# Patient Record
Sex: FEMALE | Race: BLACK OR AFRICAN AMERICAN | Employment: FULL TIME | URBAN - METROPOLITAN AREA
[De-identification: names, ages, dates, MRNs, and addresses within clinical notes are randomized per-mention and may not be internally consistent; named-entity substitution may affect disease eponyms.]

---

## 2017-03-10 ENCOUNTER — OFFICE VISIT (OUTPATIENT)
Dept: FAMILY MEDICINE CLINIC | Age: 32
End: 2017-03-10

## 2017-03-10 VITALS
WEIGHT: 228.2 LBS | SYSTOLIC BLOOD PRESSURE: 124 MMHG | TEMPERATURE: 97.5 F | OXYGEN SATURATION: 98 % | HEART RATE: 98 BPM | DIASTOLIC BLOOD PRESSURE: 87 MMHG | RESPIRATION RATE: 16 BRPM | BODY MASS INDEX: 40.43 KG/M2 | HEIGHT: 63 IN

## 2017-03-10 DIAGNOSIS — Z83.3 FAMILY HISTORY OF DIABETES MELLITUS: Primary | ICD-10-CM

## 2017-03-10 DIAGNOSIS — K52.9 CHRONIC DIARRHEA OF UNKNOWN ORIGIN: ICD-10-CM

## 2017-03-10 DIAGNOSIS — Z79.899 ENCOUNTER FOR LONG-TERM (CURRENT) USE OF MEDICATIONS: ICD-10-CM

## 2017-03-10 DIAGNOSIS — K21.9 GASTROESOPHAGEAL REFLUX DISEASE WITHOUT ESOPHAGITIS: ICD-10-CM

## 2017-03-10 DIAGNOSIS — F17.200 SMOKER: ICD-10-CM

## 2017-03-10 DIAGNOSIS — K90.49 DAIRY PRODUCT INTOLERANCE: ICD-10-CM

## 2017-03-10 DIAGNOSIS — M54.31 SCIATICA, RIGHT SIDE: ICD-10-CM

## 2017-03-10 LAB
HCG URINE, QL. (POC): NEGATIVE
VALID INTERNAL CONTROL?: YES

## 2017-03-10 RX ORDER — OMEPRAZOLE 20 MG/1
20 CAPSULE, DELAYED RELEASE ORAL DAILY
Qty: 30 CAP | Refills: 2 | Status: SHIPPED | OUTPATIENT
Start: 2017-03-10 | End: 2022-09-09

## 2017-03-10 NOTE — PATIENT INSTRUCTIONS
Gastroesophageal Reflux Disease (GERD): Care Instructions  Your Care Instructions    Gastroesophageal reflux disease (GERD) is the backward flow of stomach acid into the esophagus. The esophagus is the tube that leads from your throat to your stomach. A one-way valve prevents the stomach acid from moving up into this tube. When you have GERD, this valve does not close tightly enough. If you have mild GERD symptoms including heartburn, you may be able to control the problem with antacids or over-the-counter medicine. Changing your diet, losing weight, and making other lifestyle changes can also help reduce symptoms. Follow-up care is a key part of your treatment and safety. Be sure to make and go to all appointments, and call your doctor if you are having problems. Its also a good idea to know your test results and keep a list of the medicines you take. How can you care for yourself at home? · Take your medicines exactly as prescribed. Call your doctor if you think you are having a problem with your medicine. · Your doctor may recommend over-the-counter medicine. For mild or occasional indigestion, antacids, such as Tums, Gaviscon, Mylanta, or Maalox, may help. Your doctor also may recommend over-the-counter acid reducers, such as Pepcid AC, Tagamet HB, Zantac 75, or Prilosec. Read and follow all instructions on the label. If you use these medicines often, talk with your doctor. · Change your eating habits. ¨ Its best to eat several small meals instead of two or three large meals. ¨ After you eat, wait 2 to 3 hours before you lie down. ¨ Chocolate, mint, and alcohol can make GERD worse. ¨ Spicy foods, foods that have a lot of acid (like tomatoes and oranges), and coffee can make GERD symptoms worse in some people. If your symptoms are worse after you eat a certain food, you may want to stop eating that food to see if your symptoms get better.   · Do not smoke or chew tobacco. Smoking can make GERD worse. If you need help quitting, talk to your doctor about stop-smoking programs and medicines. These can increase your chances of quitting for good. · If you have GERD symptoms at night, raise the head of your bed 6 to 8 inches by putting the frame on blocks or placing a foam wedge under the head of your mattress. (Adding extra pillows does not work.)  · Do not wear tight clothing around your middle. · Lose weight if you need to. Losing just 5 to 10 pounds can help. When should you call for help? Call your doctor now or seek immediate medical care if:  · You have new or different belly pain. · Your stools are black and tarlike or have streaks of blood. Watch closely for changes in your health, and be sure to contact your doctor if:  · Your symptoms have not improved after 2 days. · Food seems to catch in your throat or chest.  Where can you learn more? Go to http://joesphAkeneoleia.info/. Enter W854 in the search box to learn more about \"Gastroesophageal Reflux Disease (GERD): Care Instructions. \"  Current as of: August 9, 2016  Content Version: 11.1  © 9059-6154 [employer]. Care instructions adapted under license by [employer] (which disclaims liability or warranty for this information). If you have questions about a medical condition or this instruction, always ask your healthcare professional. [address] any warranty or liability for your use of this information. Lactose Intolerance: Care Instructions  Your Care Instructions  Lactose is sugar that is found in milk and milk products. Some people do not make enough of an enzyme called lactase, which digests lactose. When this happens it can cause gas, belly pain, diarrhea, and bloating. This is called lactose intolerance. This is not the same as food allergy to milk. Lactose intolerance affects different people in different ways. Some people cannot digest any milk products.  Other people can eat or drink small amounts of milk products or certain types of milk products without problems. You can learn how to avoid discomfort and still get enough calcium to maintain healthy bones. Follow-up care is a key part of your treatment and safety. Be sure to make and go to all appointments, and call your doctor if you are having problems. It's also a good idea to know your test results and keep a list of the medicines you take. How can you care for yourself at home? · Limit the amount of milk and milk products in your diet. Try to drink 1 glass of milk each day. Drink small amounts several times a day. All types of milk contain the same amount of lactose. If you are not sure whether a milk product causes symptoms, try a small amount and wait to see how you feel before you eat or drink more. · Eat or drink milk and milk products along with other foods. For some people, combining a solid food (like cereal) with a dairy product (like milk) can reduce symptoms. · Eat small amounts of milk products throughout the day instead of larger amounts all at once. · Eat or drink milk and milk products that have reduced lactose. In most grocery stores, you can buy milk with reduced lactose, such as Lactaid milk. · Eat or drink other foods instead of milk and milk products. Try soy milk and soy cheese, and use nondairy creamers in your coffee. Keep in mind that nondairy creamers may contain more fat than milk. · Use lactase products. These are dietary supplements that help you digest lactose. Some are pills that you chew (such as Lactaid) before you eat or drink milk products. Others are liquids that you add to milk 24 hours before you drink it. Try a few products and brands to see which ones work best for you. · Some people who are lactose-intolerant can eat some kinds of yogurt without problems, especially yogurt with live cultures.  It's best to try a small amount of different brands of yogurt to see which ones work best for you. · Watch out for lactose in foods you buy. Some prepared foods contain lactose, including breads and baked goods, breakfast cereals, instant potatoes and soups, margarine, salad dressings, and many snacks. Be sure to read labels for lactose and for lactose's \"hidden\" names. These include dry milk solids, whey, curds, milk by-products, and nonfat dry milk powder. · Be sure to get enough calcium in your diet, especially if you avoid milk products completely. To get enough calcium, you would need to eat calcium-rich foods as often as someone would drink milk. Calcium is very important because it keeps bones strong and reduces the risk of osteoporosis. Ask your dietitian for advice on how to get enough calcium. Foods that have calcium include:  ¨ Broccoli, spinach, kale, and jovanna, mustard, and turnip greens. ¨ Canned sardines and other small fish that have bones you can eat. ¨ Calcium-fortified orange juice. ¨ Soy products such as fortified soy milk and tofu. ¨ Almonds. ¨ Dried beans. · If you are worried about getting enough nutrients, ask your doctor about taking supplements, such as calcium and vitamin D. When should you call for help? Call your doctor now or seek immediate medical care if:  · You have severe belly pain. · You pass maroon or very bloody stools. · Your stools are black and tarlike or have streaks of blood. · You have trouble swallowing. · You are losing weight and do not know why. Watch closely for changes in your health, and be sure to contact your doctor if:  · Your symptoms get worse. · You do not get better as expected. Where can you learn more? Go to http://joesph-leia.info/. Enter H201 in the search box to learn more about \"Lactose Intolerance: Care Instructions. \"  Current as of: February 12, 2016  Content Version: 11.1  © 1205-0565 [employer].  Care instructions adapted under license by [employer] (which disclaims liability or warranty for this information). If you have questions about a medical condition or this instruction, always ask your healthcare professional. [address] any warranty or liability for your use of this information. Celiac Disease: Care Instructions  Your Care Instructions  Celiac disease (or celiac sprue) is a problem with digesting gluten. Gluten is a type of protein found in wheat, rye, and other grains. This problem starts when the body's immune system attacks the small intestine when gluten is eaten. The immune system is supposed to fight off viruses and other invaders, but sometimes it turns on the person's own body. (This is called an autoimmune disease.) Celiac disease seems to run in families. Celiac disease causes damage to the small intestine. This makes it hard for the body to absorb vitamins and other nutrients. You cannot prevent celiac disease. But you can stop and reverse the damage to the small intestine by eating a strict gluten-free diet. Follow-up care is a key part of your treatment and safety. Be sure to make and go to all appointments, and call your doctor if you are having problems. It's also a good idea to know your test results and keep a list of the medicines you take. How can you care for yourself at home? · Eat a gluten-free diet to prevent symptoms and damage to the small intestine. Even a small amount of gluten may cause damage. ¨ Avoid all foods that contain wheat, rye, and barley gluten. Bread, bagels, pasta, pizza, malted breakfast cereals, and crackers are all examples of foods that contain gluten. ¨ Avoid oats, at least at first. Oats cause symptoms in some people. After your symptoms stop, you may be able to add oats to your diet. · You may need to avoid milk and milk products for a while. Once you stop eating any gluten, the intestine will begin to heal. Then it should be okay to drink milk and eat milk products.   · Read food labels carefully and look for hidden gluten, such as gluten in medicine and some food additives. If a label says \"modified food starch,\" the product may contain gluten. · Plan your diet around:  ¨ Eggs. ¨ Dairy products, if you can eat them. Cheese, yogurt, and other dairy products can be an important part of the diet. ¨ Flours and foods made with amaranth, arrowroot, beans, buckwheat, corn, cornmeal, flax, millet, potatoes, pure uncontaminated nut and oat bran, quinoa, rice, sorghum, soybeans, tapioca, or teff. ¨ Fresh, frozen, and canned meats, fruits, and vegetables. Watch for added gluten. · Talk to your doctor or contact your local hospital or dietitian for information about support groups in your area. You may find a support group helpful for discovering ways to help you deal with celiac disease. Celiac disease support groups often share recipes and good food sources. · Look for gluten-free foods. Many food stores, especially health food stores, offer specially marked gluten-free food. When should you call for help? Watch closely for changes in your health, and be sure to contact your doctor if:  · Your bloating, gas, and diarrhea get worse. · You have bloating, gas, and diarrhea after not having them for a while. Where can you learn more? Go to http://joesph-leia.info/. Enter [id number] in the search box to learn more about \"Celiac Disease: Care Instructions. \"  Current as of: August 9, 2016  Content Version: 11.1  © 8822-2210 [employer]. Care instructions adapted under license by [employer] (which disclaims liability or warranty for this information). If you have questions about a medical condition or this instruction, always ask your healthcare professional. [address] any warranty or liability for your use of this information.        Gluten-Free Diet: Care Instructions  Your Care Instructions  To help your symptoms, your doctor has recommended a gluten-free diet. This means not eating foods that have gluten in them. Gluten is a kind of protein. It's found in wheat, barley, and rye. If you eat a gluten-free diet, you can help manage your symptoms and prevent long-term problems. You can also get all the nutrition you need. Follow-up care is a key part of your treatment and safety. Be sure to make and go to all appointments, and call your doctor if you are having problems. It's also a good idea to know your test results and keep a list of the medicines you take. How can you care for yourself at home? · Don't eat any foods that have gluten in them. These include bagels, bread, crackers, and some cereals. They also include pasta and pizza. · Carefully read food labels. Look for wheat or wheat products in ice cream and candy. You may also find them in salad dressing, canned and frozen soups and vegetables, and other processed foods. · Avoid all beer products unless the label says they are gluten-free. Beers with and without alcohol have gluten unless the labels say they are gluten-free. This includes lagers, ales, and stouts. · When you eat out, look for restaurants that serve gluten-free food. You can also ask if the  is familiar with gluten-free cooking. · Try to learn more about gluten-free options. Find grocery stores that sell gluten-free pizza and other foods. If you have access to the Internet, look online for gluten-free foods and recipes. · On a gluten-free eating plan, it's okay to have:  ¨ Eggs and dairy products. (But some dairy products may make your symptoms worse. Ask your doctor if you have questions about dairy products. Read ingredient labels carefully. Some processed cheeses contain gluten.)  ¨ Flours and foods made with amaranth, arrowroot, beans, buckwheat, corn, cornmeal, flax, millet, potatoes, pure uncontaminated nut and oat bran, quinoa, rice, sorghum, soybeans, tapioca, or teff.   ¨ Fresh, frozen, or canned unprocessed meats. But avoid processed meats. Some examples of processed meats to avoid are hot dogs, salami, and deli meat. Read labels for additives that may contain gluten. ¨ Fresh, frozen, dried, or canned fruits and vegetables, if they do not have thickeners or other additives that contain gluten. ¨ Some alcohol drinks. These include wine, liqueurs, and ciders. They also include liquor like whiskey and valentine. When should you call for help? Watch closely for changes in your health, and be sure to contact your doctor if:  · You have unexplained weight loss. · You have diarrhea that lasts longer than 1 to 2 weeks. · You have unusual fatigue or mood changes, especially if these last more than a week and are not related to any other illness, such as the flu. · Your symptoms come back again. · Your stomach pain gets worse. Where can you learn more? Go to http://joesph-leia.info/. Enter [id number] in the search box to learn more about \"Gluten-Free Diet: Care Instructions. \"  Current as of: July 26, 2016  Content Version: 11.1  © 2086-4260 [employer]. Care instructions adapted under license by [employer] (which disclaims liability or warranty for this information). If you have questions about a medical condition or this instruction, always ask your healthcare professional. [place] [address] any warranty or liability for your use of this information.

## 2017-03-10 NOTE — PROGRESS NOTES
Tish Schneider is a 32 y.o. female    3rd visit with this physician. Saw once in 2015 for URI, again once in 2016 Feb for URI. GI: Whenever eat Bread; cold, chill, vomiting, stomach pain, diarrhea. No blood in stool or constipation. No fever. Currently no issue. Sometimes dairy will do the same thing. Bloating gassy, diarrhea. Stomach constantly in knots. Also have acid reflux. Taking zantac and pepto bismal PRN. Chronic low back pain with sciatica. No new symptoms of LE weakness, loss of bowel or bladder, or saddle anesthesia. Review of Systems  A comprehensive review of systems was negative except for that written in the HPI. Current Outpatient Prescriptions on File Prior to Visit   Medication Sig Dispense Refill    naproxen (NAPROSYN) 500 mg tablet Take 1 Tab by mouth every twelve (12) hours as needed for Pain. 20 Tab 0    loratadine (CLARITIN) 10 mg tablet Take 1 Tab by mouth daily. 30 Tab 2     No current facility-administered medications on file prior to visit. Allergies   Allergen Reactions    Chantix [Varenicline] Nausea Only       Past Medical History:   Diagnosis Date    Asthma     No problems as adult or teenager--last problems as child    Chronic diarrhea of unknown origin 3/10/2017    Dairy product intolerance 3/10/2017    Family history of diabetes mellitus 3/10/2017    Gastroesophageal reflux disease without esophagitis 3/10/2017    History of seasonal allergies     Allegra or Claritin--equally effective.     Sciatica, right side 3/10/2017    Smoker 2/23/2016    Smoker 2/23/2016       Family History   Problem Relation Age of Onset    Diabetes Mother     No Known Problems Father     Hypertension Maternal Aunt     Heart Disease Maternal Grandmother      CAD    Diabetes Maternal Grandmother     Hypertension Maternal Grandmother     No Known Problems Sister        Social History     Social History    Marital status: SINGLE     Spouse name: N/A    Number of children: 0    Years of education: college     Occupational History    [employer]      Social History Main Topics    Smoking status: Current Every Day Smoker     Packs/day: 0.50     Years: 20.00     Types: Cigarettes    Smokeless tobacco: Never Used    Alcohol use Yes      Comment: 3 pints of burbon weekly    Drug use: No    Sexual activity: Yes     Partners: Male     Birth control/ protection: None     Other Topics Concern    Not on file     Social History Narrative    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 PERRL, EOM's intact  Lungs: clear to auscultation bilaterally  Heart: regular rate and rhythm, S1, S2 normal, no murmur, click, rub or gallop  Extremities: extremities normal, atraumatic, no cyanosis or edema  Abdo: soft, slight tender on deep palpation. No guarding or rebound. Bowel sounds normal. No masses,  no organomegaly    Urine pregnancy negative. Assessments and Plans    Antonina was seen today for irritable bowel syndrome. Diagnoses and all orders for this visit:    Family history of diabetes mellitus    Smoker    Gastroesophageal reflux disease without esophagitis  -     omeprazole (PRILOSEC) 20 mg capsule; Take 1 Cap by mouth daily.     Sciatica, right side  -     REFERRAL TO PHYSICAL THERAPY    Dairy product intolerance  -     LACTOSE TOLERANCE TEST    Chronic diarrhea of unknown origin  - CBC W/O DIFF  -     METABOLIC PANEL, COMPREHENSIVE  -     TSH RFX ON ABNORMAL TO FREE T4  -     CELIAC ANTIBODY PROFILE    Encounter for long-term (current) use of medications  -     AMB POC URINE PREGNANCY TEST, VISUAL COLOR COMPARISON      Follow-up Disposition:  Return in about 2 weeks (around 3/24/2017) for annual exam, labs.       Larry Ross MD  3/10/2017

## 2017-03-10 NOTE — MR AVS SNAPSHOT
Visit Information Date & Time Provider Department Dept. Phone Encounter #  
 3/10/2017 11:45 AM Cammy Raymundo MD [facility] at [address] [id number] Follow-up Instructions Return in about 2 weeks (around 3/24/2017) for annual exam, labs. Upcoming Health Maintenance Date Due Pneumococcal 19-64 Medium Risk (1 of 1 - PPSV23) 7/14/2004 DTaP/Tdap/Td series (1 - Tdap) 7/14/2006 PAP AKA CERVICAL CYTOLOGY 7/14/2006 INFLUENZA AGE 9 TO ADULT 8/1/2016 Allergies as of 3/10/2017  Review Complete On: 3/10/2017 By: Cammy Raymundo MD  
  
 Severity Noted Reaction Type Reactions Chantix [Varenicline]  10/10/2011    Nausea Only Current Immunizations  Reviewed on 6/12/2014 Name Date Hepatitis B Vaccine 7/5/2003 PPD 7/5/2003 Not reviewed this visit You Were Diagnosed With   
  
 Codes Comments Family history of diabetes mellitus    -  Primary ICD-10-CM: Z83.3 ICD-9-CM: V18.0 Smoker     ICD-10-CM: G53.367 ICD-9-CM: 305.1 Gastroesophageal reflux disease without esophagitis     ICD-10-CM: K21.9 ICD-9-CM: 530.81 Sciatica, right side     ICD-10-CM: M54.31 
ICD-9-CM: 724.3 Dairy product intolerance     ICD-10-CM: K90.9 ICD-9-CM: 579.8 Chronic diarrhea of unknown origin     ICD-10-CM: K52.9 ICD-9-CM: 787.91 Encounter for long-term (current) use of medications     ICD-10-CM: Z79.899 ICD-9-CM: V58.69 Vitals BP Pulse Temp Resp Height(growth percentile) Weight(growth percentile) 124/87 98 97.5 °F (36.4 °C) (Oral) 16 5' 3\" (1.6 m) 228 lb 3.2 oz (103.5 kg) LMP SpO2 BMI OB Status Smoking Status 02/07/2017 98% 40.42 kg/m2 Having regular periods Current Every Day Smoker Vitals History BMI and BSA Data Body Mass Index Body Surface Area 40.42 kg/m 2 2.14 m 2 Preferred Pharmacy Pharmacy Name Phone  [address] [phone] Your Updated Medication List  
  
   
This list is accurate as of: 3/10/17 12:25 PM.  Always use your most recent med list.  
  
  
  
  
 loratadine 10 mg tablet Commonly known as:  Yari Dustman Take 1 Tab by mouth daily. naproxen 500 mg tablet Commonly known as:  NAPROSYN Take 1 Tab by mouth every twelve (12) hours as needed for Pain. omeprazole 20 mg capsule Commonly known as:  PRILOSEC Take 1 Cap by mouth daily. Prescriptions Sent to Pharmacy Refills  
 omeprazole (PRILOSEC) 20 mg capsule 2 Sig: Take 1 Cap by mouth daily. Class: Normal  
 Pharmacy: [facility] [employer] [facility] [address] [place] [address] RD AT [address] [facility] Ph #: [phone] Route: Oral  
  
We Performed the Following AMB POC URINE PREGNANCY TEST, VISUAL COLOR COMPARISON [15211 CPT(R)] CBC W/O DIFF [39245 CPT(R)] CELIAC ANTIBODY PROFILE [[id number] Custom] LACTOSE TOLERANCE TEST [[id number] Custom] METABOLIC PANEL, COMPREHENSIVE [58560 CPT(R)] REFERRAL TO PHYSICAL THERAPY [CNZ51 Custom] Comments:  
 Please evaluate patient for chronic back pain with sciatica. TSH RFX ON ABNORMAL TO FREE T4 [[id number] Custom] Follow-up Instructions Return in about 2 weeks (around 3/24/2017) for annual exam, labs. Referral Information Referral ID Referred By Referred To  
  
 [id number] Dalila Ma Not Available Visits Status Start Date End Date 1 New Request 3/10/17 3/10/18 If your referral has a status of pending review or denied, additional information will be sent to support the outcome of this decision. Patient Instructions Gastroesophageal Reflux Disease (GERD): Care Instructions Your Care Instructions Gastroesophageal reflux disease (GERD) is the backward flow of stomach acid into the esophagus.  The esophagus is the tube that leads from your throat to your stomach. A one-way valve prevents the stomach acid from moving up into this tube. When you have GERD, this valve does not close tightly enough. If you have mild GERD symptoms including heartburn, you may be able to control the problem with antacids or over-the-counter medicine. Changing your diet, losing weight, and making other lifestyle changes can also help reduce symptoms. Follow-up care is a key part of your treatment and safety. Be sure to make and go to all appointments, and call your doctor if you are having problems. Its also a good idea to know your test results and keep a list of the medicines you take. How can you care for yourself at home? · Take your medicines exactly as prescribed. Call your doctor if you think you are having a problem with your medicine. · Your doctor may recommend over-the-counter medicine. For mild or occasional indigestion, antacids, such as Tums, Gaviscon, Mylanta, or Maalox, may help. Your doctor also may recommend over-the-counter acid reducers, such as Pepcid AC, Tagamet HB, Zantac 75, or Prilosec. Read and follow all instructions on the label. If you use these medicines often, talk with your doctor. · Change your eating habits. ¨ Its best to eat several small meals instead of two or three large meals. ¨ After you eat, wait 2 to 3 hours before you lie down. ¨ Chocolate, mint, and alcohol can make GERD worse. ¨ Spicy foods, foods that have a lot of acid (like tomatoes and oranges), and coffee can make GERD symptoms worse in some people. If your symptoms are worse after you eat a certain food, you may want to stop eating that food to see if your symptoms get better. · Do not smoke or chew tobacco. Smoking can make GERD worse. If you need help quitting, talk to your doctor about stop-smoking programs and medicines. These can increase your chances of quitting for good.  
· If you have GERD symptoms at night, raise the head of your bed 6 to 8 inches by putting the frame on blocks or placing a foam wedge under the head of your mattress. (Adding extra pillows does not work.) · Do not wear tight clothing around your middle. · Lose weight if you need to. Losing just 5 to 10 pounds can help. When should you call for help? Call your doctor now or seek immediate medical care if: 
· You have new or different belly pain. · Your stools are black and tarlike or have streaks of blood. Watch closely for changes in your health, and be sure to contact your doctor if: 
· Your symptoms have not improved after 2 days. · Food seems to catch in your throat or chest. 
Where can you learn more? Go to http://joesph-leia.info/. Enter Q035 in the search box to learn more about \"Gastroesophageal Reflux Disease (GERD): Care Instructions. \" Current as of: August 9, 2016 Content Version: 11.1 © 2365-9580 Urbita. Care instructions adapted under license by [employer]ito (which disclaims liability or warranty for this information). If you have questions about a medical condition or this instruction, always ask your healthcare professional. [place] [address] any warranty or liability for your use of this information. Lactose Intolerance: Care Instructions Your Care Instructions Lactose is sugar that is found in milk and milk products. Some people do not make enough of an enzyme called lactase, which digests lactose. When this happens it can cause gas, belly pain, diarrhea, and bloating. This is called lactose intolerance. This is not the same as food allergy to milk. Lactose intolerance affects different people in different ways. Some people cannot digest any milk products. Other people can eat or drink small amounts of milk products or certain types of milk products without problems. You can learn how to avoid discomfort and still get enough calcium to maintain healthy bones. Follow-up care is a key part of your treatment and safety. Be sure to make and go to all appointments, and call your doctor if you are having problems. It's also a good idea to know your test results and keep a list of the medicines you take. How can you care for yourself at home? · Limit the amount of milk and milk products in your diet. Try to drink 1 glass of milk each day. Drink small amounts several times a day. All types of milk contain the same amount of lactose. If you are not sure whether a milk product causes symptoms, try a small amount and wait to see how you feel before you eat or drink more. · Eat or drink milk and milk products along with other foods. For some people, combining a solid food (like cereal) with a dairy product (like milk) can reduce symptoms. · Eat small amounts of milk products throughout the day instead of larger amounts all at once. · Eat or drink milk and milk products that have reduced lactose. In most grocery stores, you can buy milk with reduced lactose, such as Lactaid milk. · Eat or drink other foods instead of milk and milk products. Try soy milk and soy cheese, and use nondairy creamers in your coffee. Keep in mind that nondairy creamers may contain more fat than milk. · Use lactase products. These are dietary supplements that help you digest lactose. Some are pills that you chew (such as Lactaid) before you eat or drink milk products. Others are liquids that you add to milk 24 hours before you drink it. Try a few products and brands to see which ones work best for you. · Some people who are lactose-intolerant can eat some kinds of yogurt without problems, especially yogurt with live cultures. It's best to try a small amount of different brands of yogurt to see which ones work best for you. · Watch out for lactose in foods you buy.  Some prepared foods contain lactose, including breads and baked goods, breakfast cereals, instant potatoes and soups, margarine, salad dressings, and many snacks. Be sure to read labels for lactose and for lactose's \"hidden\" names. These include dry milk solids, whey, curds, milk by-products, and nonfat dry milk powder. · Be sure to get enough calcium in your diet, especially if you avoid milk products completely. To get enough calcium, you would need to eat calcium-rich foods as often as someone would drink milk. Calcium is very important because it keeps bones strong and reduces the risk of osteoporosis. Ask your dietitian for advice on how to get enough calcium. Foods that have calcium include: ¨ Broccoli, spinach, kale, and jovanna, mustard, and turnip greens. ¨ Canned sardines and other small fish that have bones you can eat. ¨ Calcium-fortified orange juice. ¨ Soy products such as fortified soy milk and tofu. ¨ Almonds. ¨ Dried beans. · If you are worried about getting enough nutrients, ask your doctor about taking supplements, such as calcium and vitamin D. When should you call for help? Call your doctor now or seek immediate medical care if: 
· You have severe belly pain. · You pass maroon or very bloody stools. · Your stools are black and tarlike or have streaks of blood. · You have trouble swallowing. · You are losing weight and do not know why. Watch closely for changes in your health, and be sure to contact your doctor if: 
· Your symptoms get worse. · You do not get better as expected. Where can you learn more? Go to http://joesph-leia.info/. Enter H201 in the search box to learn more about \"Lactose Intolerance: Care Instructions. \" Current as of: February 12, 2016 Content Version: 11.1 © 3680-4682 magnify360. Care instructions adapted under license by Caring.[employer] (which disclaims liability or warranty for this information).  If you have questions about a medical condition or this instruction, always ask your healthcare professional. [address] any warranty or liability for your use of this information. Celiac Disease: Care Instructions Your Care Instructions Celiac disease (or celiac sprue) is a problem with digesting gluten. Gluten is a type of protein found in wheat, rye, and other grains. This problem starts when the body's immune system attacks the small intestine when gluten is eaten. The immune system is supposed to fight off viruses and other invaders, but sometimes it turns on the person's own body. (This is called an autoimmune disease.) Celiac disease seems to run in families. Celiac disease causes damage to the small intestine. This makes it hard for the body to absorb vitamins and other nutrients. You cannot prevent celiac disease. But you can stop and reverse the damage to the small intestine by eating a strict gluten-free diet. Follow-up care is a key part of your treatment and safety. Be sure to make and go to all appointments, and call your doctor if you are having problems. It's also a good idea to know your test results and keep a list of the medicines you take. How can you care for yourself at home? · Eat a gluten-free diet to prevent symptoms and damage to the small intestine. Even a small amount of gluten may cause damage. ¨ Avoid all foods that contain wheat, rye, and barley gluten. Bread, bagels, pasta, pizza, malted breakfast cereals, and crackers are all examples of foods that contain gluten. ¨ Avoid oats, at least at first. Oats cause symptoms in some people. After your symptoms stop, you may be able to add oats to your diet. · You may need to avoid milk and milk products for a while. Once you stop eating any gluten, the intestine will begin to heal. Then it should be okay to drink milk and eat milk products.  
· Read food labels carefully and look for hidden gluten, such as gluten in medicine and some food additives. If a label says \"modified food starch,\" the product may contain gluten. · Plan your diet around: 
¨ Eggs. ¨ Dairy products, if you can eat them. Cheese, yogurt, and other dairy products can be an important part of the diet. ¨ Flours and foods made with amaranth, arrowroot, beans, buckwheat, corn, cornmeal, flax, millet, potatoes, pure uncontaminated nut and oat bran, quinoa, rice, sorghum, soybeans, tapioca, or teff. ¨ Fresh, frozen, and canned meats, fruits, and vegetables. Watch for added gluten. · Talk to your doctor or contact your local hospital or dietitian for information about support groups in your area. You may find a support group helpful for discovering ways to help you deal with celiac disease. Celiac disease support groups often share recipes and good food sources. · Look for gluten-free foods. Many food stores, especially health food stores, offer specially marked gluten-free food. When should you call for help? Watch closely for changes in your health, and be sure to contact your doctor if: 
· Your bloating, gas, and diarrhea get worse. · You have bloating, gas, and diarrhea after not having them for a while. Where can you learn more? Go to http://joesph-leia.info/. Enter [id number] in the search box to learn more about \"Celiac Disease: Care Instructions. \" Current as of: August 9, 2016 Content Version: 11.1 © 20063200-9347 Kickfire. Care instructions adapted under license by [employer]er [employer] (which disclaims liability or warranty for this information). If you have questions about a medical condition or this instruction, always ask your healthcare professional. [place] [address] any warranty or liability for your use of this information. Gluten-Free Diet: Care Instructions Your Care Instructions To help your symptoms, your doctor has recommended a gluten-free diet. This means not eating foods that have gluten in them. Gluten is a kind of protein. It's found in wheat, barley, and rye. If you eat a gluten-free diet, you can help manage your symptoms and prevent long-term problems. You can also get all the nutrition you need. Follow-up care is a key part of your treatment and safety. Be sure to make and go to all appointments, and call your doctor if you are having problems. It's also a good idea to know your test results and keep a list of the medicines you take. How can you care for yourself at home? · Don't eat any foods that have gluten in them. These include bagels, bread, crackers, and some cereals. They also include pasta and pizza. · Carefully read food labels. Look for wheat or wheat products in ice cream and candy. You may also find them in salad dressing, canned and frozen soups and vegetables, and other processed foods. · Avoid all beer products unless the label says they are gluten-free. Beers with and without alcohol have gluten unless the labels say they are gluten-free. This includes lagers, ales, and stouts. · When you eat out, look for restaurants that serve gluten-free food. You can also ask if the  is familiar with gluten-free cooking. · Try to learn more about gluten-free options. Find grocery stores that sell gluten-free pizza and other foods. If you have access to the Internet, look online for gluten-free foods and recipes. · On a gluten-free eating plan, it's okay to have: 
¨ Eggs and dairy products. (But some dairy products may make your symptoms worse. Ask your doctor if you have questions about dairy products. Read ingredient labels carefully. Some processed cheeses contain gluten.) ¨ Flours and foods made with amaranth, arrowroot, beans, buckwheat, corn, cornmeal, flax, millet, potatoes, pure uncontaminated nut and oat bran, quinoa, rice, sorghum, soybeans, tapioca, or teff. ¨ Fresh, frozen, or canned unprocessed meats. But avoid processed meats. Some examples of processed meats to avoid are hot dogs, salami, and deli meat. Read labels for additives that may contain gluten. ¨ Fresh, frozen, dried, or canned fruits and vegetables, if they do not have thickeners or other additives that contain gluten. ¨ Some alcohol drinks. These include wine, liqueurs, and ciders. They also include liquor like whiskey and valentine. When should you call for help? Watch closely for changes in your health, and be sure to contact your doctor if: 
· You have unexplained weight loss. · You have diarrhea that lasts longer than 1 to 2 weeks. · You have unusual fatigue or mood changes, especially if these last more than a week and are not related to any other illness, such as the flu. · Your symptoms come back again. · Your stomach pain gets worse. Where can you learn more? Go to http://joesph-leia.info/. Enter 31 [id number] 19 in the search box to learn more about \"Gluten-Free Diet: Care Instructions. \" Current as of: July 26, 2016 Content Version: 11.1 © 2427-6309 Ditto. Care instructions adapted under license by Immure Records (which disclaims liability or warranty for this information). If you have questions about a medical condition or this instruction, always ask your healthcare professional. [address] any warranty or liability for your use of this information. Introducing [facility]! Dear Kerry Shultz: Thank you for requesting a Factor.io account. Our records indicate that you already have an active Factor.io account. You can access your account anytime at [email or web address] Did you know that you can access your hospital and ER discharge instructions at any time in Factor.[email or web address]? You can also review all of your test results from your hospital stay or ER visit. Additional Information If you have questions, please visit the Frequently Asked Questions section of the Tiinkkhart website at [email or web address] Remember, OBOOK is NOT to be used for urgent needs. For medical emergencies, dial 911. Now available from your iPhone and Android! Please provide this summary of care documentation to your next provider. Your primary care clinician is listed as Altagracia Rojas. If you have any questions after today's visit, please call [phone].

## 2017-03-10 NOTE — PROGRESS NOTES
Chief Complaint   Patient presents with    Irritable Bowel Syndrome      heartburn, N/V diahrrea, constipation,

## 2017-05-27 ENCOUNTER — HOSPITAL ENCOUNTER (EMERGENCY)
Age: 32
Discharge: HOME | End: 2017-05-27
Attending: EMERGENCY MEDICINE
Payer: COMMERCIAL

## 2017-05-27 ENCOUNTER — APPOINTMENT (OUTPATIENT)
Dept: GENERAL RADIOLOGY | Age: 32
End: 2017-05-27
Attending: PHYSICIAN ASSISTANT
Payer: COMMERCIAL

## 2017-05-27 VITALS
HEIGHT: 64 IN | TEMPERATURE: 99.2 F | OXYGEN SATURATION: 100 % | WEIGHT: 230.6 LBS | RESPIRATION RATE: 18 BRPM | DIASTOLIC BLOOD PRESSURE: 104 MMHG | BODY MASS INDEX: 39.37 KG/M2 | SYSTOLIC BLOOD PRESSURE: 159 MMHG | HEART RATE: 88 BPM

## 2017-05-27 DIAGNOSIS — F17.210 CIGARETTE NICOTINE DEPENDENCE WITHOUT COMPLICATION: ICD-10-CM

## 2017-05-27 DIAGNOSIS — J06.9 ACUTE UPPER RESPIRATORY INFECTION: Primary | ICD-10-CM

## 2017-05-27 LAB — HCG UR QL: NEGATIVE

## 2017-05-27 PROCEDURE — 71020 XR CHEST PA LAT: CPT

## 2017-05-27 PROCEDURE — 81025 URINE PREGNANCY TEST: CPT

## 2017-05-27 PROCEDURE — 74011250637 HC RX REV CODE- 250/637: Performed by: PHYSICIAN ASSISTANT

## 2017-05-27 PROCEDURE — 99283 EMERGENCY DEPT VISIT LOW MDM: CPT

## 2017-05-27 RX ORDER — NAPROXEN 500 MG/1
500 TABLET ORAL
Qty: 20 TAB | Refills: 0 | Status: SHIPPED | OUTPATIENT
Start: 2017-05-27 | End: 2019-01-30

## 2017-05-27 RX ORDER — HYDROCODONE BITARTRATE AND ACETAMINOPHEN 7.5; 325 MG/15ML; MG/15ML
5 SOLUTION ORAL
Status: COMPLETED | OUTPATIENT
Start: 2017-05-27 | End: 2017-05-27

## 2017-05-27 RX ORDER — IBUPROFEN 200 MG
1 TABLET ORAL EVERY 24 HOURS
Qty: 30 PATCH | Refills: 0 | Status: SHIPPED | OUTPATIENT
Start: 2017-05-27 | End: 2017-06-26

## 2017-05-27 RX ORDER — HYDROCODONE BITARTRATE AND HOMATROPINE METHYLBROMIDE 1.5; 5 MG/5ML; MG/5ML
5 SYRUP ORAL 4 TIMES DAILY
Qty: 100 ML | Refills: 0 | Status: SHIPPED | OUTPATIENT
Start: 2017-05-27 | End: 2019-01-30

## 2017-05-27 RX ORDER — BENZONATATE 100 MG/1
200 CAPSULE ORAL
Qty: 30 CAP | Refills: 0 | Status: SHIPPED | OUTPATIENT
Start: 2017-05-27 | End: 2017-06-03

## 2017-05-27 RX ADMIN — HYDROCODONE BITARTRATE AND ACETAMINOPHEN 5 MG: 2.5; 108 SOLUTION ORAL at 17:18

## 2017-05-27 NOTE — LETTER
[address] 
[facility] [place] [facility] EM[facility] 
[address] [place] [address] [address] [address] [phone] Work/School Note Date: 5/27/2017 To Whom It May concern: 
 
Roberto Johnston was seen and treated today in the emergency room by the following provider(s): 
Attending Provider: Zion Fischer MD 
Physician Assistant: BLAKE Rockwell. Antonina Floyd may return to work on 5/30/17 or sooner, if feeling better. Sincerely, BLAKE Rockwell

## 2017-05-27 NOTE — ED PROVIDER NOTES
HPI Comments: Viki Saini is a 32 y.o. female with PMHx significant for Asthma, who presents ambulatory to [facility] ED with cc of a dry cough for one week. Pt also complains of an associated subjective slight fever. She states that she has taken Nyquil, Vicks, and cough drops with no alleviation in symptoms. She notes that she had asthma as a child, but has not had it recently. She also notes that she is just coming off her LMP. Pt denies having any recent sick contacts or hx of kidney, liver, or thyroid disease. She also denies any ear pain or sore throat. RFA:DILEEP Staley MD    Social Hx: + smoking; - EtOH; - illicit drug use    There are no other complains, changes, or physical findings at this time. The history is provided by the patient. No  was used. Past Medical History:   Diagnosis Date    Asthma     No problems as adult or teenager--last problems as child    Chronic diarrhea of unknown origin 3/10/2017    Dairy product intolerance 3/10/2017    Family history of diabetes mellitus 3/10/2017    Gastroesophageal reflux disease without esophagitis 3/10/2017    History of seasonal allergies     Allegra or Claritin--equally effective.     Sciatica, right side 3/10/2017    Smoker 2/23/2016    Smoker 2/23/2016       Past Surgical History:   Procedure Laterality Date    PAP SMEAR  10/11         Family History:   Problem Relation Age of Onset    Diabetes Mother     No Known Problems Father     Hypertension Maternal Aunt     Heart Disease Maternal Grandmother      CAD    Diabetes Maternal Grandmother     Hypertension Maternal Grandmother     No Known Problems Sister        Social History     Social History    Marital status: SINGLE     Spouse name: N/A    Number of children: 0    Years of education: college     Occupational History    [employer]      Social History Main Topics    Smoking status: Current Every Day Smoker     Packs/day: 0.50     Years: 20.00 Types: Cigarettes    Smokeless tobacco: Never Used    Alcohol use Yes      Comment: 3 pints of burbon weekly    Drug use: No    Sexual activity: Yes     Partners: Male     Birth control/ protection: None     Other Topics Concern    Not on file     Social History Narrative    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 HENT: Negative. Negative for ear pain and sore throat. Eyes: Negative. Respiratory: Positive for cough (dry). Cardiovascular: Negative. Gastrointestinal: Negative. Negative for constipation, diarrhea, nausea and vomiting. Denies liver disease   Endocrine:        Denies thyroid disease   Genitourinary: Negative. Negative for dysuria. Denies kidney disease   Musculoskeletal: Negative. Skin: Negative. Neurological: Negative. All other systems reviewed and are negative. Patient Vitals for the past 12 hrs:   Temp Pulse Resp BP SpO2   05/27/17 1600 99.2 °F (37.3 °C) 88 18 (!) 159/104 100 %       Physical Exam   Constitutional: She is oriented to person, place, and time. She appears well-developed and well-nourished. No distress. HENT:   Head: Normocephalic and atraumatic. Right Ear: External ear normal.   Left Ear: External ear normal.   Nose: Nose normal.   Mouth/Throat: Oropharynx is clear and moist. No oropharyngeal exudate. Eyes: Conjunctivae and EOM are normal. Pupils are equal, round, and reactive to light. Right eye exhibits no discharge. Left eye exhibits no discharge. No scleral icterus. Neck: Normal range of motion. Neck supple. No tracheal deviation present. Cardiovascular: Normal rate, regular rhythm, normal heart sounds and intact distal pulses. Exam reveals no gallop and no friction rub. No murmur heard. Pulmonary/Chest: Effort normal and breath sounds normal. No respiratory distress. She has no wheezes. She has no rales. She exhibits no tenderness. Hacking dry cough. Clear to auscultation bilaterally. Musculoskeletal: She exhibits no edema or tenderness. Lymphadenopathy:     She has no cervical adenopathy. Neurological: She is alert and oriented to person, place, and time. No cranial nerve deficit. Skin: Skin is warm and dry. No rash noted. No erythema. Psychiatric: She has a normal mood and affect. Her behavior is normal.   Nursing note and vitals reviewed. MDM  Number of Diagnoses or Management Options  Acute upper respiratory infection:   Cigarette nicotine dependence without complication:   Diagnosis management comments: DDx: PNA, URI, nicotine dependence       Amount and/or Complexity of Data Reviewed  Clinical lab tests: ordered and reviewed  Tests in the radiology section of CPT®: ordered and reviewed  Review and summarize past medical records: yes    Patient Progress  Patient progress: stable      Procedures    4:54 PM  Discussed the risks of smoking and the benefits of smoking cessation as well as the long term sequelae of smoking with the pt. The patient verbalized their understanding. LABORATORY TESTS:  Recent Results (from the past 12 hour(s))   HCG URINE, QL. - POC    Collection Time: 05/27/17  4:44 PM   Result Value Ref Range    Pregnancy test,urine (POC) NEGATIVE  NEG         IMAGING RESULTS:    CXR Results  (Last 48 hours)               05/27/17 1712  XR CHEST PA LAT Final result    Impression:  IMPRESSION: No acute abnormality identified                       Narrative:  EXAM:  XR CHEST PA LAT       INDICATION:   cough       COMPARISON: None. FINDINGS: PA and lateral radiographs of the chest demonstrate clear lungs. The   cardiac and mediastinal contours and pulmonary vascularity are normal.  The   bones and soft tissues are within normal limits. MEDICATIONS GIVEN:  Medications   HYDROcodone-acetaminophen (HYCET) 0.5-21.7 mg/mL oral solution 5 mg (5 mg Oral Given 5/27/17 5638)       IMPRESSION:  1. Acute upper respiratory infection    2. Cigarette nicotine dependence without complication        PLAN:  1. Discharge Medication List as of 5/27/2017  5:55 PM      START taking these medications    Details   nicotine (NICODERM CQ) 14 mg/24 hr patch 1 Patch by TransDERmal route every twenty-four (24) hours for 30 days. , Normal, Disp-30 Patch, R-0      HYDROcodone-homatropine (HYCODAN) 5-1.5 mg/5 mL (5 mL) syrup Take 5 mL by mouth four (4) times daily. Max Daily Amount: 20 mL., Print, Disp-100 mL, R-0      benzonatate (TESSALON PERLES) 100 mg capsule Take 2 Caps by mouth three (3) times daily as needed for Cough for up to 7 days. , Normal, Disp-30 Cap, R-0         CONTINUE these medications which have CHANGED    Details   naproxen (NAPROSYN) 500 mg tablet Take 1 Tab by mouth every twelve (12) hours as needed for Pain., Normal, Disp-20 Tab, R-0         CONTINUE these medications which have NOT CHANGED    Details   omeprazole (PRILOSEC) 20 mg capsule Take 1 Cap by mouth daily. , Normal, Disp-30 Cap, R-2      loratadine (CLARITIN) 10 mg tablet Take 1 Tab by mouth daily. , Normal, Disp-30 Tab, R-2           2. Follow-up Information     Follow up With Details Comments Contact Info    Marylen Kirsten, MD   [address] [place] [address]  [phone]      [facility] EMERGENCY DEPT  If symptoms worsen [address] [place] [address]  [phone]        Return to ED if worse     DISCHARGE NOTE  5:56 PM  The patient has been re-evaluated and is ready for discharge. Reviewed available results with patient. Counseled patient on diagnosis and care plan. Patient has expressed understanding, and all questions have been answered. Patient agrees with plan and agrees to follow up as recommended, or return to the ED if their symptoms worsen. Discharge instructions have been provided and explained to the patient, along with reasons to return to the ED.     ATTESTATION:  This note is prepared by Radha Katz, acting as Scribe for Sharona Hatch MD.    Sharona Hatch MD: The scribe's documentation has been prepared under my direction and personally reviewed by me in its entirety. I confirm that the note above accurately reflects all work, treatment, procedures, and medical decision making performed by me.

## 2017-05-27 NOTE — ED NOTES
Discharge instructions reviewed with pt. Discharge instructions given to pt per PA. Pt able to return/verbalize discharge instructions. Copy of discharge instructions given. RX given to pt. Pt condition stable, respiratory status within normal limits, neuro status intact. Pt ambulatory out of ER, accompanied by .

## 2017-05-27 NOTE — DISCHARGE INSTRUCTIONS
Saline Nasal Washes: Care Instructions  Your Care Instructions  Saline nasal washes help keep the nasal passages open by washing out thick or dried mucus. This simple remedy can help relieve symptoms of allergies, sinusitis, and colds. It also can make the nose feel more comfortable by keeping the mucous membranes moist. You may notice a little burning sensation in your nose the first few times you use the solution, but this usually gets better in a few days. Follow-up care is a key part of your treatment and safety. Be sure to make and go to all appointments, and call your doctor if you are having problems. It's also a good idea to know your test results and keep a list of the medicines you take. How can you care for yourself at home? · You can buy premixed saline solution in a squeeze bottle or other sinus rinse products at a drugstore. Read and follow the instructions on the label. · You also can make your own saline solution by adding 1 teaspoon of salt and 1 teaspoon of baking soda to 2 cups of distilled water. · If you use a homemade solution, pour a small amount into a clean bowl. Using a rubber bulb syringe, squeeze the syringe and place the tip in the salt water. Pull a small amount of the salt water into the syringe by relaxing your hand. · Sit down with your head tilted slightly back. Do not lie down. Put the tip of the bulb syringe or the squeeze bottle a little way into one of your nostrils. Gently drip or squirt a few drops into the nostril. Repeat with the other nostril. Some sneezing and gagging are normal at first.  · Gently blow your nose. · Wipe the syringe or bottle tip clean after each use. · Repeat this 2 or 3 times a day. · Use nasal washes gently if you have nosebleeds often. When should you call for help? Watch closely for changes in your health, and be sure to contact your doctor if:  · You often get nosebleeds. · You have problems doing the nasal washes.   Where can you learn more? Go to http://joesph-leia.info/. Enter 071 [id number] [id number] in the search box to learn more about \"Saline Nasal Washes: Care Instructions. \"  Current as of: July 29, 2016  Content Version: 11.2  © 7756-6183 [employer]. Care instructions adapted under license by [employer] (which disclaims liability or warranty for this information). If you have questions about a medical condition or this instruction, always ask your healthcare professional. [address] any warranty or liability for your use of this information. Learning About Benefits From Quitting Smoking  How does quitting smoking make you healthier? If you're thinking about quitting smoking, you may have a few reasons to be smoke-free. Your health may be one of them. · When you quit smoking, you lower your risks for cancer, lung disease, heart attack, stroke, blood vessel disease, and blindness from macular degeneration. · When you're smoke-free, you get sick less often, and you heal faster. You are less likely to get colds, flu, bronchitis, and pneumonia. · As a nonsmoker, you may find that your mood is better and you are less stressed. When and how will you feel healthier? Quitting has real health benefits that start from day 1 of being smoke-free. And the longer you stay smoke-free, the healthier you get and the better you feel. The first hours  · After just 20 minutes, your blood pressure and heart rate go down. That means there's less stress on your heart and blood vessels. · Within 12 hours, the level of carbon monoxide in your blood drops back to normal. That makes room for more oxygen. With more oxygen in your body, you may notice that you have more energy than when you smoked. After 2 weeks  · Your lungs start to work better. · Your risk of heart attack starts to drop.   After 1 month  · When your lungs are clear, you cough less and breathe deeper, so it's easier to be active. · Your sense of taste and smell return. That means you can enjoy food more than you have since you started smoking. Over the years  · After 1 year, your risk of heart disease is half what it would be if you kept smoking. · After 5 years, your risk of stroke starts to shrink. Within a few years after that, it's about the same as if you'd never smoked. · After 10 years, your risk of dying from lung cancer is cut by about half. And your risk for many other types of cancer is lower too. How would quitting help others in your life? When you quit smoking, you improve the health of everyone who now breathes in your smoke. · Their heart, lung, and cancer risks drop, much like yours. · They are sick less. For babies and small children, living smoke-free means they're less likely to have ear infections, pneumonia, and bronchitis. · If you're a woman who is or will be pregnant someday, quitting smoking means a healthier . · Children who are close to you are less likely to become adult smokers. Where can you learn more? Go to http://joesphSecure-NOKleia.info/. Enter [id number] [id number] in the search box to learn more about \"Learning About Benefits From Quitting Smoking. \"  Current as of: May 26, 2016  Content Version: 11.2  © 3193-3644 [employer]. Care instructions adapted under license by [employer] (which disclaims liability or warranty for this information). If you have questions about a medical condition or this instruction, always ask your healthcare professional. [place] [address] any warranty or liability for your use of this information. Stopping Smoking: Care Instructions  Your Care Instructions  Cigarette smokers crave the nicotine in cigarettes. Giving it up is much harder than simply changing a habit. Your body has to stop craving the nicotine. It is hard to quit, but you can do it. There are many tools that people use to quit smoking.  You may find that combining tools works best for you. There are several steps to quitting. First you get ready to quit. Then you get support to help you. After that, you learn new skills and behaviors to become a nonsmoker. For many people, a necessary step is getting and using medicine. Your doctor will help you set up the plan that best meets your needs. You may want to attend a smoking cessation program to help you quit smoking. When you choose a program, look for one that has proven success. Ask your doctor for ideas. You will greatly increase your chances of success if you take medicine as well as get counseling or join a cessation program.  Some of the changes you feel when you first quit tobacco are uncomfortable. Your body will miss the nicotine at first, and you may feel short-tempered and grumpy. You may have trouble sleeping or concentrating. Medicine can help you deal with these symptoms. You may struggle with changing your smoking habits and rituals. The last step is the tricky one: Be prepared for the smoking urge to continue for a time. This is a lot to deal with, but keep at it. You will feel better. Follow-up care is a key part of your treatment and safety. Be sure to make and go to all appointments, and call your doctor if you are having problems. Its also a good idea to know your test results and keep a list of the medicines you take. How can you care for yourself at home? · Ask your family, friends, and coworkers for support. You have a better chance of quitting if you have help and support. · Join a support group, such as Nicotine Anonymous, for people who are trying to quit smoking. · Consider signing up for a smoking cessation program, such as the American Lung Association's Freedom from Smoking program.  · Set a quit date. Pick your date carefully so that it is not right in the middle of a big deadline or stressful time. Once you quit, do not even take a puff.  Get rid of all ashtrays and lighters after your last cigarette. Clean your house and your clothes so that they do not smell of smoke. · Learn how to be a nonsmoker. Think about ways you can avoid those things that make you reach for a cigarette. ¨ Avoid situations that put you at greatest risk for smoking. For some people, it is hard to have a drink with friends without smoking. For others, they might skip a coffee break with coworkers who smoke. ¨ Change your daily routine. Take a different route to work or eat a meal in a different place. · Cut down on stress. Calm yourself or release tension by doing an activity you enjoy, such as reading a book, taking a hot bath, or gardening. · Talk to your doctor or pharmacist about nicotine replacement therapy, which replaces the nicotine in your body. You still get nicotine but you do not use tobacco. Nicotine replacement products help you slowly reduce the amount of nicotine you need. These products come in several forms, many of them available over-the-counter:  ¨ Nicotine patches  ¨ Nicotine gum and lozenges  ¨ Nicotine inhaler  · Ask your doctor about bupropion (Wellbutrin) or varenicline (Chantix), which are prescription medicines. They do not contain nicotine. They help you by reducing withdrawal symptoms, such as stress and anxiety. · Some people find hypnosis, acupuncture, and massage helpful for ending the smoking habit. · Eat a healthy diet and get regular exercise. Having healthy habits will help your body move past its craving for nicotine. · Be prepared to keep trying. Most people are not successful the first few times they try to quit. Do not get mad at yourself if you smoke again. Make a list of things you learned and think about when you want to try again, such as next week, next month, or next year. Where can you learn more? Go to http://joesph-leia.info/. Enter L852 in the search box to learn more about \"Stopping Smoking: Care Instructions. \"  Current as of: May 26, 2016  Content Version: 11.2  © 9830-2123 [employer]. Care instructions adapted under license by [employer] (which disclaims liability or warranty for this information). If you have questions about a medical condition or this instruction, always ask your healthcare professional. [address] any warranty or liability for your use of this information. Upper Respiratory Infection (Cold): Care Instructions  Your Care Instructions    An upper respiratory infection, or URI, is an infection of the nose, sinuses, or throat. URIs are spread by coughs, sneezes, and direct contact. The common cold is the most frequent kind of URI. The flu and sinus infections are other kinds of URIs. Almost all URIs are caused by viruses. Antibiotics won't cure them. But you can treat most infections with home care. This may include drinking lots of fluids and taking over-the-counter pain medicine. You will probably feel better in 4 to 10 days. The doctor has checked you carefully, but problems can develop later. If you notice any problems or new symptoms, get medical treatment right away. Follow-up care is a key part of your treatment and safety. Be sure to make and go to all appointments, and call your doctor if you are having problems. It's also a good idea to know your test results and keep a list of the medicines you take. How can you care for yourself at home? · To prevent dehydration, drink plenty of fluids, enough so that your urine is light yellow or clear like water. Choose water and other caffeine-free clear liquids until you feel better. If you have kidney, heart, or liver disease and have to limit fluids, talk with your doctor before you increase the amount of fluids you drink. · Take an over-the-counter pain medicine, such as acetaminophen (Tylenol), ibuprofen (Advil, Motrin), or naproxen (Aleve). Read and follow all instructions on the label.   · Before you use cough and cold medicines, check the label. These medicines may not be safe for young children or for people with certain health problems. · Be careful when taking over-the-counter cold or flu medicines and Tylenol at the same time. Many of these medicines have acetaminophen, which is Tylenol. Read the labels to make sure that you are not taking more than the recommended dose. Too much acetaminophen (Tylenol) can be harmful. · Get plenty of rest.  · Do not smoke or allow others to smoke around you. If you need help quitting, talk to your doctor about stop-smoking programs and medicines. These can increase your chances of quitting for good. When should you call for help? Call 911 anytime you think you may need emergency care. For example, call if:  · You have severe trouble breathing. Call your doctor now or seek immediate medical care if:  · You seem to be getting much sicker. · You have new or worse trouble breathing. · You have a new or higher fever. · You have a new rash. Watch closely for changes in your health, and be sure to contact your doctor if:  · You have a new symptom, such as a sore throat, an earache, or sinus pain. · You cough more deeply or more often, especially if you notice more mucus or a change in the color of your mucus. · You do not get better as expected. Where can you learn more? Go to http://joesph-leia.info/. Enter U866 in the search box to learn more about \"Upper Respiratory Infection (Cold): Care Instructions. \"  Current as of: June 30, 2016  Content Version: 11.2  © 3308-2346 [employer]. Care instructions adapted under license by [employer] (which disclaims liability or warranty for this information). If you have questions about a medical condition or this instruction, always ask your healthcare professional. [address] any warranty or liability for your use of this information.

## 2019-01-30 ENCOUNTER — OFFICE VISIT (OUTPATIENT)
Dept: FAMILY MEDICINE CLINIC | Age: 34
End: 2019-01-30

## 2019-01-30 ENCOUNTER — TELEPHONE (OUTPATIENT)
Dept: FAMILY MEDICINE CLINIC | Age: 34
End: 2019-01-30

## 2019-01-30 VITALS
WEIGHT: 224.8 LBS | OXYGEN SATURATION: 97 % | SYSTOLIC BLOOD PRESSURE: 161 MMHG | TEMPERATURE: 97.9 F | HEART RATE: 107 BPM | DIASTOLIC BLOOD PRESSURE: 122 MMHG | BODY MASS INDEX: 38.38 KG/M2 | HEIGHT: 64 IN | RESPIRATION RATE: 16 BRPM

## 2019-01-30 DIAGNOSIS — F17.200 SMOKER: ICD-10-CM

## 2019-01-30 DIAGNOSIS — I10 ESSENTIAL HYPERTENSION: ICD-10-CM

## 2019-01-30 DIAGNOSIS — L20.82 FLEXURAL ECZEMA: ICD-10-CM

## 2019-01-30 DIAGNOSIS — F41.8 DEPRESSION WITH ANXIETY: Primary | ICD-10-CM

## 2019-01-30 RX ORDER — BUPROPION HYDROCHLORIDE 150 MG/1
150 TABLET, EXTENDED RELEASE ORAL
COMMUNITY
Start: 2018-10-01 | End: 2019-01-30 | Stop reason: SDDI

## 2019-01-30 RX ORDER — RANITIDINE 150 MG/1
600 TABLET, FILM COATED ORAL 2 TIMES DAILY
COMMUNITY
End: 2020-01-20 | Stop reason: ALTCHOICE

## 2019-01-30 RX ORDER — LISINOPRIL 20 MG/1
20 TABLET ORAL DAILY
Qty: 30 TAB | Refills: 2 | Status: SHIPPED | OUTPATIENT
Start: 2019-01-30 | End: 2019-03-13

## 2019-01-30 RX ORDER — NORETHINDRONE ACETATE AND ETHINYL ESTRADIOL 1MG-20(21)
KIT ORAL
COMMUNITY
Start: 2018-09-27 | End: 2020-05-22 | Stop reason: ALTCHOICE

## 2019-01-30 RX ORDER — TRIAMCINOLONE ACETONIDE 1 MG/G
OINTMENT TOPICAL 2 TIMES DAILY
Qty: 30 G | Refills: 0 | Status: SHIPPED | OUTPATIENT
Start: 2019-01-30 | End: 2020-01-06 | Stop reason: ALTCHOICE

## 2019-01-30 RX ORDER — BUSPIRONE HYDROCHLORIDE 15 MG/1
15 TABLET ORAL
Qty: 30 TAB | Refills: 1 | Status: SHIPPED | OUTPATIENT
Start: 2019-01-30 | End: 2019-02-27

## 2019-01-30 RX ORDER — SERTRALINE HYDROCHLORIDE 25 MG/1
25 TABLET, FILM COATED ORAL DAILY
Qty: 30 TAB | Refills: 1 | Status: SHIPPED | OUTPATIENT
Start: 2019-01-30 | End: 2019-02-27

## 2019-01-30 NOTE — TELEPHONE ENCOUNTER
----- Message from [address] sent at 1/30/2019  3:39 PM EST -----  Regarding: Dr. Jennifer Luna  Pt returned call.  Best contact number is [phone]

## 2019-01-30 NOTE — PROGRESS NOTES
Chief Complaint Patient presents with  Blood Pressure Check  Panic Attack Had HTN during pregancy. Having panic attacks, not taking Wellbutrin, doesn't like the way it makes her feel. When took BP for 2nd time she became tearfull. BP was 161/122 at this time. 1. Have you been to the ER, urgent care clinic since your last visit? Hospitalized since your last visit? yes 2. Have you seen or consulted any other health care providers outside of the [address] [place] [address] since your last visit? Include any pap smears or colon screening. Therapist 
 
Declined Flu Vac

## 2019-01-30 NOTE — PROGRESS NOTES
Cathy Watts is a 35 y.o. female who presents for Patient's last menstrual period was 01/20/2019 (exact date). I last saw this patient 03/2017. Since then she have seen other PCP 3 times. has a past medical history of Asthma, Chronic diarrhea of unknown origin (3/10/2017), Dairy product intolerance (3/10/2017), Depression with anxiety (1/30/2019), Essential hypertension (1/30/2019), Family history of diabetes mellitus (3/10/2017), Gastroesophageal reflux disease without esophagitis (3/10/2017), History of seasonal allergies, Hypertension, Psychotic disorder ([facility] [address] Sciatica, right side (3/10/2017), Smoker (2/23/2016), and Smoker (2/23/2016). Delivered baby 07/2018. Not breast feeding. Depression, anxiety Since then was told having postpartum depression. Report hx of anxiety and depression in the past was treated with Wellbutrin, but never this bad. Mother had depression. Is seeing Therapist Dr. Luis Parra. Denies SI or HI. Not sleeping well Anxiety, panic attacks. Single, unemployed, staying with mother. Discussed treatment, options, expectation We'll start zoloft. HTN: BP high today. Hx of HTN. Was on nifedipine during pregnancy. Hand eczema Smoker: was put on wellbutrin SR, \"doesn't like the way it makes me feel. \" Allergies Allergen Reactions  Chantix [Varenicline] Nausea Only Current Outpatient Medications on File Prior to Visit Medication Sig Dispense Refill  norethindrone-ethinyl estradiol (JUNEL FE 1/20, 28,) 1 mg-20 mcg (21)/75 mg (7) tab TK 1 T PO QD    
 raNITIdine (ZANTAC) 150 mg tablet Take 600 mg by mouth two (2) times a day.  omeprazole (PRILOSEC) 20 mg capsule Take 1 Cap by mouth daily. 30 Cap 2 No current facility-administered medications on file prior to visit. Past Medical History:  
Diagnosis Date  Asthma No problems as adult or teenager--last problems as child  Chronic diarrhea of unknown origin 3/10/2017  Dairy product intolerance 3/10/2017  Depression with anxiety 1/30/2019  Essential hypertension 1/30/2019  Family history of diabetes mellitus 3/10/2017  Gastroesophageal reflux disease without esophagitis 3/10/2017  History of seasonal allergies Allegra or Claritin--equally effective.  Hypertension  Psychotic disorder ([facility] [address]  Sciatica, right side 3/10/2017  Smoker 2/23/2016  Smoker 2/23/2016 Past Surgical History:  
Procedure Laterality Date  PAP SMEAR  10/11 Family History Problem Relation Age of Onset  Diabetes Mother  No Known Problems Father  Hypertension Maternal Aunt  Heart Disease Maternal Grandmother CAD  Diabetes Maternal Grandmother  Hypertension Maternal Grandmother  No Known Problems Sister Social History Tobacco Use  Smoking status: Current Every Day Smoker Packs/day: 0.50 Years: 20.00 Pack years: 10.00 Types: Cigarettes  Smokeless tobacco: Never Used Substance Use Topics  Alcohol use: Yes Comment: 3 pints of burbon weekly  Drug use: No  
 
 
Review of Systems A comprehensive review of systems was negative except for that written in the HPI. Objective:  
 
Visit Vitals BP (!) 161/122 Pulse (!) 107 Temp 97.9 °F (36.6 °C) (Oral) Resp 16 Ht 5' 4\" (1.626 m) Wt 224 lb 12.8 oz (102 kg) LMP 01/20/2019 (Exact Date) SpO2 97% BMI 38.59 kg/m² General:  alert, cooperative, no distress, appears stated age Head:  NCAT w/o lesions or tenderness Lungs: clear to auscultation bilaterally Heart:  regular rate and rhythm, S1, S2 normal, no murmur, click, rub or gallop Neuro: normal without focal findings 
mental status, speech normal, alert and oriented x iii LOUANN 
fundi are normal 
cranial nerves 2-12 intact Affect & Behavior:  good grooming, full facial expressions, normal speech pattern and content, normal thought patterns, normal perception, good insight, normal reasoning. Was tearful during exam.   
 
Hand eczema palm surface bilat. Assessment/ Plan Diagnoses and all orders for this visit: 1. Depression with anxiety 
-     sertraline (ZOLOFT) 25 mg tablet; Take 1 Tab by mouth daily. -     busPIRone (BUSPAR) 15 mg tablet; Take 1 Tab by mouth two (2) times daily as needed. 2. Essential hypertension 
-     lisinopril (PRINIVIL, ZESTRIL) 20 mg tablet; Take 1 Tab by mouth daily. 3. Smoker 4. Flexural eczema 
-     triamcinolone acetonide (KENALOG) 0.1 % ointment; Apply  to affected area two (2) times a day. use thin layer Follow-up Disposition: 
Return in about 4 weeks (around 2/27/2019) for HTN, depression, anxiety, sooner if not better. · Patient Education:  Reviewed concept of anxiety as biochemical imbalance of neurotransmitters and rationale for treatment. Instructed patient to contact office or on-call physician promptly should condition worsen or any new symptoms appear and provided on-call telephone numbers. IF THE PATIENT HAS ANY SUICIDAL OR HOMICIDAL IDEATION, CALL THE OFFICE, DISCUSS WITH A SUPPORT MEMBER OR GO TO THE ER IMMEDIATELY. Patient was agreeable with this plan. Attila Everett MD 
1/30/2019

## 2019-01-30 NOTE — TELEPHONE ENCOUNTER
----- Message from Mak Salomon sent at 1/30/2019  2:01 PM EST -----  Regarding: Dr. Ike Pereira  Pt is stating that Rx Buspirone and Eczema cream was not sent to [facility]  (on file). Best contact number  [phone].

## 2019-01-31 DIAGNOSIS — F41.8 DEPRESSION WITH ANXIETY: Primary | ICD-10-CM

## 2019-01-31 RX ORDER — HYDROXYZINE 25 MG/1
25 TABLET, FILM COATED ORAL
Qty: 30 TAB | Refills: 1 | Status: SHIPPED | OUTPATIENT
Start: 2019-01-31 | End: 2019-02-10

## 2019-01-31 NOTE — TELEPHONE ENCOUNTER
MD Linda Petit LPN                i've sent in Hydroxyzine since buspar on back order.      Xander George MD   1/31/2019      Patient notified

## 2019-02-27 ENCOUNTER — OFFICE VISIT (OUTPATIENT)
Dept: FAMILY MEDICINE CLINIC | Age: 34
End: 2019-02-27

## 2019-02-27 VITALS
BODY MASS INDEX: 38.93 KG/M2 | DIASTOLIC BLOOD PRESSURE: 89 MMHG | HEART RATE: 92 BPM | RESPIRATION RATE: 16 BRPM | SYSTOLIC BLOOD PRESSURE: 141 MMHG | TEMPERATURE: 98.1 F | WEIGHT: 228 LBS | OXYGEN SATURATION: 98 % | HEIGHT: 64 IN

## 2019-02-27 DIAGNOSIS — I10 ESSENTIAL HYPERTENSION: ICD-10-CM

## 2019-02-27 DIAGNOSIS — F41.8 DEPRESSION WITH ANXIETY: Primary | ICD-10-CM

## 2019-02-27 DIAGNOSIS — Z79.899 ENCOUNTER FOR LONG-TERM (CURRENT) USE OF MEDICATIONS: ICD-10-CM

## 2019-02-27 PROBLEM — E66.01 SEVERE OBESITY (HCC): Status: ACTIVE | Noted: 2019-02-27

## 2019-02-27 PROBLEM — Z83.3 FAMILY HISTORY OF DIABETES MELLITUS: Status: RESOLVED | Noted: 2017-03-10 | Resolved: 2019-02-27

## 2019-02-27 RX ORDER — CLONAZEPAM 0.5 MG/1
0.5 TABLET ORAL
Qty: 20 TAB | Refills: 0 | Status: SHIPPED | OUTPATIENT
Start: 2019-02-27 | End: 2021-01-06 | Stop reason: SDUPTHER

## 2019-02-27 RX ORDER — ESCITALOPRAM OXALATE 10 MG/1
10 TABLET ORAL DAILY
Qty: 30 TAB | Refills: 1 | Status: SHIPPED | OUTPATIENT
Start: 2019-02-27 | End: 2019-03-13 | Stop reason: SDUPTHER

## 2019-02-27 RX ORDER — HYDROXYZINE 25 MG/1
25 TABLET, FILM COATED ORAL
COMMUNITY
End: 2019-02-27

## 2019-02-27 NOTE — PROGRESS NOTES
Chief Complaint Patient presents with  Depression Check meds. meds making her drowsy and not helping panic attacks. 1. Have you been to the ER, urgent care clinic since your last visit? Hospitalized since your last visit? no 
 
2. Have you seen or consulted any other health care providers outside of the [address] [place] [address] since your last visit? Include any pap smears or colon screening. yes

## 2019-02-27 NOTE — PROGRESS NOTES
Carmita Freitas is a 35 y.o. female who presents for Patient's last menstrual period was 02/17/2019. 
 
2nd visit with this physician.  
 
 has a past medical history of Asthma, Chronic diarrhea of unknown origin (3/10/2017), Dairy product intolerance (3/10/2017), Depression with anxiety (1/30/2019), Essential hypertension (1/30/2019), Gastroesophageal reflux disease without esophagitis (3/10/2017), History of seasonal allergies, Psychotic disorder ([facility] [address] Sciatica, right side (3/10/2017), and Smoker (2/23/2016). Delivered baby 07/2018. Not breast feeding. Depression, anxiety Since then was told having postpartum depression. Report hx of anxiety and depression in the past was treated with Wellbutrin, but never this bad. Mother had depression. Is seeing Therapist Dr. Odalys Govea. Denies SI or HI. Not sleeping well Anxiety, panic attacks. Single, unemployed, staying with mother. Discussed treatment, options, expectation Last visit we started 25mg zoloft. It makes me feel tired. D/c zoloft, d/c hydroxyzine We'll start lexapro. We discussed addiction and side effect of klonopin prn. HTN: BP high today. Last visit we started Lisinopril. GERD: PPI Smoker: was put on wellbutrin SR, \"doesn't like the way it makes me feel. \" Allergies Allergen Reactions  Chantix [Varenicline] Nausea Only Current Outpatient Medications on File Prior to Visit Medication Sig Dispense Refill  norethindrone-ethinyl estradiol (JUNEL FE 1/20, 28,) 1 mg-20 mcg (21)/75 mg (7) tab TK 1 T PO QD    
 raNITIdine (ZANTAC) 150 mg tablet Take 600 mg by mouth two (2) times a day.  lisinopril (PRINIVIL, ZESTRIL) 20 mg tablet Take 1 Tab by mouth daily. 30 Tab 2  
 triamcinolone acetonide (KENALOG) 0.1 % ointment Apply  to affected area two (2) times a day. use thin layer 30 g 0  
 omeprazole (PRILOSEC) 20 mg capsule Take 1 Cap by mouth daily.  30 Cap 2  
 
 No current facility-administered medications on file prior to visit. Past Medical History:  
Diagnosis Date  Asthma No problems as adult or teenager--last problems as child  Chronic diarrhea of unknown origin 3/10/2017  Dairy product intolerance 3/10/2017  Depression with anxiety 1/30/2019  Essential hypertension 1/30/2019  Gastroesophageal reflux disease without esophagitis 3/10/2017  History of seasonal allergies Allegra or Claritin--equally effective.  Psychotic disorder ([address]  Sciatica, right side 3/10/2017  Smoker 2/23/2016 Past Surgical History:  
Procedure Laterality Date  PAP SMEAR  10/11 Family History Problem Relation Age of Onset  Diabetes Mother  No Known Problems Father  Hypertension Maternal Aunt  Heart Disease Maternal Grandmother CAD  Diabetes Maternal Grandmother  Hypertension Maternal Grandmother  No Known Problems Sister Social History Tobacco Use  Smoking status: Current Every Day Smoker Packs/day: 0.50 Years: 20.00 Pack years: 10.00 Types: Cigarettes  Smokeless tobacco: Never Used Substance Use Topics  Alcohol use: Yes Comment: 3 pints of burbon weekly  Drug use: No  
 
 
Review of Systems A comprehensive review of systems was negative except for that written in the HPI. Objective:  
 
Visit Vitals /89 Pulse 92 Temp 98.1 °F (36.7 °C) (Oral) Resp 16 Ht 5' 4\" (1.626 m) Wt 228 lb (103.4 kg) LMP 02/17/2019 SpO2 98% BMI 39.14 kg/m² General:  alert, cooperative, no distress, appears stated age Head:  NCAT w/o lesions or tenderness Lungs: clear to auscultation bilaterally Heart:  regular rate and rhythm, S1, S2 normal, no murmur, click, rub or gallop Neuro: normal without focal findings 
mental status, speech normal, alert and oriented x iii LOUANN 
fundi are normal 
cranial nerves 2-12 intact Affect & Behavior:  good grooming, full facial expressions, normal speech pattern and content, normal thought patterns, normal perception, good insight, normal reasoning. Was tearful during exam.   
 
 
    
Assessment/ Plan Diagnoses and all orders for this visit: 1. Depression with anxiety -     METABOLIC PANEL, COMPREHENSIVE 
-     TSH 3RD GENERATION 
-     escitalopram oxalate (LEXAPRO) 10 mg tablet; Take 1 Tab by mouth daily. -     clonazePAM (KLONOPIN) 0.5 mg tablet; Take 1 Tab by mouth nightly as needed (anxiety). Max Daily Amount: 0.5 mg. 
 
2. Essential hypertension 3. Encounter for long-term (current) use of medications 
-     TSH 3RD GENERATION Follow-up Disposition: 
Return in about 2 weeks (around 3/13/2019) for axiety depression. · Patient Education:  Reviewed concept of anxiety as biochemical imbalance of neurotransmitters and rationale for treatment. Instructed patient to contact office or on-call physician promptly should condition worsen or any new symptoms appear and provided on-call telephone numbers. IF THE PATIENT HAS ANY SUICIDAL OR HOMICIDAL IDEATION, CALL THE OFFICE, DISCUSS WITH A SUPPORT MEMBER OR GO TO THE ER IMMEDIATELY. Patient was agreeable with this plan. India Castellanos MD 
2/27/2019

## 2019-03-13 ENCOUNTER — OFFICE VISIT (OUTPATIENT)
Dept: FAMILY MEDICINE CLINIC | Age: 34
End: 2019-03-13

## 2019-03-13 VITALS
WEIGHT: 227.6 LBS | BODY MASS INDEX: 38.86 KG/M2 | HEIGHT: 64 IN | DIASTOLIC BLOOD PRESSURE: 100 MMHG | OXYGEN SATURATION: 97 % | TEMPERATURE: 98.2 F | RESPIRATION RATE: 18 BRPM | HEART RATE: 102 BPM | SYSTOLIC BLOOD PRESSURE: 154 MMHG

## 2019-03-13 DIAGNOSIS — I10 ESSENTIAL HYPERTENSION: Primary | ICD-10-CM

## 2019-03-13 DIAGNOSIS — F41.8 DEPRESSION WITH ANXIETY: ICD-10-CM

## 2019-03-13 RX ORDER — METOPROLOL SUCCINATE 25 MG/1
25 TABLET, EXTENDED RELEASE ORAL DAILY
Qty: 30 TAB | Refills: 2 | Status: SHIPPED | OUTPATIENT
Start: 2019-03-13 | End: 2019-06-05 | Stop reason: SDUPTHER

## 2019-03-13 RX ORDER — ESCITALOPRAM OXALATE 20 MG/1
20 TABLET ORAL DAILY
Qty: 30 TAB | Refills: 2 | Status: SHIPPED | OUTPATIENT
Start: 2019-03-13 | End: 2019-04-08 | Stop reason: SDUPTHER

## 2019-03-13 NOTE — PROGRESS NOTES
Lynda Cortez is a 35 y.o. female who presents for     Patient's last menstrual period was 02/17/2019.    2nd visit with this physician.      has a past medical history of Asthma, Chronic diarrhea of unknown origin (3/10/2017), Dairy product intolerance (3/10/2017), Depression with anxiety (1/30/2019), Essential hypertension (1/30/2019), Gastroesophageal reflux disease without esophagitis (3/10/2017), History of seasonal allergies, Psychotic disorder ([facility] [address] Sciatica, right side (3/10/2017), and Smoker (2/23/2016). Delivered baby 07/2018. Not breast feeding. Depression, anxiety  Since then was told having postpartum depression. Report hx of anxiety and depression in the past was treated with Wellbutrin, but never this bad. Mother had depression. Is seeing Therapist Dr. Shabbir Toscano. Denies SI or HI. Not sleeping well  Anxiety, panic attacks. Single, unemployed, staying with mother. Discussed treatment, options, expectation  Last visit we started lexapro 10mg 2 weeks ago. No side effect but she didn't notice any improvement. She had 3 panics attacks took klonopin 0.5mg, made her really sleepy. Advise to try 1/4 or 1/2 as needed     HTN: BP high today. Lisinopril not helping but is getting lot of dry cough. D/c lisinopril. We'll start metoprolol 25mg     GERD: PPI    Smoker: was put on wellbutrin SR, \"doesn't like the way it makes me feel. \"      Allergies   Allergen Reactions    Chantix [Varenicline] Nausea Only     Current Outpatient Medications on File Prior to Visit   Medication Sig Dispense Refill    clonazePAM (KLONOPIN) 0.5 mg tablet Take 1 Tab by mouth nightly as needed (anxiety). Max Daily Amount: 0.5 mg. 20 Tab 0    norethindrone-ethinyl estradiol (JUNEL FE 1/20, 28,) 1 mg-20 mcg (21)/75 mg (7) tab TK 1 T PO QD      triamcinolone acetonide (KENALOG) 0.1 % ointment Apply  to affected area two (2) times a day.  use thin layer 30 g 0    omeprazole (PRILOSEC) 20 mg capsule Take 1 Cap by mouth daily. 30 Cap 2    raNITIdine (ZANTAC) 150 mg tablet Take 600 mg by mouth two (2) times a day. No current facility-administered medications on file prior to visit. Past Medical History:   Diagnosis Date    Asthma     No problems as adult or teenager--last problems as child    Chronic diarrhea of unknown origin 3/10/2017    Dairy product intolerance 3/10/2017    Depression with anxiety 1/30/2019    Essential hypertension 1/30/2019    Gastroesophageal reflux disease without esophagitis 3/10/2017    History of seasonal allergies     Allegra or Claritin--equally effective.  Psychotic disorder ([facility] [address]     Sciatica, right side 3/10/2017    Smoker 2/23/2016       Past Surgical History:   Procedure Laterality Date    PAP SMEAR  10/11       Family History   Problem Relation Age of Onset    Diabetes Mother     No Known Problems Father     Hypertension Maternal Aunt     Heart Disease Maternal Grandmother         CAD    Diabetes Maternal Grandmother     Hypertension Maternal Grandmother     No Known Problems Sister        Social History     Tobacco Use    Smoking status: Current Every Day Smoker     Packs/day: 0.50     Years: 20.00     Pack years: 10.00     Types: Cigarettes    Smokeless tobacco: Never Used   Substance Use Topics    Alcohol use: Yes     Comment: 3 pints of burbon weekly    Drug use: No       Review of Systems  A comprehensive review of systems was negative except for that written in the HPI.     Objective:     Visit Vitals  BP (!) 154/100   Pulse (!) 114   Temp 98.2 °F (36.8 °C) (Oral)   Resp 18   Ht 5' 4\" (1.626 m)   Wt 227 lb 9.6 oz (103.2 kg)   LMP 02/17/2019   SpO2 97%   BMI 39.07 kg/m²        General:  alert, cooperative, no distress, appears stated age   Head:  NCAT w/o lesions or tenderness   Lungs: clear to auscultation bilaterally   Heart:  regular rate and rhythm, S1, S2 normal, no murmur, click, rub or gallop   Neuro: normal without focal findings  mental status, speech normal, alert and oriented x iii  LOUANN  fundi are normal  cranial nerves 2-12 intact   Affect & Behavior:  good grooming, full facial expressions, normal speech pattern and content, normal thought patterns, normal perception, good insight, normal reasoning. Was tearful during exam.             Assessment/ Plan     Diagnoses and all orders for this visit:    1. Essential hypertension  -     metoprolol succinate (TOPROL-XL) 25 mg XL tablet; Take 1 Tab by mouth daily. 2. Depression with anxiety  -     escitalopram oxalate (LEXAPRO) 20 mg tablet; Take 1 Tab by mouth daily. Follow-up Disposition:  Return in about 6 weeks (around 4/24/2019) for HTN, anxiety. · Patient Education:  Reviewed concept of anxiety as biochemical imbalance of neurotransmitters and rationale for treatment. Instructed patient to contact office or on-call physician promptly should condition worsen or any new symptoms appear and provided on-call telephone numbers. IF THE PATIENT HAS ANY SUICIDAL OR HOMICIDAL IDEATION, CALL THE OFFICE, DISCUSS WITH A SUPPORT MEMBER OR GO TO THE ER IMMEDIATELY. Patient was agreeable with this plan.       Kishor Rolle MD  3/13/2019

## 2019-03-13 NOTE — PROGRESS NOTES
Chief Complaint   Patient presents with    Anxiety    Depression     2 week check. She says that BP med making her cough. 1. Have you been to the ER, urgent care clinic since your last visit? Hospitalized since your last visit? no    2. Have you seen or consulted any other health care providers outside of the [address] [place] [address] since your last visit? Include any pap smears or colon screening.  no

## 2019-04-08 DIAGNOSIS — F41.8 DEPRESSION WITH ANXIETY: ICD-10-CM

## 2019-04-08 RX ORDER — ESCITALOPRAM OXALATE 20 MG/1
20 TABLET ORAL DAILY
Qty: 90 TAB | Refills: 0 | Status: SHIPPED | OUTPATIENT
Start: 2019-04-08 | End: 2020-09-28

## 2019-06-05 DIAGNOSIS — I10 ESSENTIAL HYPERTENSION: ICD-10-CM

## 2019-06-05 RX ORDER — METOPROLOL SUCCINATE 25 MG/1
TABLET, EXTENDED RELEASE ORAL
Qty: 30 TAB | Refills: 2 | Status: SHIPPED | OUTPATIENT
Start: 2019-06-05 | End: 2019-09-04 | Stop reason: SDUPTHER

## 2019-09-04 DIAGNOSIS — I10 ESSENTIAL HYPERTENSION: ICD-10-CM

## 2019-09-04 RX ORDER — METOPROLOL SUCCINATE 25 MG/1
TABLET, EXTENDED RELEASE ORAL
Qty: 30 TAB | Refills: 0 | Status: SHIPPED | OUTPATIENT
Start: 2019-09-04 | End: 2019-09-27 | Stop reason: SDUPTHER

## 2019-09-04 NOTE — LETTER
10/8/2019 3:39 PM 
 
Ms. Elina Turner 
[facility] [address] [id number] Ms. Elina Turner, Please call office to schedule appointment to be seen. We have not seen you in over 6  
 
months. You need to be seen to review medications and labs. Please call [phone] to schedule appointment. Sincerely, Brennan Sampson MD

## 2019-09-04 NOTE — TELEPHONE ENCOUNTER
Pt Last seen >6 months ago  Med refill 1 month until f/u  pls call for F/u appointment    thx    Mili Zhou MD  4/3/2018

## 2019-09-27 DIAGNOSIS — I10 ESSENTIAL HYPERTENSION: ICD-10-CM

## 2019-10-01 RX ORDER — METOPROLOL SUCCINATE 25 MG/1
TABLET, EXTENDED RELEASE ORAL
Qty: 30 TAB | Refills: 0 | Status: SHIPPED | OUTPATIENT
Start: 2019-10-01 | End: 2019-11-14 | Stop reason: SDUPTHER

## 2019-10-01 NOTE — TELEPHONE ENCOUNTER
Pt Last seen >7 months ago  Med refill 1 month until f/u  pls call for F/u appointment    cristela Guo MD  4/3/2018

## 2019-11-13 DIAGNOSIS — I10 ESSENTIAL HYPERTENSION: ICD-10-CM

## 2019-11-13 RX ORDER — METOPROLOL SUCCINATE 25 MG/1
TABLET, EXTENDED RELEASE ORAL
Qty: 30 TAB | Refills: 0 | OUTPATIENT
Start: 2019-11-13

## 2019-11-13 NOTE — TELEPHONE ENCOUNTER
Pt last seen > 8months    Meds refill refused. Appointment needed    Thanks.      Mayco Hou MD  11/13/2019

## 2019-11-14 ENCOUNTER — TELEPHONE (OUTPATIENT)
Dept: FAMILY MEDICINE CLINIC | Age: 34
End: 2019-11-14

## 2019-11-14 DIAGNOSIS — I10 ESSENTIAL HYPERTENSION: ICD-10-CM

## 2019-11-14 NOTE — TELEPHONE ENCOUNTER
----- Message from Hanh Roth sent at 11/14/2019  4:14 PM EST -----  Regarding: Dr. Deyanira Tavera  Contact: [phone]  Caller's first and last name and relationship (if not the patient): n/a  Best contact number(s): [phone]  Whose call is being returned: n/a  Details to clarify the request: Pt would like for nurse to give her a call.

## 2019-11-14 NOTE — TELEPHONE ENCOUNTER
----- Message from Meghanaurea Ndiaye sent at 11/14/2019  4:15 PM EST -----  Regarding: Dr. Krystle Yarbrough  Contact: [phone]  Caller (if not patient): n/a  Relationship of caller (if not patient): n/a  Best contact number(s): [phone]  Name of medication and dosage if known:  Metoprolol  Is patient out of this medication (yes/no): yes  Pharmacy name: [facility]  Pharmacy listed in chart? (yes/no): yes  Pharmacy phone number: n/a  Date of last visit: 0313/2019  Details to clarify the request: n/a

## 2019-11-15 RX ORDER — METOPROLOL SUCCINATE 25 MG/1
TABLET, EXTENDED RELEASE ORAL
Qty: 30 TAB | Refills: 0 | Status: SHIPPED | OUTPATIENT
Start: 2019-11-15 | End: 2020-02-07

## 2020-01-06 ENCOUNTER — OFFICE VISIT (OUTPATIENT)
Dept: FAMILY MEDICINE CLINIC | Age: 35
End: 2020-01-06

## 2020-01-06 VITALS
RESPIRATION RATE: 18 BRPM | DIASTOLIC BLOOD PRESSURE: 89 MMHG | HEART RATE: 79 BPM | OXYGEN SATURATION: 98 % | SYSTOLIC BLOOD PRESSURE: 132 MMHG | WEIGHT: 247.2 LBS | TEMPERATURE: 98 F | HEIGHT: 64 IN | BODY MASS INDEX: 42.2 KG/M2

## 2020-01-06 DIAGNOSIS — F33.0 DEPRESSION, MAJOR, RECURRENT, MILD (HCC): ICD-10-CM

## 2020-01-06 DIAGNOSIS — I10 ESSENTIAL HYPERTENSION: ICD-10-CM

## 2020-01-06 DIAGNOSIS — Z91.199 NON-COMPLIANCE: ICD-10-CM

## 2020-01-06 DIAGNOSIS — Z79.899 ENCOUNTER FOR LONG-TERM (CURRENT) USE OF MEDICATIONS: ICD-10-CM

## 2020-01-06 DIAGNOSIS — Z13.220 SCREENING CHOLESTEROL LEVEL: ICD-10-CM

## 2020-01-06 DIAGNOSIS — Z13.1 SCREENING FOR DIABETES MELLITUS: ICD-10-CM

## 2020-01-06 DIAGNOSIS — Z00.00 ANNUAL PHYSICAL EXAM: Primary | ICD-10-CM

## 2020-01-06 NOTE — PROGRESS NOTES
Chief Complaint   Patient presents with    Hypertension    Anxiety     Check meds & labs    1. Have you been to the ER, urgent care clinic since your last visit? Hospitalized since your last visit? no    2. Have you seen or consulted any other health care providers outside of the [address] [place] [address] since your last visit? Include any pap smears or colon screening.  no

## 2020-01-06 NOTE — PROGRESS NOTES
Kavita Rivera is a 29 y.o. female who presents for     Patient's last menstrual period was 12/21/2019 (exact date). This patient is noncompliant. She didn't f/u like we discussed, it's been 10 months. She's also not taking meds regularly  She also never did labs I gave her. I do not have a single labs. We will not refill medication unless she do labs. has a past medical history of Asthma, Chronic diarrhea of unknown origin (3/10/2017), Dairy product intolerance (3/10/2017), Depression with anxiety (1/30/2019), Essential hypertension (1/30/2019), Gastroesophageal reflux disease without esophagitis (3/10/2017), History of seasonal allergies, Non-compliance (1/6/2020), [facility] Sciatica, right side (3/10/2017), and Smoker (2/23/2016). Depression, anxiety  Since then was told having postpartum depression. Report hx of anxiety and depression in the past was treated with Wellbutrin, but never this bad. Mother had depression. Is seeing Therapist Dr. Manuel Garcia. Denies SI or HI. Not sleeping well. Anxiety, panic attacks. Single, unemployed, staying with mother. Discussed treatment, options, expectation  Last visit we started lexapro 10mg 2 weeks ago. No side effect but she didn't notice any improvement. She's not taking it consistently still have prescription from 04/2019. She just takes it when she feels overwhelmed    HTN: BP high normal today. Lisinopril not helping but is getting lot of dry cough. Last visit we D/c lisinopril. Start metoprolol 25mg   We'll continue metoprolol    GERD: PPI    She was on birth control. Smoker: was put on wellbutrin SR, \"doesn't like the way it makes me feel. \"      Allergies   Allergen Reactions    Chantix [Varenicline] Nausea Only     Current Outpatient Medications on File Prior to Visit   Medication Sig Dispense Refill    metoprolol succinate (TOPROL-XL) 25 mg XL tablet TAKE 1 TABLET BY MOUTH EVERY DAY 30 Tab 0    clonazePAM (KLONOPIN) 0.5 mg tablet Take 1 Tab by mouth nightly as needed (anxiety). Max Daily Amount: 0.5 mg. 20 Tab 0    omeprazole (PRILOSEC) 20 mg capsule Take 1 Cap by mouth daily. 30 Cap 2    escitalopram oxalate (LEXAPRO) 20 mg tablet Take 1 Tab by mouth daily. 90 Tab 0    norethindrone-ethinyl estradiol (JUNEL FE 1/20, 28,) 1 mg-20 mcg (21)/75 mg (7) tab TK 1 T PO QD      raNITIdine (ZANTAC) 150 mg tablet Take 600 mg by mouth two (2) times a day. No current facility-administered medications on file prior to visit. Past Medical History:   Diagnosis Date    Asthma     No problems as adult or teenager--last problems as child    Chronic diarrhea of unknown origin 3/10/2017    Dairy product intolerance 3/10/2017    Depression with anxiety 1/30/2019    Essential hypertension 1/30/2019    Gastroesophageal reflux disease without esophagitis 3/10/2017    History of seasonal allergies     Allegra or Claritin--equally effective.  Non-compliance 1/6/2020    Psychotic disorder ([facility]     Sciatica, right side 3/10/2017    Smoker 2/23/2016       Past Surgical History:   Procedure Laterality Date    PAP SMEAR  10/11       Family History   Problem Relation Age of Onset    Diabetes Mother     No Known Problems Father     Hypertension Maternal Aunt     Heart Disease Maternal Grandmother         CAD    Diabetes Maternal Grandmother     Hypertension Maternal Grandmother     No Known Problems Sister        Social History     Tobacco Use    Smoking status: Current Every Day Smoker     Packs/day: 0.50     Years: 20.00     Pack years: 10.00     Types: Cigarettes    Smokeless tobacco: Never Used   Substance Use Topics    Alcohol use: Yes     Comment: 3 pints of burbon weekly    Drug use: No       Review of Systems  A comprehensive review of systems was negative except for that written in the HPI.     Objective:     Visit Vitals  /89   Pulse 79   Temp 98 °F (36.7 °C) (Oral)   Resp 18   Ht 5' 4\" (1.626 m)   Wt 247 lb 3.2 oz (112.1 kg)   LMP 12/21/2019 (Exact Date)   SpO2 98%   BMI 42.43 kg/m²        General:  alert, cooperative, no distress, appears stated age   Head:  NCAT w/o lesions or tenderness   Lungs: clear to auscultation bilaterally   Heart:  regular rate and rhythm, S1, S2 normal, no murmur, click, rub or gallop   Neuro: normal without focal findings  mental status, speech normal, alert and oriented x iii  LOUANN  fundi are normal  cranial nerves 2-12 intact   Affect & Behavior:  good grooming, full facial expressions, normal speech pattern and content, normal thought patterns, normal perception, good insight, normal reasoning. Was tearful during exam.             Assessment/ Plan     Diagnoses and all orders for this visit:    1. Annual physical exam  -     METABOLIC PANEL, COMPREHENSIVE  -     TSH 3RD GENERATION  -     CBC W/O DIFF  -     LIPID PANEL  -     HEMOGLOBIN A1C W/O EAG  -     URINALYSIS W/ RFLX MICROSCOPIC  -     HIV 1/2 AG/AB, 4TH GENERATION,W RFLX CONFIRM    2. Essential hypertension  -     METABOLIC PANEL, COMPREHENSIVE  -     TSH 3RD GENERATION    3. Depression, major, recurrent, mild (HCC)  -     METABOLIC PANEL, COMPREHENSIVE  -     TSH 3RD GENERATION    4. Screening for diabetes mellitus  -     HEMOGLOBIN A1C W/O EAG    5. Screening cholesterol level  -     LIPID PANEL    6. Non-compliance    7. Encounter for long-term (current) use of medications  -     METABOLIC PANEL, COMPREHENSIVE  -     TSH 3RD GENERATION  -     CBC W/O DIFF  -     LIPID PANEL  -     HEMOGLOBIN A1C W/O EAG  -     URINALYSIS W/ RFLX MICROSCOPIC  -     HIV 1/2 AG/AB, 4TH GENERATION,W RFLX CONFIRM      Follow-up and Dispositions    · Return in about 2 weeks (around 1/20/2020) for Labs, HTN, meds, weight loss. · Patient Education:  Reviewed concept of anxiety as biochemical imbalance of neurotransmitters and rationale for treatment.  Instructed patient to contact office or on-call physician promptly should condition worsen or any new symptoms appear and provided on-call telephone numbers. IF THE PATIENT HAS ANY SUICIDAL OR HOMICIDAL IDEATION, CALL THE OFFICE, DISCUSS WITH A SUPPORT MEMBER OR GO TO THE ER IMMEDIATELY. Patient was agreeable with this plan.       Betsy Yarbrough MD  1/6/2020

## 2020-01-07 LAB
ALBUMIN SERPL-MCNC: 4.3 G/DL (ref 3.5–5.5)
ALBUMIN/GLOB SERPL: 1.6 {RATIO} (ref 1.2–2.2)
ALP SERPL-CCNC: 97 IU/L (ref 39–117)
ALT SERPL-CCNC: 40 IU/L (ref 0–32)
APPEARANCE UR: CLEAR
AST SERPL-CCNC: 20 IU/L (ref 0–40)
BILIRUB SERPL-MCNC: 0.4 MG/DL (ref 0–1.2)
BILIRUB UR QL STRIP: NEGATIVE
BUN SERPL-MCNC: 10 MG/DL (ref 6–20)
BUN/CREAT SERPL: 13 (ref 9–23)
CALCIUM SERPL-MCNC: 9.6 MG/DL (ref 8.7–10.2)
CHLORIDE SERPL-SCNC: 102 MMOL/L (ref 96–106)
CHOLEST SERPL-MCNC: 192 MG/DL (ref 100–199)
CO2 SERPL-SCNC: 22 MMOL/L (ref 20–29)
COLOR UR: YELLOW
CREAT SERPL-MCNC: 0.78 MG/DL (ref 0.57–1)
ERYTHROCYTE [DISTWIDTH] IN BLOOD BY AUTOMATED COUNT: 13.4 % (ref 11.7–15.4)
GLOBULIN SER CALC-MCNC: 2.7 G/DL (ref 1.5–4.5)
GLUCOSE SERPL-MCNC: 128 MG/DL (ref 65–99)
GLUCOSE UR QL: NEGATIVE
HBA1C MFR BLD: 6.4 % (ref 4.8–5.6)
HCT VFR BLD AUTO: 40.9 % (ref 34–46.6)
HDLC SERPL-MCNC: 51 MG/DL
HGB BLD-MCNC: 13.9 G/DL (ref 11.1–15.9)
HGB UR QL STRIP: NEGATIVE
HIV 1+2 AB+HIV1 P24 AG SERPL QL IA: NON REACTIVE
KETONES UR QL STRIP: NEGATIVE
LDLC SERPL CALC-MCNC: 116 MG/DL (ref 0–99)
LEUKOCYTE ESTERASE UR QL STRIP: NEGATIVE
MCH RBC QN AUTO: 30 PG (ref 26.6–33)
MCHC RBC AUTO-ENTMCNC: 34 G/DL (ref 31.5–35.7)
MCV RBC AUTO: 88 FL (ref 79–97)
MICRO URNS: ABNORMAL
NITRITE UR QL STRIP: NEGATIVE
PH UR STRIP: 5 [PH] (ref 5–7.5)
PLATELET # BLD AUTO: 254 X10E3/UL (ref 150–450)
POTASSIUM SERPL-SCNC: 4.4 MMOL/L (ref 3.5–5.2)
PROT SERPL-MCNC: 7 G/DL (ref 6–8.5)
PROT UR QL STRIP: ABNORMAL
RBC # BLD AUTO: 4.64 X10E6/UL (ref 3.77–5.28)
SODIUM SERPL-SCNC: 140 MMOL/L (ref 134–144)
SP GR UR: >=1.03 (ref 1–1.03)
TRIGL SERPL-MCNC: 124 MG/DL (ref 0–149)
TSH SERPL DL<=0.005 MIU/L-ACNC: 0.97 UIU/ML (ref 0.45–4.5)
UROBILINOGEN UR STRIP-MCNC: 0.2 MG/DL (ref 0.2–1)
VLDLC SERPL CALC-MCNC: 25 MG/DL (ref 5–40)
WBC # BLD AUTO: 6.2 X10E3/UL (ref 3.4–10.8)

## 2020-01-20 ENCOUNTER — OFFICE VISIT (OUTPATIENT)
Dept: FAMILY MEDICINE CLINIC | Age: 35
End: 2020-01-20

## 2020-01-20 VITALS
RESPIRATION RATE: 18 BRPM | DIASTOLIC BLOOD PRESSURE: 84 MMHG | BODY MASS INDEX: 41.73 KG/M2 | HEIGHT: 64 IN | SYSTOLIC BLOOD PRESSURE: 138 MMHG | WEIGHT: 244.4 LBS | HEART RATE: 75 BPM | OXYGEN SATURATION: 98 % | TEMPERATURE: 98.8 F

## 2020-01-20 DIAGNOSIS — N92.6 MISSED PERIOD: ICD-10-CM

## 2020-01-20 DIAGNOSIS — I10 ESSENTIAL HYPERTENSION: ICD-10-CM

## 2020-01-20 DIAGNOSIS — R73.03 PREDIABETES: ICD-10-CM

## 2020-01-20 DIAGNOSIS — Z79.899 ENCOUNTER FOR LONG-TERM (CURRENT) USE OF MEDICATIONS: ICD-10-CM

## 2020-01-20 DIAGNOSIS — Z71.3 WEIGHT LOSS COUNSELING, ENCOUNTER FOR: Primary | ICD-10-CM

## 2020-01-20 DIAGNOSIS — R74.8 ABNORMAL LIVER ENZYMES: ICD-10-CM

## 2020-01-20 LAB
HCG URINE, QL. (POC): NEGATIVE
VALID INTERNAL CONTROL?: YES

## 2020-01-20 RX ORDER — TOPIRAMATE 50 MG/1
25 TABLET, FILM COATED ORAL 2 TIMES DAILY
Qty: 30 TAB | Refills: 1 | Status: SHIPPED | OUTPATIENT
Start: 2020-01-20 | End: 2020-02-25 | Stop reason: SDUPTHER

## 2020-01-20 RX ORDER — NORETHINDRONE 0.35 MG/1
TABLET ORAL
COMMUNITY
Start: 2020-01-15 | End: 2020-01-20 | Stop reason: ALTCHOICE

## 2020-01-20 NOTE — LETTER
1/20/2020 12:45 PM 
 
[facility] [phone] Dear Edgar Reading: 
 
Please find your most recent results below. Resulted Orders METABOLIC PANEL, COMPREHENSIVE (Collected: 1/6/2020 12:11 PM) Result Value Ref Range Glucose 128 (H) 65 - 99 mg/dL BUN 10 6 - 20 mg/dL Creatinine 0.78 0.57 - 1.00 mg/dL GFR est non-AA 99 >59 mL/min/1.73 GFR est  >59 mL/min/1.73  
 BUN/Creatinine ratio 13 9 - 23 Sodium 140 134 - 144 mmol/L Potassium 4.4 3.5 - 5.2 mmol/L Chloride 102 96 - 106 mmol/L  
 CO2 22 20 - 29 mmol/L Calcium 9.6 8.7 - 10.2 mg/dL Protein, total 7.0 6.0 - 8.5 g/dL Albumin 4.3 3.5 - 5.5 g/dL Comment: **Effective January 20, 2020 Albumin reference** 
      interval will be changing to: Age                Male          Female 0 -  7 days        3.6 - 4.9      3.6 - 4.9 
          8 - 30 days        3.4 - 4.7      3.4 - 4.7 
          1 -  6 month       3.7 - 4.8      3.7 - 4.8 
   7 months -  2 years       3.9 - 5.0      3.9 - 5.0 
          3 -  5 years       4.0 - 5.0      4.0 - 5.0 
          6 - 12 years       4.1 - 5.0      4.0 - 5.0 
         13 - 30 years       4.1 - 5.2      3.9 - 5.0 
         31 - 50 years       4.0 - 5.0      3.8 - 4.8 
         51 - 60 years       3.8 - 4.9      3.8 - 4.9 
         61 - 70 years       3.8 - 4.8      3.8 - 4.8 
         71 - 80 years       3.7 - 4.7      3.7 - 4.7 
         81 - 89 years       3.6 - 4.6      3.6 - 4.6 
             >89 years       3.5 - 4.6      3.5 - 4.6 GLOBULIN, TOTAL 2.7 1.5 - 4.5 g/dL A-G Ratio 1.6 1.2 - 2.2 Bilirubin, total 0.4 0.0 - 1.2 mg/dL Alk. phosphatase 97 39 - 117 IU/L  
 AST (SGOT) 20 0 - 40 IU/L  
 ALT (SGPT) 40 (H) 0 - 32 IU/L Narrative Performed at:  [address]  [id number] : Mikael Conte MD, Phone:  [phone] TSH 3RD GENERATION (Collected: 1/6/2020 12:11 PM) Result Value Ref Range TSH 0.975 0.450 - 4.500 uIU/mL Narrative Performed at:  [address]  [id number] : Froilan Goodman MD, Phone:  [phone] CBC W/O DIFF (Collected: 1/6/2020 12:11 PM) Result Value Ref Range WBC 6.2 3.4 - 10.8 x10E3/uL  
 RBC 4.64 3.77 - 5.28 x10E6/uL HGB 13.9 11.1 - 15.9 g/dL HCT 40.9 34.0 - 46.6 % MCV 88 79 - 97 fL  
 MCH 30.0 26.6 - 33.0 pg  
 MCHC 34.0 31.5 - 35.7 g/dL  
 RDW 13.4 11.7 - 15.4 % Comment: **Please note reference interval change** PLATELET 593 849 - 349 x10E3/uL Narrative Performed at:  [address]  [id number] : Froilan Goodman MD, Phone:  [phone] LIPID PANEL (Collected: 1/6/2020 12:11 PM) Result Value Ref Range Cholesterol, total 192 100 - 199 mg/dL Triglyceride 124 0 - 149 mg/dL HDL Cholesterol 51 >39 mg/dL VLDL, calculated 25 5 - 40 mg/dL LDL, calculated 116 (H) 0 - 99 mg/dL Narrative Performed at:  [address]  [id number] : Froilan Goodman MD, Phone:  [phone] HEMOGLOBIN A1C W/O EAG (Collected: 1/6/2020 12:11 PM) Result Value Ref Range Hemoglobin A1c 6.4 (H) 4.8 - 5.6 % Comment:  
            Prediabetes: 5.7 - 6.4 Diabetes: >6.4 Glycemic control for adults with diabetes: <7.0 Narrative Performed at:  [address]  [id number] : Froilan Goodman MD, Phone:  [phone] URINALYSIS W/ RFLX MICROSCOPIC (Collected: 1/6/2020 12:11 PM) Result Value Ref Range Specific Gravity      >=1.030 (A) 1.005 - 1.030  
 pH (UA) 5.0 5.0 - 7.5 Color Yellow Yellow Appearance Clear Clear Leukocyte Esterase Negative Negative Protein Trace Negative/Trace Glucose Negative Negative Ketone Negative Negative Blood Negative Negative Bilirubin Negative Negative Urobilinogen 0.2 0.2 - 1.0 mg/dL Nitrites Negative Negative Microscopic Examination Comment Comment:  
   Microscopic not indicated and not performed. Narrative Performed at:  [address]  [id number] : Thania Diehl MD, Phone:  [phone] HIV 1/2 AG/AB, 4TH GENERATION,W RFLX CONFIRM (Collected: 1/6/2020 12:11 PM) Result Value Ref Range HIV SCREEN 4TH GENERATION WRFX Non Reactive Non Reactive Narrative Performed at:  [address]  [id number] : Thania Diehl MD, Phone:  [phone] Sincerely, Sujatha Hoyt MD

## 2020-01-20 NOTE — PROGRESS NOTES
Chief Complaint   Patient presents with    Hypertension    Results    Medication Evaluation     sees GYN          1. Have you been to the ER, urgent care clinic since your last visit? Hospitalized since your last visit? no    2. Have you seen or consulted any other health care providers outside of the [address] [place] [address] since your last visit? Include any pap smears or colon screening.  no

## 2020-01-20 NOTE — PROGRESS NOTES
Meredith Smith is a 29 y.o. female who presents for     Patient's last menstrual period was 12/21/2019 (approximate). She have irregular period normally. has a past medical history of Asthma, Chronic diarrhea of unknown origin (3/10/2017), Dairy product intolerance (3/10/2017), Depression with anxiety (1/30/2019), Essential hypertension (1/30/2019), Gastroesophageal reflux disease without esophagitis (3/10/2017), History of seasonal allergies, Non-compliance (1/6/2020), Psychotic disorder ([facility] Sciatica, right side (3/10/2017), and Smoker (2/23/2016). She have OBGYN who will manage her birth control. HTN: BP high normal today. Lisinopril not helping but is getting lot of dry cough. Last visit we D/c lisinopril. Start metoprolol 25mg   We'll continue metoprolol    , discussed weight loss, diet    Prediabetes A1C 6.4%    Weight loss: goal 180lbs. 1400cal/day. Discussed nutrition, food choice, drinks, snacks, etc... Start topamax - we discussed spotting and birth control not as effective. Elevated liver enzymes. Drinks EtOH on weekends. Recheck in future    Depression, anxiety  Since then was told having postpartum depression. Report hx of anxiety and depression in the past was treated with Wellbutrin, but never this bad. Mother had depression. Is seeing Therapist Dr. Raul Rodríguez. Denies SI or HI. Not sleeping well. Anxiety, panic attacks. Single, unemployed, staying with mother. Discussed treatment, options, expectation  Last visit we started lexapro 10mg 2 weeks ago. No side effect but she didn't notice any improvement. She's not taking it consistently still have prescription from 04/2019. She just takes it when she feels overwhelmed    GERD: PPI    Smoker: was put on wellbutrin SR, \"doesn't like the way it makes me feel. \"      Allergies   Allergen Reactions    Chantix [Varenicline] Nausea Only     Current Outpatient Medications on File Prior to Visit Medication Sig Dispense Refill    metoprolol succinate (TOPROL-XL) 25 mg XL tablet TAKE 1 TABLET BY MOUTH EVERY DAY 30 Tab 0    escitalopram oxalate (LEXAPRO) 20 mg tablet Take 1 Tab by mouth daily. 90 Tab 0    clonazePAM (KLONOPIN) 0.5 mg tablet Take 1 Tab by mouth nightly as needed (anxiety). Max Daily Amount: 0.5 mg. 20 Tab 0    omeprazole (PRILOSEC) 20 mg capsule Take 1 Cap by mouth daily. 30 Cap 2    norethindrone-ethinyl estradiol (JUNEL FE 1/20, 28,) 1 mg-20 mcg (21)/75 mg (7) tab TK 1 T PO QD       No current facility-administered medications on file prior to visit. Past Medical History:   Diagnosis Date    Asthma     No problems as adult or teenager--last problems as child    Chronic diarrhea of unknown origin 3/10/2017    Dairy product intolerance 3/10/2017    Depression with anxiety 1/30/2019    Essential hypertension 1/30/2019    Gastroesophageal reflux disease without esophagitis 3/10/2017    History of seasonal allergies     Allegra or Claritin--equally effective.  Non-compliance 1/6/2020    Psychotic disorder ([facility]     Sciatica, right side 3/10/2017    Smoker 2/23/2016       Past Surgical History:   Procedure Laterality Date    PAP SMEAR  10/11       Family History   Problem Relation Age of Onset    Diabetes Mother     No Known Problems Father     Hypertension Maternal Aunt     Heart Disease Maternal Grandmother         CAD    Diabetes Maternal Grandmother     Hypertension Maternal Grandmother     No Known Problems Sister        Social History     Tobacco Use    Smoking status: Current Every Day Smoker     Packs/day: 0.50     Years: 20.00     Pack years: 10.00     Types: Cigarettes    Smokeless tobacco: Never Used   Substance Use Topics    Alcohol use: Yes     Comment: 3 pints of burbon weekly    Drug use: No       Review of Systems  A comprehensive review of systems was negative except for that written in the HPI.     Objective:     Visit Vitals  /84 Pulse 75   Temp 98.8 °F (37.1 °C) (Oral)   Resp 18   Ht 5' 4\" (1.626 m)   Wt 244 lb 6.4 oz (110.9 kg)   LMP 12/21/2019 (Approximate)   SpO2 98%   BMI 41.95 kg/m²        General:  alert, cooperative, no distress, appears stated age   Head:  NCAT w/o lesions or tenderness   Lungs: clear to auscultation bilaterally   Heart:  regular rate and rhythm, S1, S2 normal, no murmur, click, rub or gallop   Neuro: normal without focal findings  mental status, speech normal, alert and oriented x iii  LOUANN  fundi are normal  cranial nerves 2-12 intact   Affect & Behavior:  good grooming, full facial expressions, normal speech pattern and content, normal thought patterns, normal perception, good insight, normal reasoning. Was tearful during exam.           Assessment/ Plan     Diagnoses and all orders for this visit:    1. Weight loss counseling, encounter for  -     topiramate (TOPAMAX) 50 mg tablet; Take 1 Tab by mouth two (2) times a day. 2. Prediabetes  -     HEMOGLOBIN A1C W/O EAG    3. Essential hypertension  -     HEPATIC FUNCTION PANEL  -     METABOLIC PANEL, BASIC    4. Abnormal liver enzymes  -     HEPATIC FUNCTION PANEL    5. Missed period  -     AMB POC URINE PREGNANCY TEST, VISUAL COLOR COMPARISON    6. Encounter for long-term (current) use of medications  -     HEMOGLOBIN A1C W/O EAG  -     HEPATIC FUNCTION PANEL  -     METABOLIC PANEL, BASIC      Follow-up and Dispositions    · Return in about 3 months (around 4/20/2020) for weight loss counseling. · Patient Education:  Reviewed concept of anxiety as biochemical imbalance of neurotransmitters and rationale for treatment. Instructed patient to contact office or on-call physician promptly should condition worsen or any new symptoms appear and provided on-call telephone numbers. IF THE PATIENT HAS ANY SUICIDAL OR HOMICIDAL IDEATION, CALL THE OFFICE, DISCUSS WITH A SUPPORT MEMBER OR GO TO THE ER IMMEDIATELY.   Patient was agreeable with this plan.      Bárbara Louise MD  1/20/2020

## 2020-02-07 DIAGNOSIS — I10 ESSENTIAL HYPERTENSION: ICD-10-CM

## 2020-02-07 RX ORDER — METOPROLOL SUCCINATE 25 MG/1
TABLET, EXTENDED RELEASE ORAL
Qty: 90 TAB | Refills: 0 | Status: SHIPPED | OUTPATIENT
Start: 2020-02-07 | End: 2020-05-11

## 2020-02-25 DIAGNOSIS — Z71.3 WEIGHT LOSS COUNSELING, ENCOUNTER FOR: ICD-10-CM

## 2020-02-25 RX ORDER — TOPIRAMATE 50 MG/1
25 TABLET, FILM COATED ORAL 2 TIMES DAILY
Qty: 180 TAB | Refills: 1 | Status: SHIPPED | OUTPATIENT
Start: 2020-02-25 | End: 2021-01-06 | Stop reason: SDUPTHER

## 2020-05-09 DIAGNOSIS — I10 ESSENTIAL HYPERTENSION: ICD-10-CM

## 2020-05-11 RX ORDER — METOPROLOL SUCCINATE 25 MG/1
TABLET, EXTENDED RELEASE ORAL
Qty: 90 TAB | Refills: 0 | Status: SHIPPED | OUTPATIENT
Start: 2020-05-11 | End: 2020-08-05

## 2020-05-22 ENCOUNTER — VIRTUAL VISIT (OUTPATIENT)
Dept: FAMILY MEDICINE CLINIC | Age: 35
End: 2020-05-22

## 2020-05-22 VITALS — WEIGHT: 250 LBS | BODY MASS INDEX: 42.68 KG/M2 | HEIGHT: 64 IN

## 2020-05-22 DIAGNOSIS — Z71.3 WEIGHT LOSS COUNSELING, ENCOUNTER FOR: Primary | ICD-10-CM

## 2020-05-22 DIAGNOSIS — Z79.899 ENCOUNTER FOR LONG-TERM (CURRENT) USE OF MEDICATIONS: ICD-10-CM

## 2020-05-22 DIAGNOSIS — I10 ESSENTIAL HYPERTENSION: ICD-10-CM

## 2020-05-22 DIAGNOSIS — R73.03 PREDIABETES: ICD-10-CM

## 2020-05-22 RX ORDER — NORETHINDRONE 0.35 MG/1
TABLET ORAL
COMMUNITY
Start: 2020-04-11 | End: 2021-01-06 | Stop reason: ALTCHOICE

## 2020-05-22 NOTE — PROGRESS NOTES
Consent: Sabina Vidal, who was seen by synchronous (real-time) audio-video technology, and/or her healthcare decision maker, is aware that this patient-initiated, Telehealth encounter on 5/22/2020 is a billable service, with coverage as determined by her insurance carrier. She is aware that she may receive a bill and has provided verbal consent to proceed: Yes. Sabina Vidal is a 29 y.o. female    No LMP recorded. (Menstrual status: Other (see Comment)). Last visit was 01/2020     has a past medical history of Asthma, Chronic diarrhea of unknown origin (3/10/2017), Dairy product intolerance (3/10/2017), Depression with anxiety (1/30/2019), Essential hypertension (1/30/2019), Gastroesophageal reflux disease without esophagitis (3/10/2017), History of seasonal allergies, Non-compliance (1/6/2020), Psychotic disorder ([facility] Sciatica, right side (3/10/2017), and Smoker (2/23/2016). She have OBGYN who will manage her birth control.       Weight loss: goal 180lbs. 1400cal/day. Discussed nutrition, food choice, drinks, snacks, etc... Last visit we started topamax, but it made her not feel well, have to sleep it off. She wanted to have another child. But with gastric bypass she'll have to wait another year. But then again currently single. She's still deciding wether to go through with it. We again went over weight loss    HTN: BP was high normal last visit. We'll continue metoprolol     , discussed weight loss, diet     Prediabetes A1C 6.4% 1/2020  We'll recheck     Elevated liver enzymes. Drinks EtOH on weekends. Recheck in future     Depression, anxiety  Since then was told having postpartum depression. Report hx of anxiety and depression in the past was treated with Wellbutrin, but never this bad. Mother had depression. Is seeing Therapist Dr. Eric Parisi. Denies SI or HI. Not sleeping well. Anxiety, panic attacks. Single, unemployed, staying with mother.    Discussed treatment, options, expectation  lexapro 10mg. No side effect but she didn't notice any improvement. She's not taking it consistently still have prescription from 04/2019. She just takes it when she feels overwhelmed     GERD: PPI     Smoker: was put on wellbutrin SR, \"doesn't like the way it makes me feel. \"       Reviewed: active problem list, medication list, allergies, notes from last encounter, lab results    A comprehensive review of systems was negative except for that written in the HPI. Assessment & Plan:   Diagnoses and all orders for this visit:    1. Weight loss counseling, encounter for    2. Essential hypertension  -     METABOLIC PANEL, BASIC  -     HEPATIC FUNCTION PANEL    3. Prediabetes  -     HEMOGLOBIN A1C W/O EAG  -     METABOLIC PANEL, BASIC  -     HEPATIC FUNCTION PANEL    4. Encounter for long-term (current) use of medications  -     HEMOGLOBIN A1C W/O EAG  -     METABOLIC PANEL, BASIC  -     HEPATIC FUNCTION PANEL        Follow-up and Dispositions    · Return in about 3 months (around 8/22/2020) for borderline DM, HTN, sooner if labs abnormal.           712  Subjective:   Edi Nails is a 29 y.o. female who was seen for Weight Management (stopped topamax)      Prior to Admission medications    Medication Sig Start Date End Date Taking? Authorizing Provider   Incassia 0.35 mg tab TAKE 1 TABLET BY MOUTH EVERY DAY 4/11/20  Yes Provider, Historical   metoprolol succinate (TOPROL-XL) 25 mg XL tablet TAKE 1 TABLET BY MOUTH EVERY DAY 5/11/20  Yes Shamar Staley MD   escitalopram oxalate (LEXAPRO) 20 mg tablet Take 1 Tab by mouth daily. 4/8/19  Yes Elvis Talley MD   omeprazole (PRILOSEC) 20 mg capsule Take 1 Cap by mouth daily. 3/10/17  Yes Elvis Talley MD   topiramate (TOPAMAX) 50 mg tablet Take 1 Tab by mouth two (2) times a day. 2/25/20   Elvis Talley MD   clonazePAM (KLONOPIN) 0.5 mg tablet Take 1 Tab by mouth nightly as needed (anxiety).  Max Daily Amount: 0.5 mg. 2/27/19 Loreta Chandler MD     Allergies   Allergen Reactions    Chantix [Varenicline] Nausea Only    Lisinopril Cough             Objective:   Vital Signs: (As obtained by patient/caregiver at home)  Visit Vitals  Ht 5' 4\" (1.626 m)   Wt 250 lb (113.4 kg)   BMI 42.91 kg/m²        Constitutional: [x] Appears well-developed and well-nourished [x] No apparent distress      [] Abnormal -     Mental status: [x] Alert and awake  [x] Oriented to person/place/time [x] Able to follow commands    [] Abnormal -     Eyes:   EOM    [x]  Normal    [] Abnormal -   Sclera  [x]  Normal    [] Abnormal -          Discharge [x]  None visible   [] Abnormal -     HENT: [x] Normocephalic, atraumatic  [] Abnormal -   [] Mouth/Throat: Mucous membranes are moist    External Ears [x] Normal  [] Abnormal -    Neck: [x] No visualized mass [] Abnormal -     Pulmonary/Chest: [x] Respiratory effort normal   [x] No visualized signs of difficulty breathing or respiratory distress        [] Abnormal -      Musculoskeletal:   [x] Normal gait with no signs of ataxia         [x] Normal range of motion of neck        [] Abnormal -     Neurological:        [x] No Facial Asymmetry (Cranial nerve 7 motor function) (limited exam due to video visit)          [x] No gaze palsy        [] Abnormal -          Skin:        [x] No significant exanthematous lesions or discoloration noted on facial skin         [] Abnormal -            Psychiatric:       [x] Normal Affect [] Abnormal -        [x] No Hallucinations      We discussed the expected course, resolution and complications of the diagnosis(es) in detail. Medication risks, benefits, costs, interactions, and alternatives were discussed as indicated. I advised her to contact the office if her condition worsens, changes or fails to improve as anticipated. She expressed understanding with the diagnosis(es) and plan.        Mariya Montenegro is a 29 y.o. female being evaluated by a video visit encounter for concerns as above.  A caregiver was present when appropriate. Due to this being a TeleHealth encounter (During LNGSB-95 public health emergency), evaluation of the following organ systems was limited: Vitals/Constitutional/EENT/Resp/CV/GI//MS/Neuro/Skin/Heme-Lymph-Imm. Pursuant to the emergency declaration under the [address] [place] [address] waiver authority and the [employer] and [employer] Act, this Virtual  Visit was conducted, with patient's (and/or legal guardian's) consent, to reduce the patient's risk of exposure to COVID-19 and provide necessary medical care. Services were provided through a video synchronous discussion virtually to substitute for in-person clinic visit. Patient and provider were located at their individual homes.         Isabel Johnson MD

## 2020-05-22 NOTE — PROGRESS NOTES
Chief Complaint   Patient presents with    Weight Management     stopped topamax       1. Have you been to the ER, urgent care clinic since your last visit? Hospitalized since your last visit? no    2. Have you seen or consulted any other health care providers outside of the [address] [place] [address] since your last visit? Include any pap smears or colon screening.  yes

## 2020-08-05 DIAGNOSIS — I10 ESSENTIAL HYPERTENSION: ICD-10-CM

## 2020-08-05 RX ORDER — METOPROLOL SUCCINATE 25 MG/1
TABLET, EXTENDED RELEASE ORAL
Qty: 90 TAB | Refills: 0 | Status: SHIPPED | OUTPATIENT
Start: 2020-08-05 | End: 2020-11-02

## 2020-09-28 DIAGNOSIS — F41.8 DEPRESSION WITH ANXIETY: ICD-10-CM

## 2020-09-28 RX ORDER — ESCITALOPRAM OXALATE 20 MG/1
TABLET ORAL
Qty: 90 TAB | Refills: 0 | Status: SHIPPED | OUTPATIENT
Start: 2020-09-28 | End: 2021-01-06 | Stop reason: SDUPTHER

## 2020-09-28 NOTE — TELEPHONE ENCOUNTER
Pt Last seen >4 months ago  Med refill 1 month until f/u  pls call for F/u appointment    thx    Austin Spencer MD  4/3/2018

## 2020-10-31 DIAGNOSIS — I10 ESSENTIAL HYPERTENSION: ICD-10-CM

## 2020-11-02 RX ORDER — METOPROLOL SUCCINATE 25 MG/1
TABLET, EXTENDED RELEASE ORAL
Qty: 30 TAB | Refills: 0 | Status: SHIPPED | OUTPATIENT
Start: 2020-11-02 | End: 2020-12-02

## 2020-11-03 NOTE — TELEPHONE ENCOUNTER
Pt Last seen >6 months ago  Med refill 1 month until f/u  pls call for F/u appointment    thx    Ambar Rudolph MD  4/3/2018

## 2020-11-28 DIAGNOSIS — I10 ESSENTIAL HYPERTENSION: ICD-10-CM

## 2020-12-02 RX ORDER — METOPROLOL SUCCINATE 25 MG/1
TABLET, EXTENDED RELEASE ORAL
Qty: 30 TAB | Refills: 0 | Status: SHIPPED | OUTPATIENT
Start: 2020-12-02 | End: 2021-01-04 | Stop reason: SDUPTHER

## 2021-01-04 DIAGNOSIS — I10 ESSENTIAL HYPERTENSION: ICD-10-CM

## 2021-01-04 RX ORDER — METOPROLOL SUCCINATE 25 MG/1
TABLET, EXTENDED RELEASE ORAL
Qty: 30 TAB | Refills: 0 | Status: SHIPPED | OUTPATIENT
Start: 2021-01-04 | End: 2021-01-06 | Stop reason: SDUPTHER

## 2021-01-04 NOTE — TELEPHONE ENCOUNTER
----- Message from Junior Patterson sent at 1/4/2021  1:36 PM EST -----  Regarding: Dr. Polo Humphries Message/Vendor Calls    Caller's first and last name: Pt      Reason for call: Pt stated that her [facility] pharmacy on file, has been trying to contact the office because they cant refill the pt's bp meds.       Callback required yes/no and why: yes      Best contact number(s):[phone]      Details to clarify the request:N/A      Junior Patterson

## 2021-01-06 ENCOUNTER — VIRTUAL VISIT (OUTPATIENT)
Dept: FAMILY MEDICINE CLINIC | Age: 36
End: 2021-01-06
Payer: COMMERCIAL

## 2021-01-06 DIAGNOSIS — Z79.899 ENCOUNTER FOR LONG-TERM (CURRENT) USE OF MEDICATIONS: ICD-10-CM

## 2021-01-06 DIAGNOSIS — Z13.220 SCREENING CHOLESTEROL LEVEL: ICD-10-CM

## 2021-01-06 DIAGNOSIS — R73.03 PREDIABETES: ICD-10-CM

## 2021-01-06 DIAGNOSIS — I10 ESSENTIAL HYPERTENSION: ICD-10-CM

## 2021-01-06 DIAGNOSIS — Z00.00 ANNUAL PHYSICAL EXAM: Primary | ICD-10-CM

## 2021-01-06 DIAGNOSIS — F41.8 DEPRESSION WITH ANXIETY: ICD-10-CM

## 2021-01-06 DIAGNOSIS — Z91.199 NON-COMPLIANCE: ICD-10-CM

## 2021-01-06 DIAGNOSIS — Z71.3 WEIGHT LOSS COUNSELING, ENCOUNTER FOR: ICD-10-CM

## 2021-01-06 PROCEDURE — 99395 PREV VISIT EST AGE 18-39: CPT | Performed by: FAMILY MEDICINE

## 2021-01-06 PROCEDURE — 99212 OFFICE O/P EST SF 10 MIN: CPT | Performed by: FAMILY MEDICINE

## 2021-01-06 RX ORDER — METOPROLOL SUCCINATE 25 MG/1
TABLET, EXTENDED RELEASE ORAL
Qty: 90 TAB | Refills: 0 | Status: CANCELLED | OUTPATIENT
Start: 2021-01-06

## 2021-01-06 RX ORDER — ESCITALOPRAM OXALATE 20 MG/1
TABLET ORAL
Qty: 90 TAB | Refills: 0 | Status: SHIPPED | OUTPATIENT
Start: 2021-01-06 | End: 2021-06-30

## 2021-01-06 RX ORDER — TOPIRAMATE 50 MG/1
25 TABLET, FILM COATED ORAL 2 TIMES DAILY
Qty: 180 TAB | Refills: 0 | Status: SHIPPED | OUTPATIENT
Start: 2021-01-06 | End: 2021-03-29 | Stop reason: ALTCHOICE

## 2021-01-06 RX ORDER — METOPROLOL SUCCINATE 25 MG/1
TABLET, EXTENDED RELEASE ORAL
Qty: 90 TAB | Refills: 0 | Status: SHIPPED | OUTPATIENT
Start: 2021-01-06 | End: 2021-03-29 | Stop reason: SDUPTHER

## 2021-01-06 RX ORDER — CLONAZEPAM 0.5 MG/1
0.5 TABLET ORAL
Qty: 20 TAB | Refills: 0 | Status: SHIPPED | OUTPATIENT
Start: 2021-01-06 | End: 2022-02-18 | Stop reason: SDUPTHER

## 2021-01-06 NOTE — PROGRESS NOTES
Chief Complaint   Patient presents with    Hypertension     Check meds    1. Have you been to the ER, urgent care clinic since your last visit? Hospitalized since your last visit? Yes [facility]    2. Have you seen or consulted any other health care providers outside of the [address] [place] [address] since your last visit? Include any pap smears or colon screening.  no

## 2021-01-06 NOTE — PROGRESS NOTES
Consent: Carmenza Mcdermott, who was seen by synchronous (real-time) audio-video technology, and/or her healthcare decision maker, is aware that this patient-initiated, Telehealth encounter on 1/6/2021 is a billable service, with coverage as determined by her insurance carrier. She is aware that she may receive a bill and has provided verbal consent to proceed: Yes. Carmenza Mcdermott is a 28 y.o. female    No LMP recorded. (Menstrual status: Other (see Comment)). Due for annual exam    Last visit was 05/2020, 8 months ago, did nto f/u as recommended. I had given her labs in 01/2020 and 05/2020 she haven't done neither. She's been out of some of her meds or haven't taken them regularly  Pt with his of noncompliant. Annual exam      has a past medical history of Asthma, Chronic diarrhea of unknown origin (3/10/2017), Dairy product intolerance (3/10/2017), Depression with anxiety (1/30/2019), Essential hypertension (1/30/2019), Gastroesophageal reflux disease without esophagitis (3/10/2017), History of seasonal allergies, Non-compliance (1/6/2020), Psychotic disorder ([facility] [address] Sciatica, right side (3/10/2017), and Smoker (2/23/2016). She have OBGYN   No longer on birth control. Not sexually active.       HTN: BP was high normal last visit. We'll continue metoprolol     , discussed weight loss, diet     Prediabetes A1C 6.4% 1/2020  We'll recheck     Depression, anxiety  Since then was told having postpartum depression. Report hx of anxiety and depression in the past was treated with Wellbutrin, but never this bad. Mother had depression. Is seeing Therapist Dr. Rosalind Perez. Denies SI or HI. Not sleeping well. Anxiety, panic attacks. Single, unemployed, staying with mother. Discussed treatment, options, expectation  lexapro 10mg. No side effect but she didn't notice any improvement. She's not taking it consistently still have prescription from 04/2019.  She just takes it when she feels overwhelmed     GERD: PPI     Smoker: was put on wellbutrin SR, \"doesn't like the way it makes me feel. \"     Weight loss: goal 180lbs. 1400cal/day. Discussed nutrition, food choice, drinks, snacks, etc... Last visit we started topamax, but it made her not feel well, have to sleep it off. She wanted to have another child. But with gastric bypass she'll have to wait another year. But then again currently single. She's still deciding wether to go through with it. We again went over weight loss      Reviewed: active problem list, medication list, allergies, notes from last encounter, lab results    A comprehensive review of systems was negative except for that written in the HPI. Assessment & Plan:   Diagnoses and all orders for this visit:    1. Annual physical exam  -     CBC W/O DIFF  -     HEMOGLOBIN A1C W/O EAG  -     LIPID PANEL  -     METABOLIC PANEL, COMPREHENSIVE  -     TSH 3RD GENERATION  -     URINALYSIS W/ RFLX MICROSCOPIC    2. Essential hypertension  -     metoprolol succinate (TOPROL-XL) 25 mg XL tablet; TAKE 1 TABLET BY MOUTH EVERY DAY  -     METABOLIC PANEL, COMPREHENSIVE  -     TSH 3RD GENERATION    3. Depression with anxiety  -     escitalopram oxalate (LEXAPRO) 20 mg tablet; TAKE 1 TABLET BY MOUTH EVERY DAY  -     clonazePAM (KlonoPIN) 0.5 mg tablet; Take 1 Tab by mouth nightly as needed (anxiety). Max Daily Amount: 0.5 mg.  -     METABOLIC PANEL, COMPREHENSIVE  -     TSH 3RD GENERATION    4. Prediabetes  -     HEMOGLOBIN A1C W/O EAG    5. Weight loss counseling, encounter for  -     topiramate (TOPAMAX) 50 mg tablet; Take 1 Tab by mouth two (2) times a day. 6. Non-compliance    7. Encounter for long-term (current) use of medications  -     CBC W/O DIFF  -     HEMOGLOBIN A1C W/O EAG  -     LIPID PANEL  -     METABOLIC PANEL, COMPREHENSIVE  -     TSH 3RD GENERATION  -     URINALYSIS W/ RFLX MICROSCOPIC    8.  Screening cholesterol level  -     LIPID PANEL      Follow-up and Dispositions    · Return in about 1 month (around 2/6/2021) for HTN, prediabetes, HLD, depression, meds, labs. 712  Subjective:   Lyric Hutchinson is a 28 y.o. female who was seen for Hypertension      Prior to Admission medications    Medication Sig Start Date End Date Taking? Authorizing Provider   metoprolol succinate (TOPROL-XL) 25 mg XL tablet TAKE 1 TABLET BY MOUTH EVERY DAY 1/6/21  Yes Joceline Staley MD   escitalopram oxalate (LEXAPRO) 20 mg tablet TAKE 1 TABLET BY MOUTH EVERY DAY 1/6/21  Yes Joceline Staley MD   topiramate (TOPAMAX) 50 mg tablet Take 1 Tab by mouth two (2) times a day. 1/6/21  Yes Michael Humphrey MD   clonazePAM (KlonoPIN) 0.5 mg tablet Take 1 Tab by mouth nightly as needed (anxiety). Max Daily Amount: 0.5 mg. 1/6/21  Yes Joceline Staley MD   omeprazole (PRILOSEC) 20 mg capsule Take 1 Cap by mouth daily. 3/10/17  Yes Michael Humphrey MD     Allergies   Allergen Reactions    Chantix [Varenicline] Nausea Only    Lisinopril Cough       Objective:   Vital Signs: (As obtained by patient/caregiver at home)  There were no vitals taken for this visit.      Constitutional: [x] Appears well-developed and well-nourished [x] No apparent distress      [] Abnormal -     Mental status: [x] Alert and awake  [x] Oriented to person/place/time [x] Able to follow commands    [] Abnormal -     Eyes:   EOM    [x]  Normal    [] Abnormal -   Sclera  [x]  Normal    [] Abnormal -          Discharge [x]  None visible   [] Abnormal -     HENT: [x] Normocephalic, atraumatic  [] Abnormal -   [] Mouth/Throat: Mucous membranes are moist    External Ears [x] Normal  [] Abnormal -    Neck: [x] No visualized mass [] Abnormal -     Pulmonary/Chest: [x] Respiratory effort normal   [x] No visualized signs of difficulty breathing or respiratory distress        [] Abnormal -      Musculoskeletal:   [x] Normal gait with no signs of ataxia         [x] Normal range of motion of neck        [] Abnormal -     Neurological:        [x] No Facial Asymmetry (Cranial nerve 7 motor function) (limited exam due to video visit)          [x] No gaze palsy        [] Abnormal -          Skin:        [x] No significant exanthematous lesions or discoloration noted on facial skin         [] Abnormal -            Psychiatric:       [x] Normal Affect [] Abnormal -        [x] No Hallucinations      We discussed the expected course, resolution and complications of the diagnosis(es) in detail. Medication risks, benefits, costs, interactions, and alternatives were discussed as indicated. I advised her to contact the office if her condition worsens, changes or fails to improve as anticipated. She expressed understanding with the diagnosis(es) and plan. Damian Gilliland is a 28 y.o. female being evaluated by a video visit encounter for concerns as above. A caregiver was present when appropriate. Due to this being a TeleHealth encounter (During [facility] public health emergency), evaluation of the following organ systems was limited: Vitals/Constitutional/EENT/Resp/CV/GI//MS/Neuro/Skin/Heme-Lymph-Imm. Pursuant to the emergency declaration under the [facility]5 waiver authority and the [email or web address] General Act, this Virtual  Visit was conducted, with patient's (and/or legal guardian's) consent, to reduce the patient's risk of exposure to COVID-19 and provide necessary medical care. Services were provided through a video synchronous discussion virtually to substitute for in-person clinic visit. Patient and provider were located at their individual homes.         Belkys An MD

## 2021-02-02 LAB
ALBUMIN SERPL-MCNC: 4.4 G/DL (ref 3.8–4.8)
ALBUMIN/GLOB SERPL: 1.7 {RATIO} (ref 1.2–2.2)
ALP SERPL-CCNC: 96 IU/L (ref 39–117)
ALT SERPL-CCNC: 73 IU/L (ref 0–32)
APPEARANCE UR: CLEAR
AST SERPL-CCNC: 33 IU/L (ref 0–40)
BILIRUB SERPL-MCNC: 0.4 MG/DL (ref 0–1.2)
BILIRUB UR QL STRIP: NEGATIVE
BUN SERPL-MCNC: 7 MG/DL (ref 6–20)
BUN/CREAT SERPL: 10 (ref 9–23)
CALCIUM SERPL-MCNC: 9.8 MG/DL (ref 8.7–10.2)
CHLORIDE SERPL-SCNC: 104 MMOL/L (ref 96–106)
CHOLEST SERPL-MCNC: 175 MG/DL (ref 100–199)
CO2 SERPL-SCNC: 22 MMOL/L (ref 20–29)
COLOR UR: YELLOW
CREAT SERPL-MCNC: 0.7 MG/DL (ref 0.57–1)
ERYTHROCYTE [DISTWIDTH] IN BLOOD BY AUTOMATED COUNT: 12.2 % (ref 11.7–15.4)
GLOBULIN SER CALC-MCNC: 2.6 G/DL (ref 1.5–4.5)
GLUCOSE SERPL-MCNC: 150 MG/DL (ref 65–99)
GLUCOSE UR QL: ABNORMAL
HBA1C MFR BLD: 6.6 % (ref 4.8–5.6)
HCT VFR BLD AUTO: 40.2 % (ref 34–46.6)
HDLC SERPL-MCNC: 46 MG/DL
HGB BLD-MCNC: 13.9 G/DL (ref 11.1–15.9)
HGB UR QL STRIP: NEGATIVE
KETONES UR QL STRIP: ABNORMAL
LDLC SERPL CALC-MCNC: 103 MG/DL (ref 0–99)
LEUKOCYTE ESTERASE UR QL STRIP: NEGATIVE
MCH RBC QN AUTO: 31.3 PG (ref 26.6–33)
MCHC RBC AUTO-ENTMCNC: 34.6 G/DL (ref 31.5–35.7)
MCV RBC AUTO: 91 FL (ref 79–97)
MICRO URNS: ABNORMAL
NITRITE UR QL STRIP: NEGATIVE
PH UR STRIP: 5.5 [PH] (ref 5–7.5)
PLATELET # BLD AUTO: 278 X10E3/UL (ref 150–450)
POTASSIUM SERPL-SCNC: 4.3 MMOL/L (ref 3.5–5.2)
PROT SERPL-MCNC: 7 G/DL (ref 6–8.5)
PROT UR QL STRIP: NEGATIVE
RBC # BLD AUTO: 4.44 X10E6/UL (ref 3.77–5.28)
SODIUM SERPL-SCNC: 140 MMOL/L (ref 134–144)
SP GR UR: >=1.03 (ref 1–1.03)
TRIGL SERPL-MCNC: 149 MG/DL (ref 0–149)
TSH SERPL DL<=0.005 MIU/L-ACNC: 1.51 UIU/ML (ref 0.45–4.5)
UROBILINOGEN UR STRIP-MCNC: 1 MG/DL (ref 0.2–1)
VLDLC SERPL CALC-MCNC: 26 MG/DL (ref 5–40)
WBC # BLD AUTO: 7.6 X10E3/UL (ref 3.4–10.8)

## 2021-03-29 ENCOUNTER — OFFICE VISIT (OUTPATIENT)
Dept: FAMILY MEDICINE CLINIC | Age: 36
End: 2021-03-29
Payer: COMMERCIAL

## 2021-03-29 VITALS
TEMPERATURE: 97.5 F | RESPIRATION RATE: 16 BRPM | WEIGHT: 242.8 LBS | SYSTOLIC BLOOD PRESSURE: 141 MMHG | HEIGHT: 64 IN | DIASTOLIC BLOOD PRESSURE: 95 MMHG | BODY MASS INDEX: 41.45 KG/M2 | OXYGEN SATURATION: 96 %

## 2021-03-29 DIAGNOSIS — E11.9 NEW ONSET TYPE 2 DIABETES MELLITUS (HCC): Primary | ICD-10-CM

## 2021-03-29 DIAGNOSIS — Z71.3 WEIGHT LOSS COUNSELING, ENCOUNTER FOR: ICD-10-CM

## 2021-03-29 DIAGNOSIS — Z79.899 ENCOUNTER FOR LONG-TERM (CURRENT) USE OF MEDICATIONS: ICD-10-CM

## 2021-03-29 DIAGNOSIS — I10 ESSENTIAL HYPERTENSION: ICD-10-CM

## 2021-03-29 DIAGNOSIS — F41.8 DEPRESSION WITH ANXIETY: ICD-10-CM

## 2021-03-29 PROCEDURE — 99215 OFFICE O/P EST HI 40 MIN: CPT | Performed by: FAMILY MEDICINE

## 2021-03-29 RX ORDER — METOPROLOL SUCCINATE 25 MG/1
TABLET, EXTENDED RELEASE ORAL
Qty: 90 TAB | Refills: 0 | Status: SHIPPED | OUTPATIENT
Start: 2021-03-29 | End: 2021-07-02

## 2021-03-29 RX ORDER — METFORMIN HYDROCHLORIDE 500 MG/1
500 TABLET ORAL 2 TIMES DAILY WITH MEALS
Qty: 60 TAB | Refills: 1 | Status: SHIPPED | OUTPATIENT
Start: 2021-03-29 | End: 2021-04-20

## 2021-03-29 RX ORDER — HYDROCHLOROTHIAZIDE 12.5 MG/1
12.5 TABLET ORAL DAILY
Qty: 30 TAB | Refills: 1 | Status: SHIPPED | OUTPATIENT
Start: 2021-03-29 | End: 2021-04-20

## 2021-03-29 NOTE — PROGRESS NOTES
Chief Complaint   Patient presents with    Hypertension    Cholesterol Problem    Depression         1. Have you been to the ER, urgent care clinic since your last visit? Hospitalized since your last visit? No    2. Have you seen or consulted any other health care providers outside of the [address] [place] [address] since your last visit? Include any pap smears or colon screening.  No

## 2021-03-29 NOTE — PROGRESS NOTES
Candice Kumari is a 28 y.o. female     has a past medical history of Asthma, Chronic diarrhea of unknown origin (3/10/2017), Dairy product intolerance (3/10/2017), Depression with anxiety (1/30/2019), Essential hypertension (1/30/2019), Gastroesophageal reflux disease without esophagitis (3/10/2017), History of seasonal allergies, Non-compliance (1/6/2020), Psychotic disorder ([facility] [address] Sciatica, right side (3/10/2017), and Smoker (2/23/2016). She have OBGYN   No longer on birth control. Not sexually active.       HTN: BP is high today and have been borderline past few visits. metoprolol  Add hctz 12.5mg     , discussed weight loss, diet     Diabetes A1C 6.6%, 6.4% 1/2020  Education, diet, exercise. Risk, complication, treatment. Start metformin 500mg BID     LFT slight up but she drank    Depression, anxiety  She got a job virtually, moved out of her mother, not in a toxic relationship. Have been doing well emotionally. Sleep is better  Mother had depression. Is seeing Therapist Dr. Libby Evans. Denies SI or HI. Lexapro 20mg daily  Clonazepam prn last wrote #20 01/06/2021, she still have left over.      GERD: PPI     Smoker: was put on wellbutrin SR, \"doesn't like the way it makes me feel. \"     Weight loss counseling: goal 180lbs. Decided not to go with gastric bypass  1400cal/day. Discussed nutrition, food choice, drinks, snacks, etc... We again went over weight loss  Previously we tried topamax she didn't want to take it says on too many already       Reviewed: active problem list, medication list, allergies, notes from last encounter, lab results    A comprehensive review of systems was negative except for that written in the HPI.       Allergies   Allergen Reactions    Chantix [Varenicline] Nausea Only    Lisinopril Cough     Current Outpatient Medications on File Prior to Visit   Medication Sig Dispense Refill    escitalopram oxalate (LEXAPRO) 20 mg tablet TAKE 1 TABLET BY MOUTH EVERY DAY 90 Tab 0    clonazePAM (KlonoPIN) 0.5 mg tablet Take 1 Tab by mouth nightly as needed (anxiety). Max Daily Amount: 0.5 mg. 20 Tab 0    omeprazole (PRILOSEC) 20 mg capsule Take 1 Cap by mouth daily. 30 Cap 2     No current facility-administered medications on file prior to visit. Patient Active Problem List   Diagnosis Code    Migraine G43.909    Allergic rhinitis--worse with allergies J30.9    Smoker F17.200    Gastroesophageal reflux disease without esophagitis K21.9    Sciatica, right side M54.31    Dairy product intolerance K90.9    Chronic diarrhea of unknown origin K52.9    Essential hypertension I10    Depression with anxiety F41.8    Severe obesity (HCC) E66.01    Depression, major, recurrent, mild (HCC) F33.0    Non-compliance Z91.19       Visit Vitals  BP (!) 141/95   Temp 97.5 °F (36.4 °C) (Temporal)   Resp 16   Ht 5' 4\" (1.626 m)   Wt 242 lb 12.8 oz (110.1 kg)   SpO2 96%   BMI 41.68 kg/m²     General appearance: alert, cooperative, no distress, appears stated age  Neurologic: Alert and oriented X 3, normal strength and tone, symmetric. Normal without focal findings. Cranial nerves 2-12 intact. Normal coordination and gait. Mental status: Alert, oriented, thought content appropriate, affect: stable, mood-congruent. Head: Normocephalic, without obvious abnormality, atraumatic  Eyes: conjunctivae/corneas clear. PERRL, EOM's intact. Neck: supple, symmetrical, trachea midline, no JVD  Lungs: clear to auscultation bilaterally  Heart: regular rate and rhythm, S1, S2 normal, no murmur, click, rub or gallop  Abdomen: soft, non-tender. Extremities: extremities normal, atraumatic, no cyanosis or edema      Assessment/Plans:    Diagnoses and all orders for this visit:    1. New onset type 2 diabetes mellitus (HCC)  -     metFORMIN (GLUCOPHAGE) 500 mg tablet; Take 1 Tab by mouth two (2) times daily (with meals).     2. Essential hypertension  -     metoprolol succinate (TOPROL-XL) 25 mg XL tablet; TAKE 1 TABLET BY MOUTH EVERY DAY  -     hydroCHLOROthiazide (HYDRODIURIL) 12.5 mg tablet; Take 1 Tab by mouth daily. 3. Depression with anxiety    4. Weight loss counseling, encounter for    5. Encounter for long-term (current) use of medications      Discussed plans, risk/benefits of treatments/observations. Through the use of shared decision making, above plans were agreed upon. Medication compliance advised. Patient verbalized understanding. Follow-up and Dispositions    · Return for HTN, diabetes, new meds.          Dierdre Brunner, MD  3/29/2021

## 2021-04-20 DIAGNOSIS — I10 ESSENTIAL HYPERTENSION: ICD-10-CM

## 2021-04-20 DIAGNOSIS — E11.9 NEW ONSET TYPE 2 DIABETES MELLITUS (HCC): ICD-10-CM

## 2021-04-20 RX ORDER — METFORMIN HYDROCHLORIDE 500 MG/1
TABLET ORAL
Qty: 60 TAB | Refills: 1 | Status: SHIPPED | OUTPATIENT
Start: 2021-04-20 | End: 2021-04-29 | Stop reason: SINTOL

## 2021-04-20 RX ORDER — HYDROCHLOROTHIAZIDE 12.5 MG/1
TABLET ORAL
Qty: 30 TAB | Refills: 1 | Status: SHIPPED | OUTPATIENT
Start: 2021-04-20 | End: 2021-04-29 | Stop reason: SDUPTHER

## 2021-04-29 ENCOUNTER — VIRTUAL VISIT (OUTPATIENT)
Dept: FAMILY MEDICINE CLINIC | Age: 36
End: 2021-04-29
Payer: COMMERCIAL

## 2021-04-29 DIAGNOSIS — F41.8 DEPRESSION WITH ANXIETY: ICD-10-CM

## 2021-04-29 DIAGNOSIS — I10 ESSENTIAL HYPERTENSION: ICD-10-CM

## 2021-04-29 DIAGNOSIS — E11.9 NEW ONSET TYPE 2 DIABETES MELLITUS (HCC): Primary | ICD-10-CM

## 2021-04-29 DIAGNOSIS — Z79.899 ENCOUNTER FOR LONG-TERM (CURRENT) USE OF MEDICATIONS: ICD-10-CM

## 2021-04-29 PROCEDURE — 99443 PR PHYS/QHP TELEPHONE EVALUATION 21-30 MIN: CPT | Performed by: FAMILY MEDICINE

## 2021-04-29 RX ORDER — HYDROCHLOROTHIAZIDE 12.5 MG/1
TABLET ORAL
Qty: 90 TAB | Refills: 1 | Status: SHIPPED | OUTPATIENT
Start: 2021-04-29 | End: 2021-09-30 | Stop reason: SDUPTHER

## 2021-04-29 RX ORDER — METFORMIN HYDROCHLORIDE 500 MG/1
500 TABLET, EXTENDED RELEASE ORAL
Qty: 90 TAB | Refills: 1 | Status: SHIPPED | OUTPATIENT
Start: 2021-04-29 | End: 2021-07-16 | Stop reason: SDUPTHER

## 2021-04-29 NOTE — PROGRESS NOTES
Chief Complaint   Patient presents with    Diabetes    Hypertension     4 mo check    1. Have you been to the ER, urgent care clinic since your last visit? Hospitalized since your last visit? No     2. Have you seen or consulted any other health care providers outside of the [address] [place] [address] since your last visit? Include any pap smears or colon screening.  No

## 2021-04-29 NOTE — PROGRESS NOTES
Doc Florence is a 28 y.o. female evaluated via telephone on 4/29/2021. Consent:  She and/or health care decision maker is aware that she may receive a bill for this telephone service, depending on her insurance coverage, and has provided verbal consent to proceed: Yes      Documentation:  I communicated with the patient and/or health care decision maker about;   Details of this discussion including any medical advice provided:      has a past medical history of Asthma, Chronic diarrhea of unknown origin (3/10/2017), Dairy product intolerance (3/10/2017), Depression with anxiety (1/30/2019), Essential hypertension (1/30/2019), Gastroesophageal reflux disease without esophagitis (3/10/2017), History of seasonal allergies, Non-compliance (1/6/2020), [facility] Sciatica, right side (3/10/2017), and Smoker (2/23/2016).     HTN: BP is good at home today. metoprolol  Last visit we started hctz 12.5mg  Continue course    , discussed weight loss, diet     Diabetes A1C 6.6%, 6.4% 1/2020  Education, diet, exercise. Risk, complication, treatment. Last visit we started metformin 500mg BID, only tolerating once a day. Nausea  Change to Metformin 500mg ER     LFT slight up but she drank     Depression, anxiety  She got a job virtually, moved out of her mother, not in a toxic relationship. Have been doing well emotionally. Sleep is better  Mother had depression. Is seeing Therapist Dr. Carlos Khanna. Denies SI or HI. Lexapro 20mg daily  Clonazepam prn last wrote #20 01/06/2021, she still have left over.      GERD: PPI     Smoker: was put on wellbutrin SR, \"doesn't like the way it makes me feel. \"     Weight loss counseling: goal 180lbs. Decided not to go with gastric bypass  1400cal/day. Discussed nutrition, food choice, drinks, snacks, etc...   We again went over weight loss  Previously we tried topamax she didn't want to take it says on too many already    She have OBGYN   No longer on birth control. Not sexually active.       Diagnoses and all orders for this visit:    1. New onset type 2 diabetes mellitus (HCC)  -     metFORMIN ER (GLUCOPHAGE XR) 500 mg tablet; Take 1 Tab by mouth daily (with dinner). -     METABOLIC PANEL, COMPREHENSIVE; Future  -     MICROALBUMIN, UR, RAND W/ MICROALB/CREAT RATIO; Future  -     HEMOGLOBIN A1C WITH EAG; Future    2. Essential hypertension  -     METABOLIC PANEL, COMPREHENSIVE; Future  -     hydroCHLOROthiazide (HYDRODIURIL) 12.5 mg tablet; TAKE 1 TABLET BY MOUTH EVERY DAY    3. Depression with anxiety  -     METABOLIC PANEL, COMPREHENSIVE; Future    4. Encounter for long-term (current) use of medications  -     METABOLIC PANEL, COMPREHENSIVE; Future  -     MICROALBUMIN, UR, RAND W/ MICROALB/CREAT RATIO; Future  -     HEMOGLOBIN A1C WITH EAG; Future      Follow-up and Dispositions    · Return in about 3 months (around 7/23/2021) for HTN, DM2, anxiety, meds, labs. Past Medical History:   Diagnosis Date    Asthma     No problems as adult or teenager--last problems as child    Chronic diarrhea of unknown origin 3/10/2017    Dairy product intolerance 3/10/2017    Depression with anxiety 1/30/2019    Essential hypertension 1/30/2019    Gastroesophageal reflux disease without esophagitis 3/10/2017    History of seasonal allergies     Allegra or Claritin--equally effective.  Non-compliance 1/6/2020    Psychotic disorder ([facility] [address]     Sciatica, right side 3/10/2017    Smoker 2/23/2016       Current Outpatient Medications on File Prior to Visit   Medication Sig Dispense Refill    metoprolol succinate (TOPROL-XL) 25 mg XL tablet TAKE 1 TABLET BY MOUTH EVERY DAY 90 Tab 0    escitalopram oxalate (LEXAPRO) 20 mg tablet TAKE 1 TABLET BY MOUTH EVERY DAY 90 Tab 0    clonazePAM (KlonoPIN) 0.5 mg tablet Take 1 Tab by mouth nightly as needed (anxiety). Max Daily Amount: 0.5 mg. 20 Tab 0    omeprazole (PRILOSEC) 20 mg capsule Take 1 Cap by mouth daily.  27 Cap 2     No current facility-administered medications on file prior to visit. I affirm this is a Patient Initiated Episode with a Patient who has not had a related appointment within my department in the past 7 days or scheduled within the next 24 hours.     Total Time: minutes: 21-30 minutes    Note: not billable if this call serves to triage the patient into an appointment for the relevant concern      Arie Buchanan MD

## 2021-06-30 DIAGNOSIS — F41.8 DEPRESSION WITH ANXIETY: ICD-10-CM

## 2021-06-30 LAB — HBA1C MFR BLD HPLC: 8.1 %

## 2021-06-30 RX ORDER — ESCITALOPRAM OXALATE 20 MG/1
TABLET ORAL
Qty: 90 TABLET | Refills: 0 | Status: SHIPPED | OUTPATIENT
Start: 2021-06-30 | End: 2021-09-30 | Stop reason: SDUPTHER

## 2021-07-01 DIAGNOSIS — I10 ESSENTIAL HYPERTENSION: ICD-10-CM

## 2021-07-02 RX ORDER — METOPROLOL SUCCINATE 25 MG/1
TABLET, EXTENDED RELEASE ORAL
Qty: 90 TABLET | Refills: 0 | Status: SHIPPED | OUTPATIENT
Start: 2021-07-02 | End: 2021-09-27

## 2021-07-02 NOTE — TELEPHONE ENCOUNTER
----- Message from [place] sent at 7/2/2021 11:44 AM EDT -----  Regarding: Dr. Carlos Craft  General Message/Vendor Calls    Caller's first and last name:  Ramon Macias      Reason for call:  Requesting a call back to check status of blood pressure med refill and to schedule lab appt.         Callback required yes/no and why:  yes      Best contact number(s):[phone]      Details to clarify the request:  Rigo Burch [facility]

## 2021-07-04 ENCOUNTER — APPOINTMENT (OUTPATIENT)
Dept: ULTRASOUND IMAGING | Age: 36
End: 2021-07-04
Attending: EMERGENCY MEDICINE
Payer: COMMERCIAL

## 2021-07-04 ENCOUNTER — HOSPITAL ENCOUNTER (EMERGENCY)
Age: 36
Discharge: HOME | End: 2021-07-04
Attending: EMERGENCY MEDICINE
Payer: COMMERCIAL

## 2021-07-04 VITALS
WEIGHT: 228.84 LBS | SYSTOLIC BLOOD PRESSURE: 137 MMHG | HEIGHT: 63 IN | DIASTOLIC BLOOD PRESSURE: 93 MMHG | BODY MASS INDEX: 40.55 KG/M2 | TEMPERATURE: 98.8 F | OXYGEN SATURATION: 99 % | HEART RATE: 78 BPM | RESPIRATION RATE: 18 BRPM

## 2021-07-04 DIAGNOSIS — K85.90 ACUTE PANCREATITIS, UNSPECIFIED COMPLICATION STATUS, UNSPECIFIED PANCREATITIS TYPE: ICD-10-CM

## 2021-07-04 DIAGNOSIS — K21.9 GASTROESOPHAGEAL REFLUX DISEASE WITHOUT ESOPHAGITIS: ICD-10-CM

## 2021-07-04 DIAGNOSIS — E11.65 UNCONTROLLED TYPE 2 DIABETES MELLITUS WITH HYPERGLYCEMIA (HCC): ICD-10-CM

## 2021-07-04 DIAGNOSIS — R10.13 ACUTE EPIGASTRIC PAIN: Primary | ICD-10-CM

## 2021-07-04 LAB
ALBUMIN SERPL-MCNC: 4.3 G/DL (ref 3.5–5)
ALBUMIN/GLOB SERPL: 1.2 {RATIO} (ref 1.1–2.2)
ALP SERPL-CCNC: 94 U/L (ref 45–117)
ALT SERPL-CCNC: 154 U/L (ref 12–78)
ANION GAP SERPL CALC-SCNC: 9 MMOL/L (ref 5–15)
APPEARANCE UR: CLEAR
AST SERPL-CCNC: 87 U/L (ref 15–37)
BACTERIA URNS QL MICRO: NEGATIVE /HPF
BASOPHILS # BLD: 0 K/UL (ref 0–0.1)
BASOPHILS NFR BLD: 1 % (ref 0–1)
BILIRUB SERPL-MCNC: 0.6 MG/DL (ref 0.2–1)
BILIRUB UR QL: NEGATIVE
BUN SERPL-MCNC: 9 MG/DL (ref 6–20)
BUN/CREAT SERPL: 12 (ref 12–20)
CALCIUM SERPL-MCNC: 9.3 MG/DL (ref 8.5–10.1)
CHLORIDE SERPL-SCNC: 100 MMOL/L (ref 97–108)
CO2 SERPL-SCNC: 25 MMOL/L (ref 21–32)
COLOR UR: ABNORMAL
CREAT SERPL-MCNC: 0.75 MG/DL (ref 0.55–1.02)
DIFFERENTIAL METHOD BLD: NORMAL
EOSINOPHIL # BLD: 0.1 K/UL (ref 0–0.4)
EOSINOPHIL NFR BLD: 2 % (ref 0–7)
EPITH CASTS URNS QL MICRO: ABNORMAL /LPF
ERYTHROCYTE [DISTWIDTH] IN BLOOD BY AUTOMATED COUNT: 12.7 % (ref 11.5–14.5)
GLOBULIN SER CALC-MCNC: 3.7 G/DL (ref 2–4)
GLUCOSE SERPL-MCNC: 248 MG/DL (ref 65–100)
GLUCOSE UR STRIP.AUTO-MCNC: >1000 MG/DL
HCT VFR BLD AUTO: 42.6 % (ref 35–47)
HEMOCCULT STL QL: NEGATIVE
HGB BLD-MCNC: 14.7 G/DL (ref 11.5–16)
HGB UR QL STRIP: ABNORMAL
IMM GRANULOCYTES # BLD AUTO: 0 K/UL (ref 0–0.04)
IMM GRANULOCYTES NFR BLD AUTO: 0 % (ref 0–0.5)
KETONES UR QL STRIP.AUTO: >80 MG/DL
LEUKOCYTE ESTERASE UR QL STRIP.AUTO: NEGATIVE
LIPASE SERPL-CCNC: 796 U/L (ref 73–393)
LYMPHOCYTES # BLD: 1.6 K/UL (ref 0.8–3.5)
LYMPHOCYTES NFR BLD: 18 % (ref 12–49)
MCH RBC QN AUTO: 31.5 PG (ref 26–34)
MCHC RBC AUTO-ENTMCNC: 34.5 G/DL (ref 30–36.5)
MCV RBC AUTO: 91.4 FL (ref 80–99)
MONOCYTES # BLD: 0.7 K/UL (ref 0–1)
MONOCYTES NFR BLD: 8 % (ref 5–13)
MUCOUS THREADS URNS QL MICRO: ABNORMAL /LPF
NEUTS SEG # BLD: 6.3 K/UL (ref 1.8–8)
NEUTS SEG NFR BLD: 71 % (ref 32–75)
NITRITE UR QL STRIP.AUTO: NEGATIVE
NRBC # BLD: 0 K/UL (ref 0–0.01)
NRBC BLD-RTO: 0 PER 100 WBC
PH UR STRIP: 6 [PH] (ref 5–8)
PLATELET # BLD AUTO: 247 K/UL (ref 150–400)
PMV BLD AUTO: 10.6 FL (ref 8.9–12.9)
POTASSIUM SERPL-SCNC: 3.9 MMOL/L (ref 3.5–5.1)
PROT SERPL-MCNC: 8 G/DL (ref 6.4–8.2)
PROT UR STRIP-MCNC: 30 MG/DL
RBC # BLD AUTO: 4.66 M/UL (ref 3.8–5.2)
RBC #/AREA URNS HPF: ABNORMAL /HPF (ref 0–5)
SODIUM SERPL-SCNC: 134 MMOL/L (ref 136–145)
SP GR UR REFRACTOMETRY: >1.03 (ref 1–1.03)
UA: UC IF INDICATED,UAUC: ABNORMAL
UROBILINOGEN UR QL STRIP.AUTO: 0.2 EU/DL (ref 0.2–1)
WBC # BLD AUTO: 8.8 K/UL (ref 3.6–11)
WBC URNS QL MICRO: ABNORMAL /HPF (ref 0–4)

## 2021-07-04 PROCEDURE — 83690 ASSAY OF LIPASE: CPT

## 2021-07-04 PROCEDURE — 80053 COMPREHEN METABOLIC PANEL: CPT

## 2021-07-04 PROCEDURE — 85025 COMPLETE CBC W/AUTO DIFF WBC: CPT

## 2021-07-04 PROCEDURE — 96375 TX/PRO/DX INJ NEW DRUG ADDON: CPT

## 2021-07-04 PROCEDURE — 36415 COLL VENOUS BLD VENIPUNCTURE: CPT

## 2021-07-04 PROCEDURE — 82272 OCCULT BLD FECES 1-3 TESTS: CPT

## 2021-07-04 PROCEDURE — 81001 URINALYSIS AUTO W/SCOPE: CPT

## 2021-07-04 PROCEDURE — 96374 THER/PROPH/DIAG INJ IV PUSH: CPT

## 2021-07-04 PROCEDURE — C9113 INJ PANTOPRAZOLE SODIUM, VIA: HCPCS | Performed by: EMERGENCY MEDICINE

## 2021-07-04 PROCEDURE — 99284 EMERGENCY DEPT VISIT MOD MDM: CPT

## 2021-07-04 PROCEDURE — 74011000250 HC RX REV CODE- 250: Performed by: EMERGENCY MEDICINE

## 2021-07-04 PROCEDURE — 74011250636 HC RX REV CODE- 250/636: Performed by: EMERGENCY MEDICINE

## 2021-07-04 PROCEDURE — 76705 ECHO EXAM OF ABDOMEN: CPT

## 2021-07-04 RX ORDER — TRAMADOL HYDROCHLORIDE 50 MG/1
50 TABLET ORAL
Qty: 18 TABLET | Refills: 0 | Status: SHIPPED | OUTPATIENT
Start: 2021-07-04 | End: 2021-07-04 | Stop reason: SDUPTHER

## 2021-07-04 RX ORDER — TRAMADOL HYDROCHLORIDE 50 MG/1
50 TABLET ORAL
Qty: 18 TABLET | Refills: 0 | Status: SHIPPED | OUTPATIENT
Start: 2021-07-04 | End: 2021-07-07

## 2021-07-04 RX ORDER — METOCLOPRAMIDE HYDROCHLORIDE 5 MG/ML
10 INJECTION INTRAMUSCULAR; INTRAVENOUS
Status: COMPLETED | OUTPATIENT
Start: 2021-07-04 | End: 2021-07-04

## 2021-07-04 RX ORDER — ONDANSETRON 4 MG/1
4 TABLET, ORALLY DISINTEGRATING ORAL
Qty: 20 TABLET | Refills: 0 | Status: SHIPPED | OUTPATIENT
Start: 2021-07-04 | End: 2021-07-21 | Stop reason: ALTCHOICE

## 2021-07-04 RX ORDER — ONDANSETRON 4 MG/1
4 TABLET, ORALLY DISINTEGRATING ORAL
Qty: 20 TABLET | Refills: 0 | Status: SHIPPED | OUTPATIENT
Start: 2021-07-04 | End: 2021-07-04 | Stop reason: SDUPTHER

## 2021-07-04 RX ORDER — FENTANYL CITRATE 50 UG/ML
100 INJECTION, SOLUTION INTRAMUSCULAR; INTRAVENOUS
Status: COMPLETED | OUTPATIENT
Start: 2021-07-04 | End: 2021-07-04

## 2021-07-04 RX ORDER — FAMOTIDINE 20 MG/1
20 TABLET, FILM COATED ORAL 2 TIMES DAILY
Qty: 20 TABLET | Refills: 0 | Status: SHIPPED | OUTPATIENT
Start: 2021-07-04 | End: 2021-07-04 | Stop reason: SDUPTHER

## 2021-07-04 RX ORDER — ONDANSETRON 2 MG/ML
4 INJECTION INTRAMUSCULAR; INTRAVENOUS
Status: COMPLETED | OUTPATIENT
Start: 2021-07-04 | End: 2021-07-04

## 2021-07-04 RX ORDER — FAMOTIDINE 20 MG/1
20 TABLET, FILM COATED ORAL 2 TIMES DAILY
Qty: 20 TABLET | Refills: 0 | Status: SHIPPED | OUTPATIENT
Start: 2021-07-04 | End: 2021-07-14

## 2021-07-04 RX ADMIN — SODIUM CHLORIDE 40 MG: 9 INJECTION INTRAMUSCULAR; INTRAVENOUS; SUBCUTANEOUS at 20:35

## 2021-07-04 RX ADMIN — FENTANYL CITRATE 100 MCG: 50 INJECTION, SOLUTION INTRAMUSCULAR; INTRAVENOUS at 21:43

## 2021-07-04 RX ADMIN — METOCLOPRAMIDE 10 MG: 5 INJECTION, SOLUTION INTRAMUSCULAR; INTRAVENOUS at 21:40

## 2021-07-04 RX ADMIN — ONDANSETRON 4 MG: 2 INJECTION INTRAMUSCULAR; INTRAVENOUS at 20:35

## 2021-07-04 RX ADMIN — SODIUM CHLORIDE 1000 ML: 9 INJECTION, SOLUTION INTRAVENOUS at 20:35

## 2021-07-04 NOTE — ED PROVIDER NOTES
EMERGENCY DEPARTMENT HISTORY AND PHYSICAL EXAM      Please note that this dictation was completed with the assistance of \"Dragon\", the computer voice recognition software. Quite often unanticipated grammatical, syntax, homophones, and other interpretive errors are inadvertently transcribed by the computer software. Please disregard these errors and any errors that have escaped final proofreading. Thank you. Patient Name: Darrius Johnson  : 1985  MRN: [id number]  History of Presenting Illness     Chief Complaint   Patient presents with    Melena     pt arrives to the ED with c/o black stool  x2 last week with epigastric x1 week with N/V. pt denies diarrhea. pt states she is concerned for a stomach ulcer    Epigastric Pain     pt states she has mid epigastric pain, pt states she has been taking pepcid without relief in symptoms     History Provided By: Patient    HPI: Darrius Johnson, 28 y.o. female with past medical history as documented below presents to the ED with c/o of acute onset of moderate intensity upper abdominal pain with associated NBNB vomiting for the past week. She also notes having dark tarry stools during this period. She admits to chronic NSAID use. Pt denies any other alleviating or exacerbating factors. Additionally, pt specifically denies any recent fever, chills, headache, CP, SOB, lightheadedness, dizziness, numbness, weakness, lower extremity swelling, heart palpitations, urinary sxs, diarrhea, constipation, cough, or congestion. There are no other complaints, changes or physical findings pertinent to the HPI at this time.     PCP: Mariella Ng MD    Past History   Past Medical History:  Past Medical History:   Diagnosis Date    Asthma     No problems as adult or teenager--last problems as child    Chronic diarrhea of unknown origin 3/10/2017    Dairy product intolerance 3/10/2017    Depression with anxiety 2019    Essential hypertension 2019    Gastroesophageal reflux disease without esophagitis 3/10/2017    History of seasonal allergies     Allegra or Claritin--equally effective.  Non-compliance 1/6/2020    Psychotic disorder ([facility] [address]     Sciatica, right side 3/10/2017    Smoker 2/23/2016       Past Surgical History:  Past Surgical History:   Procedure Laterality Date    PAP SMEAR  10/11       Family History:  Family History   Problem Relation Age of Onset    Diabetes Mother     No Known Problems Father     Hypertension Maternal Aunt     Heart Disease Maternal Grandmother         CAD    Diabetes Maternal Grandmother     Hypertension Maternal Grandmother     No Known Problems Sister        Social History:  Social History     Tobacco Use    Smoking status: Former Smoker     Packs/day: 0.50     Years: 20.00     Pack years: 10.00     Types: Cigarettes    Smokeless tobacco: Never Used   Vaping Use    Vaping Use: Never used   Substance Use Topics    Alcohol use: Yes     Comment: 3 pints of burbon weekly    Drug use: No       Allergies: Allergies   Allergen Reactions    Chantix [Varenicline] Nausea Only    Lisinopril Cough       Current Medications:  No current facility-administered medications on file prior to encounter. Current Outpatient Medications on File Prior to Encounter   Medication Sig Dispense Refill    metoprolol succinate (TOPROL-XL) 25 mg XL tablet TAKE 1 TABLET BY MOUTH EVERY DAY 90 Tablet 0    escitalopram oxalate (LEXAPRO) 20 mg tablet TAKE 1 TABLET BY MOUTH EVERY DAY 90 Tablet 0    metFORMIN ER (GLUCOPHAGE XR) 500 mg tablet Take 1 Tab by mouth daily (with dinner). 90 Tab 1    hydroCHLOROthiazide (HYDRODIURIL) 12.5 mg tablet TAKE 1 TABLET BY MOUTH EVERY DAY 90 Tab 1    clonazePAM (KlonoPIN) 0.5 mg tablet Take 1 Tab by mouth nightly as needed (anxiety). Max Daily Amount: 0.5 mg. 20 Tab 0    omeprazole (PRILOSEC) 20 mg capsule Take 1 Cap by mouth daily.  30 Cap 2     Review of Systems   Review of Systems Constitutional: Negative. Negative for chills and fever. HENT: Negative. Negative for congestion and sore throat. Eyes: Negative. Respiratory: Negative. Negative for cough, chest tightness, shortness of breath and wheezing. Cardiovascular: Negative. Negative for chest pain, palpitations and leg swelling. Gastrointestinal: Positive for abdominal pain, blood in stool, nausea and vomiting. Negative for abdominal distention, constipation and diarrhea. Endocrine: Negative. Genitourinary: Negative. Negative for dysuria, flank pain, frequency, hematuria and urgency. Musculoskeletal: Negative. Negative for arthralgias, back pain and myalgias. Skin: Negative. Negative for color change and rash. Neurological: Negative. Negative for dizziness, syncope, speech difficulty, weakness, light-headedness, numbness and headaches. Hematological: Negative. Psychiatric/Behavioral: Negative. Negative for confusion and self-injury. The patient is not nervous/anxious. All other systems reviewed and are negative. Physical Exam   Physical Exam  Vitals and nursing note reviewed. Constitutional:       General: She is not in acute distress. Appearance: She is well-developed. She is not diaphoretic. HENT:      Head: Normocephalic and atraumatic. Mouth/Throat:      Pharynx: No oropharyngeal exudate. Eyes:      Conjunctiva/sclera: Conjunctivae normal.   Cardiovascular:      Rate and Rhythm: Normal rate and regular rhythm. Heart sounds: Normal heart sounds. Pulmonary:      Effort: Pulmonary effort is normal. No respiratory distress. Breath sounds: Normal breath sounds. No wheezing or rales. Chest:      Chest wall: No tenderness. Abdominal:      General: Bowel sounds are normal. There is no distension. Palpations: Abdomen is soft. There is no mass. Tenderness: There is abdominal tenderness (TTP upper abdomnial, neg Archibald's). There is no guarding or rebound. Musculoskeletal:         General: Normal range of motion. Cervical back: Normal range of motion. Skin:     General: Skin is warm. Neurological:      Mental Status: She is alert and oriented to person, place, and time. Cranial Nerves: No cranial nerve deficit. Motor: No abnormal muscle tone. Diagnostic Study Results     Labs -   I have personally reviewed and interpreted all available laboratory results. Recent Results (from the past 24 hour(s))   CBC WITH AUTOMATED DIFF    Collection Time: 07/04/21  7:47 PM   Result Value Ref Range    WBC 8.8 3.6 - 11.0 K/uL    RBC 4.66 3.80 - 5.20 M/uL    HGB 14.7 11.5 - 16.0 g/dL    HCT 42.6 35.0 - 47.0 %    MCV 91.4 80.0 - 99.0 FL    MCH 31.5 26.0 - 34.0 PG    MCHC 34.5 30.0 - 36.5 g/dL    RDW 12.7 11.5 - 14.5 %    PLATELET 438 191 - 453 K/uL    MPV 10.6 8.9 - 12.9 FL    NRBC 0.0 0  WBC    ABSOLUTE NRBC 0.00 0.00 - 0.01 K/uL    NEUTROPHILS 71 32 - 75 %    LYMPHOCYTES 18 12 - 49 %    MONOCYTES 8 5 - 13 %    EOSINOPHILS 2 0 - 7 %    BASOPHILS 1 0 - 1 %    IMMATURE GRANULOCYTES 0 0.0 - 0.5 %    ABS. NEUTROPHILS 6.3 1.8 - 8.0 K/UL    ABS. LYMPHOCYTES 1.6 0.8 - 3.5 K/UL    ABS. MONOCYTES 0.7 0.0 - 1.0 K/UL    ABS. EOSINOPHILS 0.1 0.0 - 0.4 K/UL    ABS. BASOPHILS 0.0 0.0 - 0.1 K/UL    ABS. IMM. GRANS. 0.0 0.00 - 0.04 K/UL    DF AUTOMATED     METABOLIC PANEL, COMPREHENSIVE    Collection Time: 07/04/21  7:47 PM   Result Value Ref Range    Sodium 134 (L) 136 - 145 mmol/L    Potassium 3.9 3.5 - 5.1 mmol/L    Chloride 100 97 - 108 mmol/L    CO2 25 21 - 32 mmol/L    Anion gap 9 5 - 15 mmol/L    Glucose 248 (H) 65 - 100 mg/dL    BUN 9 6 - 20 MG/DL    Creatinine 0.75 0.55 - 1.02 MG/DL    BUN/Creatinine ratio 12 12 - 20      GFR est AA >60 >60 ml/min/1.73m2    GFR est non-AA >60 >60 ml/min/1.73m2    Calcium 9.3 8.5 - 10.1 MG/DL    Bilirubin, total 0.6 0.2 - 1.0 MG/DL    ALT (SGPT) 154 (H) 12 - 78 U/L    AST (SGOT) 87 (H) 15 - 37 U/L    Alk.  phosphatase 94 45 - 117 U/L    Protein, total 8.0 6.4 - 8.2 g/dL    Albumin 4.3 3.5 - 5.0 g/dL    Globulin 3.7 2.0 - 4.0 g/dL    A-G Ratio 1.2 1.1 - 2.2     LIPASE    Collection Time: 07/04/21  7:47 PM   Result Value Ref Range    Lipase 796 (H) 73 - 393 U/L   OCCULT BLOOD, STOOL    Collection Time: 07/04/21  8:47 PM   Result Value Ref Range    Occult blood, stool Negative NEG     URINALYSIS W/ REFLEX CULTURE    Collection Time: 07/04/21 11:00 PM    Specimen: Urine   Result Value Ref Range    Color YELLOW/STRAW      Appearance CLEAR CLEAR      Specific gravity >1.030 (H) 1.003 - 1.030    pH (UA) 6.0 5.0 - 8.0      Protein 30 (A) NEG mg/dL    Glucose >1,000 (A) NEG mg/dL    Ketone >80 (A) NEG mg/dL    Bilirubin Negative NEG      Blood TRACE (A) NEG      Urobilinogen 0.2 0.2 - 1.0 EU/dL    Nitrites Negative NEG      Leukocyte Esterase Negative NEG      WBC 0-4 0 - 4 /hpf    RBC 0-5 0 - 5 /hpf    Epithelial cells MODERATE (A) FEW /lpf    Bacteria Negative NEG /hpf    UA:UC IF INDICATED CULTURE NOT INDICATED BY UA RESULT CNI      Mucus 1+ (A) NEG /lpf       Radiologic Studies -   I have personally reviewed and interpreted all available imaging studies and agree with radiology interpretation and report. US ABD LTD   Final Result   Normal right upper quadrant ultrasound. CT Results  (Last 48 hours)    None        CXR Results  (Last 48 hours)    None          Medical Decision Making   I reviewed the vital signs, available nursing notes, past medical history, past surgical history, family history and social history. Vital Signs-Reviewed the patient's vital signs.   Patient Vitals for the past 24 hrs:   Temp Pulse Resp BP SpO2   07/04/21 2245 98.8 °F (37.1 °C) 78  (!) 137/93 99 %   07/04/21 2145    131/62 100 %   07/04/21 2130 98.5 °F (36.9 °C) 91 18 (!) 155/84 100 %   07/04/21 2115    (!) 156/95 99 %   07/04/21 2100 98.4 °F (36.9 °C) 85 16 127/76 100 %   07/04/21 1941 98.3 °F (36.8 °C) 96 18 (!) 150/101 99 %     Pulse Oximetry Analysis - 99% on RA    Cardiac Monitor:   Rate: 96 bpm  The cardiac monitor revealed the following rhythm as interpreted by me: Normal Sinus Rhythm    The cardiac monitor was ordered secondary to the patient's history of abdominal pain and to monitor the patient for dysrhythmia    Records Reviewed: Nursing Notes, Old Medical Records, Previous electrocardiograms, Previous Radiology Studies and Previous Laboratory Studies    Provider Notes (Medical Decision Making):   Patient presents with melena. Currently stable vitals without tachycardia. Exam nonperitoneal.  DDx: upper gi bleed 2/2 PUD, Esophageal varices; Iron intake, pepto bismol. Will get labs, obtain two large bore IV's, provide IVF resuscitation, administer IV PPI, send type and screen and transfuse as needed. Will perform rectal exam and monitor closely. ED Course:   I am the first provider for this patient's ED visit today. Initial assessment performed. I discussed presenting problems, concerns and my formulated plan for today's visit with the patient and any available family members at bedside. I encouraged them to ask questions as they arise throughout the visit. Social History     Tobacco Use    Smoking status: Former Smoker     Packs/day: 0.50     Years: 20.00     Pack years: 10.00     Types: Cigarettes    Smokeless tobacco: Never Used   Vaping Use    Vaping Use: Never used   Substance Use Topics    Alcohol use: Yes     Comment: 3 pints of burbon weekly    Drug use: No       I reviewed our electronic medical record system for any past medical records that were available that may contribute to the patient's current condition, the nursing notes and vital signs from today's visit.       ED Orders Placed :  Orders Placed This Encounter    US ABD LTD    OCCULT BLOOD, STOOL    CBC WITH AUTOMATED DIFF    METABOLIC PANEL, COMPREHENSIVE    LIPASE    URINALYSIS W/ REFLEX CULTURE    pantoprazole (PROTONIX) 40 mg in 0.9% sodium chloride 10 mL injection    ondansetron (ZOFRAN) injection 4 mg    sodium chloride 0.9 % bolus infusion 1,000 mL    fentaNYL citrate (PF) injection 100 mcg    metoclopramide HCl (REGLAN) injection 10 mg    DISCONTD: ondansetron (Zofran ODT) 4 mg disintegrating tablet    DISCONTD: famotidine (Pepcid) 20 mg tablet    DISCONTD: traMADoL (Ultram) 50 mg tablet    DISCONTD: aluminum-magnesium hydroxide (MAALOX) 200-200 mg/5 mL suspension    aluminum-magnesium hydroxide (MAALOX) 200-200 mg/5 mL suspension    famotidine (Pepcid) 20 mg tablet    ondansetron (Zofran ODT) 4 mg disintegrating tablet    traMADoL (Ultram) 50 mg tablet       ED Medications Administered:  Medications   pantoprazole (PROTONIX) 40 mg in 0.9% sodium chloride 10 mL injection (40 mg IntraVENous Given 7/4/21 2035)   ondansetron (ZOFRAN) injection 4 mg (4 mg IntraVENous Given 7/4/21 2035)   sodium chloride 0.9 % bolus infusion 1,000 mL (0 mL IntraVENous IV Completed 7/4/21 2303)   fentaNYL citrate (PF) injection 100 mcg (100 mcg IntraVENous Given 7/4/21 2143)   metoclopramide HCl (REGLAN) injection 10 mg (10 mg IntraVENous Given 7/4/21 2140)         Procedure Note - Rectal Exam:   Performed by: Nicolaas Ann MD  Chaperoned by: RN  Rectal exam performed. Brown stool was collected. Stool was collected and sent to the lab for Hemoccult testing. Other findings: no hemorrhoids, no hard stool, no active bleeding   The procedure took 1-15 minutes, and pt tolerated well. Progress Note:  I have re-examined the patient. Pt states she feels much better and symptoms improved. Tolerating oral intake. Abdomen is soft and without guarding, rebound or other peritoneal signs. I have discussed with patient the importance of close f/u and to return to the ED if symptoms don't improve or worsen. Progress Note:  Patient has been reassessed and reports feeling better and symptoms have improved significantly after ED treatment.  Antonina Sorto's final labs and imaging have been reviewed with her and available family and/or caregiver. They have been counseled regarding her diagnosis. She verbally conveys understanding and agreement of the signs, symptoms, diagnosis, treatment and prognosis and additionally agrees to follow up as recommended with Dr. Dilcia Hinojosa MD and/or specialist in 24 - 48 hours. She also agrees with the care-plan we created together and conveys that all of her questions have been answered. I have also put together a packet of discharge instructions for her that include: 1) educational information regarding their diagnosis, 2) how to care for their diagnosis at home, as well a 3) list of reasons why they would want to return to the ED prior to their follow-up appointment should the patient's condition change or symptoms worsen. I have answered all questions to the patient's satisfaction. Strict return precautions given. She both understood and agreed with plan as discussed. Vital signs stable for discharge. Disposition:   DISCHARGE  The pt is ready for discharge. The pt's signs, symptoms, diagnosis, and discharge instructions have been discussed and pt has conveyed their understanding. The pt is to follow up as recommended or return to ER should their symptoms worsen. Plan has been discussed and pt is in agreement. Plan:  1. Return precautions as discussed with patient and available family and/or caregiver. 2.   Discharge Medication List as of 7/4/2021 11:30 PM      CONTINUE these medications which have CHANGED    Details   aluminum-magnesium hydroxide (MAALOX) 200-200 mg/5 mL suspension Take 15 mL by mouth every six (6) hours as needed for Indigestion. , Normal, Disp-300 mL, R-0      famotidine (Pepcid) 20 mg tablet Take 1 Tablet by mouth two (2) times a day for 10 days. , Normal, Disp-20 Tablet, R-0      ondansetron (Zofran ODT) 4 mg disintegrating tablet 1 Tablet by SubLINGual route every eight (8) hours as needed for Nausea or Vomiting., Normal, Disp-20 Tablet, R-0      traMADoL (Ultram) 50 mg tablet Take 1 Tablet by mouth every four (4) hours as needed for Pain for up to 3 days. Max Daily Amount: 300 mg., Normal, Disp-18 Tablet, R-0         CONTINUE these medications which have NOT CHANGED    Details   metoprolol succinate (TOPROL-XL) 25 mg XL tablet TAKE 1 TABLET BY MOUTH EVERY DAY, Normal, Disp-90 Tablet, R-0DX Code Needed  . escitalopram oxalate (LEXAPRO) 20 mg tablet TAKE 1 TABLET BY MOUTH EVERY DAY, Normal, Disp-90 Tablet, R-0      metFORMIN ER (GLUCOPHAGE XR) 500 mg tablet Take 1 Tab by mouth daily (with dinner). , Normal, Disp-90 Tab, R-1      hydroCHLOROthiazide (HYDRODIURIL) 12.5 mg tablet TAKE 1 TABLET BY MOUTH EVERY DAY, Normal, Disp-90 Tab, R-1      clonazePAM (KlonoPIN) 0.5 mg tablet Take 1 Tab by mouth nightly as needed (anxiety). Max Daily Amount: 0.5 mg., Normal, Disp-20 Tab, R-0      omeprazole (PRILOSEC) 20 mg capsule Take 1 Cap by mouth daily. , Normal, Disp-30 Cap, R-2           3. Follow-up Information     Follow up With Specialties Details Why Contact Info    [facility] EMERGENCY DEPT Emergency Medicine  As needed, If symptoms worsen [address] [place] [address]  [phone]    [facility]   As needed, If symptoms worsen [address]  [address]          Instructed to return to ED if worse  Diagnosis   Clinical Impression:  1. Acute epigastric pain    2. Gastroesophageal reflux disease without esophagitis    3. Acute pancreatitis, unspecified complication status, unspecified pancreatitis type    4. Uncontrolled type 2 diabetes mellitus with hyperglycemia (HCC)        Attestation:  Reyna Aburto MD, am the attending of record for this patient. I personally performed the services described in this documentation on this date, 7/4/2021 for patient, Veronica Burrows. I have reviewed the chart and verified that the record is accurate and complete.

## 2021-07-13 ENCOUNTER — TELEPHONE (OUTPATIENT)
Dept: FAMILY MEDICINE CLINIC | Age: 36
End: 2021-07-13

## 2021-07-13 NOTE — TELEPHONE ENCOUNTER
----- Message from [place] sent at 7/13/2021  1:07 PM EDT -----  Regarding: Dr. Sharon Hanna  General Message/Vendor Calls    Caller's first and last name:  Jericho Renato      Reason for call:  Pt was recently sent in ER for acute pancreatis. Labs were done at the ER as well as with her bariatric provider. Are these labs sufficient for her upcoming appt or do they need to be done again.       Callback required yes/no and why:  yes      Best contact number(s):[phone]      Details to clarify the request:  Rigo Burch [facility]

## 2021-07-15 ENCOUNTER — TELEPHONE (OUTPATIENT)
Dept: FAMILY MEDICINE CLINIC | Age: 36
End: 2021-07-15

## 2021-07-15 NOTE — TELEPHONE ENCOUNTER
MD Harini Coronado LPN  Caller: Unspecified (Today,  2:59 PM)  Can you give her apt to me tomorrow virtually instead. I will address her BG then.   Or until next week, she can double up her metformin     Thx     Usha Thomas MD   7/15/2021           Spoke with patient, appt scheduled for tomorrow

## 2021-07-16 ENCOUNTER — VIRTUAL VISIT (OUTPATIENT)
Dept: FAMILY MEDICINE CLINIC | Age: 36
End: 2021-07-16
Payer: COMMERCIAL

## 2021-07-16 DIAGNOSIS — K85.20 ALCOHOL-INDUCED ACUTE PANCREATITIS, UNSPECIFIED COMPLICATION STATUS: ICD-10-CM

## 2021-07-16 DIAGNOSIS — E11.9 NEW ONSET TYPE 2 DIABETES MELLITUS (HCC): ICD-10-CM

## 2021-07-16 DIAGNOSIS — E11.65 TYPE 2 DIABETES MELLITUS WITH HYPERGLYCEMIA, WITHOUT LONG-TERM CURRENT USE OF INSULIN (HCC): Primary | ICD-10-CM

## 2021-07-16 PROCEDURE — 99214 OFFICE O/P EST MOD 30 MIN: CPT | Performed by: FAMILY MEDICINE

## 2021-07-16 RX ORDER — METFORMIN HYDROCHLORIDE 500 MG/1
500 TABLET, EXTENDED RELEASE ORAL 2 TIMES DAILY
Qty: 180 TABLET | Refills: 1 | Status: SHIPPED | OUTPATIENT
Start: 2021-07-16 | End: 2021-07-21 | Stop reason: ALTCHOICE

## 2021-07-16 RX ORDER — INSULIN PUMP SYRINGE, 3 ML
EACH MISCELLANEOUS
Qty: 1 KIT | Refills: 0 | Status: SHIPPED | OUTPATIENT
Start: 2021-07-16 | End: 2021-07-21 | Stop reason: ALTCHOICE

## 2021-07-16 RX ORDER — LANCETS
EACH MISCELLANEOUS
Qty: 50 EACH | Refills: 5 | Status: SHIPPED | OUTPATIENT
Start: 2021-07-16 | End: 2022-09-28 | Stop reason: ALTCHOICE

## 2021-07-16 NOTE — PROGRESS NOTES
Chief Complaint   Patient presents with    Follow-up     discuss elevated glucose, ER visit 7/4/21       1. Have you been to the ER, urgent care clinic since your last visit? Hospitalized since your last visit? Yes When: 7/4/21 ER visit for acute pancreatitis     2. Have you seen or consulted any other health care providers outside of the [address] [place] [address] since your last visit? Include any pap smears or colon screening. No     Pt wants to discuss recent labs and elevated glucose.  She is also considering bariatric surgery this year, wants to discuss any concerns

## 2021-07-16 NOTE — PROGRESS NOTES
Consent: Rodney Gorman, who was seen by synchronous (real-time) audio-video technology, and/or her healthcare decision maker, is aware that this patient-initiated, Telehealth encounter on 7/16/2021 is a billable service, with coverage as determined by her insurance carrier. She is aware that she may receive a bill and has provided verbal consent to proceed: Yes. Rodney Gorman is a 39 y.o. female       has a past medical history of Asthma, Chronic diarrhea of unknown origin (3/10/2017), Dairy product intolerance (3/10/2017), Depression with anxiety (1/30/2019), Essential hypertension (1/30/2019), Gastroesophageal reflux disease without esophagitis (3/10/2017), History of seasonal allergies, Non-compliance (1/6/2020), Psychotic disorder ([facility] [address] Sciatica, right side (3/10/2017), and Smoker (2/23/2016). 12 days ago went to ED for pancreatitis. She drinks alcohol burbon a pint a day. She have stopped drinking for 11 days now. But since her pancreatitis she decided to drink juice all day. All juices all day b/c was on bowel rest and had constipation and so she thought it would help. New diabetes since 04/2021  Diabetes A1C 9.4 07/2021, 6.6% 04/2021, 6.4% 1/2020  Again Education, diet, exercise. Risk, complication, treatment. Change to Metformin 500mg ER BID    Urine in ED showed ketone and > 1000  Yesterday BG >600    I discussed concerns for ketoacidosis, Type 1 diabetes. She doesn't feel bad, no abdo pain. Polyuria  She thinks it's bc/ she's been drinking exclusively all juices and mostly apple juice is why. Also she read online that sweet potatoes can help with diabetes so she's been eating sweet potatoes. I discussed again hospital admission ketoacidosis, etc.    Since our call yesterday she have stop drinking juices. She want to try this for a few days and see if her BG goes down.    Send in glucometer  Increase metformin ER 500mg to BID  Discussed should be seeing BG <200  If >250 consistently to f/up with me sooner. If feeling worse to go to ED    She wanted to talk about weight loss. I had to stress the important of quick detteriation due to her diabetes. We can talk about her weight loss at f/up      Reviewed: active problem list, medication list, allergies, notes from last encounter, lab results    A comprehensive review of systems was negative except for that written in the HPI. Assessment & Plan:   Diagnoses and all orders for this visit:    1. Type 2 diabetes mellitus with hyperglycemia, without long-term current use of insulin (HCC)  -     Blood-Glucose Meter monitoring kit; Brand per insurance. Weekend, unable to do prior [employer]. Pt bg in the 600s. Please give her whatever. -     lancets misc; Check BG BID  -     glucose blood VI test strips (ASCENSIA AUTODISC VI, ONE TOUCH ULTRA TEST VI) strip; Check BID. Brand whatever insurance pay. -     metFORMIN ER (GLUCOPHAGE XR) 500 mg tablet; Take 1 Tablet by mouth two (2) times a day. 2. Alcohol-induced acute pancreatitis, unspecified complication status    3. New onset type 2 diabetes mellitus ([facility] [address]      Follow-up and Dispositions    · Return in about 1 week (around 7/23/2021) for HYperglycemia. 712  Subjective:   Lyric Hutchinson is a 39 y.o. female who was seen for Follow-up (discuss elevated glucose, ER visit 7/4/21)      Prior to Admission medications    Medication Sig Start Date End Date Taking? Authorizing Provider   Blood-Glucose Meter monitoring kit Brand per insurance. Weekend, unable to do prior [employer]. Pt bg in the 600s. Please give her whatever. 7/16/21  Yes MD latoya Salmon misc Check BG BID 7/16/21  Yes Van Staley MD   glucose blood VI test strips (ASCENSIA AUTODISC VI, ONE TOUCH ULTRA TEST VI) strip Check BID. Brand whatever insurance pay. 7/16/21  Yes Herb Morin MD   metFORMIN ER (GLUCOPHAGE XR) 500 mg tablet Take 1 Tablet by mouth two (2) times a day.  7/16/21  Yes Herb Morin MD aluminum-magnesium hydroxide (MAALOX) 200-200 mg/5 mL suspension Take 15 mL by mouth every six (6) hours as needed for Indigestion. 7/4/21  Yes Chuck Dickerson MD   ondansetron (Zofran ODT) 4 mg disintegrating tablet 1 Tablet by SubLINGual route every eight (8) hours as needed for Nausea or Vomiting. 7/4/21  Yes Chuck Dickerson MD   metoprolol succinate (TOPROL-XL) 25 mg XL tablet TAKE 1 TABLET BY MOUTH EVERY DAY 7/2/21  Yes Abby Staley MD   escitalopram oxalate (LEXAPRO) 20 mg tablet TAKE 1 TABLET BY MOUTH EVERY DAY 6/30/21  Yes Abby Staley MD   hydroCHLOROthiazide (HYDRODIURIL) 12.5 mg tablet TAKE 1 TABLET BY MOUTH EVERY DAY 4/29/21  Yes Abby Staley MD   clonazePAM (KlonoPIN) 0.5 mg tablet Take 1 Tab by mouth nightly as needed (anxiety). Max Daily Amount: 0.5 mg. 1/6/21  Yes Abby Staley MD   omeprazole (PRILOSEC) 20 mg capsule Take 1 Cap by mouth daily. 3/10/17  Yes Cassidy Kwon MD     Allergies   Allergen Reactions    Chantix [Varenicline] Nausea Only    Lisinopril Cough         Objective:   Vital Signs: (As obtained by patient/caregiver at home)  There were no vitals taken for this visit.      Constitutional: [x] Appears well-developed and well-nourished [x] No apparent distress        Mental status: [x] Alert and awake  [x] Oriented to person/place/time [x] Able to follow commands      Eyes:   EOM    [x]  Normal      Sclera  [x]  Normal              Discharge [x]  None visible       HENT: [x] Normocephalic, atraumatic    [] Mouth/Throat: Mucous membranes are moist    External Ears [x] Normal      Neck: [x] No visualized mass     Pulmonary/Chest: [x] Respiratory effort normal   [x] No visualized signs of difficulty breathing or respiratory distress           Musculoskeletal:   [x] Normal gait with no signs of ataxia         [x] Normal range of motion of neck         Neurological:        [x] No Facial Asymmetry (Cranial nerve 7 motor function) (limited exam due to video visit)          [x] No gaze palsy              Skin:        [x] No significant exanthematous lesions or discoloration noted on facial skin                  Psychiatric:       [x] Normal Affect [] Abnormal -        [x] No Hallucinations      We discussed the expected course, resolution and complications of the diagnosis(es) in detail. Medication risks, benefits, costs, interactions, and alternatives were discussed as indicated. I advised her to contact the office if her condition worsens, changes or fails to improve as anticipated. She expressed understanding with the diagnosis(es) and plan. Bradly Estevez is a 39 y.o. female being evaluated by a video visit encounter for concerns as above. A caregiver was present when appropriate. Due to this being a TeleHealth encounter (During [facility]-15 public health emergency), evaluation of the following organ systems was limited: Vitals/Constitutional/EENT/Resp/CV/GI//MS/Neuro/Skin/Heme-Lymph-Imm. Pursuant to the emergency declaration under the [facility], 1135 waiver authority and the [employer] Act, this Virtual  Visit was conducted, with patient's (and/or legal guardian's) consent, to reduce the patient's risk of exposure to COVID-19 and provide necessary medical care. Services were provided through a video synchronous discussion virtually to substitute for in-person clinic visit. Patient and provider were located at their individual homes.         Ajay Voss MD

## 2021-07-20 ENCOUNTER — HOSPITAL ENCOUNTER (EMERGENCY)
Age: 36
Discharge: HOME | End: 2021-07-20
Attending: STUDENT IN AN ORGANIZED HEALTH CARE EDUCATION/TRAINING PROGRAM
Payer: COMMERCIAL

## 2021-07-20 VITALS
RESPIRATION RATE: 21 BRPM | OXYGEN SATURATION: 98 % | DIASTOLIC BLOOD PRESSURE: 65 MMHG | SYSTOLIC BLOOD PRESSURE: 102 MMHG | HEART RATE: 94 BPM | TEMPERATURE: 98.2 F

## 2021-07-20 DIAGNOSIS — T78.40XA ALLERGIC REACTION, INITIAL ENCOUNTER: Primary | ICD-10-CM

## 2021-07-20 PROCEDURE — 74011250637 HC RX REV CODE- 250/637: Performed by: EMERGENCY MEDICINE

## 2021-07-20 PROCEDURE — 74011250636 HC RX REV CODE- 250/636: Performed by: STUDENT IN AN ORGANIZED HEALTH CARE EDUCATION/TRAINING PROGRAM

## 2021-07-20 PROCEDURE — 74011636637 HC RX REV CODE- 636/637: Performed by: EMERGENCY MEDICINE

## 2021-07-20 PROCEDURE — 99284 EMERGENCY DEPT VISIT MOD MDM: CPT

## 2021-07-20 PROCEDURE — 96372 THER/PROPH/DIAG INJ SC/IM: CPT

## 2021-07-20 RX ORDER — ONDANSETRON 4 MG/1
4 TABLET, ORALLY DISINTEGRATING ORAL
Status: DISCONTINUED | OUTPATIENT
Start: 2021-07-20 | End: 2021-07-21 | Stop reason: HOSPADM

## 2021-07-20 RX ORDER — EPINEPHRINE 0.3 MG/.3ML
0.3 INJECTION SUBCUTANEOUS
Qty: 1 SYRINGE | Refills: 0 | Status: SHIPPED | OUTPATIENT
Start: 2021-07-20 | End: 2021-07-20

## 2021-07-20 RX ORDER — FAMOTIDINE 20 MG/1
20 TABLET, FILM COATED ORAL
Status: COMPLETED | OUTPATIENT
Start: 2021-07-20 | End: 2021-07-20

## 2021-07-20 RX ORDER — PREDNISONE 20 MG/1
60 TABLET ORAL
Status: COMPLETED | OUTPATIENT
Start: 2021-07-20 | End: 2021-07-20

## 2021-07-20 RX ORDER — PREDNISONE 50 MG/1
50 TABLET ORAL DAILY
Qty: 3 TABLET | Refills: 0 | Status: SHIPPED | OUTPATIENT
Start: 2021-07-20 | End: 2021-07-23

## 2021-07-20 RX ORDER — DIPHENHYDRAMINE HCL 25 MG
25 TABLET ORAL
Qty: 20 TABLET | Refills: 0 | Status: SHIPPED | OUTPATIENT
Start: 2021-07-20 | End: 2022-09-28 | Stop reason: ALTCHOICE

## 2021-07-20 RX ORDER — ONDANSETRON 2 MG/ML
4 INJECTION INTRAMUSCULAR; INTRAVENOUS
Status: DISCONTINUED | OUTPATIENT
Start: 2021-07-20 | End: 2021-07-20

## 2021-07-20 RX ORDER — EPINEPHRINE 1 MG/ML
0.3 INJECTION, SOLUTION, CONCENTRATE INTRAVENOUS
Status: COMPLETED | OUTPATIENT
Start: 2021-07-20 | End: 2021-07-20

## 2021-07-20 RX ADMIN — EPINEPHRINE 0.3 MG: 1 INJECTION INTRAMUSCULAR; INTRAVENOUS; SUBCUTANEOUS at 19:44

## 2021-07-20 RX ADMIN — FAMOTIDINE 20 MG: 20 TABLET ORAL at 20:18

## 2021-07-20 RX ADMIN — PREDNISONE 60 MG: 20 TABLET ORAL at 20:17

## 2021-07-20 NOTE — ED NOTES
Pt. Presents to ED today for complaints of tongue swelling, throat itchiness, redness, eye swelling, and vomiting. Pt. Reports eating a bag of mixed nuts and has no previous nut allergy. Pt. Alert and oriented x4. PT. Placed in position of comfort with call bell in reach.

## 2021-07-21 ENCOUNTER — OFFICE VISIT (OUTPATIENT)
Dept: FAMILY MEDICINE CLINIC | Age: 36
End: 2021-07-21
Payer: COMMERCIAL

## 2021-07-21 VITALS
BODY MASS INDEX: 38.62 KG/M2 | WEIGHT: 218 LBS | SYSTOLIC BLOOD PRESSURE: 108 MMHG | HEIGHT: 63 IN | TEMPERATURE: 98.5 F | HEART RATE: 91 BPM | OXYGEN SATURATION: 96 % | RESPIRATION RATE: 16 BRPM | DIASTOLIC BLOOD PRESSURE: 75 MMHG

## 2021-07-21 DIAGNOSIS — E11.65 TYPE 2 DIABETES MELLITUS WITH HYPERGLYCEMIA, WITHOUT LONG-TERM CURRENT USE OF INSULIN (HCC): Primary | ICD-10-CM

## 2021-07-21 PROCEDURE — 99213 OFFICE O/P EST LOW 20 MIN: CPT | Performed by: FAMILY MEDICINE

## 2021-07-21 RX ORDER — INSULIN LISPRO 100 [IU]/ML
INJECTION, SUSPENSION SUBCUTANEOUS
Qty: 5 ADJUSTABLE DOSE PRE-FILLED PEN SYRINGE | Refills: 2 | Status: SHIPPED | OUTPATIENT
Start: 2021-07-21 | End: 2021-09-30

## 2021-07-21 NOTE — PROGRESS NOTES
Chief Complaint   Patient presents with    Blood sugar problem     1 week fu        1. Have you been to the ER, urgent care clinic since your last visit? Hospitalized since your last visit? Yes When: ED [facility] ER 7/16/21 allergic reaction to nuts    2. Have you seen or consulted any other health care providers outside of the [address] [place] [address] since your last visit? Include any pap smears or colon screening.  No

## 2021-07-21 NOTE — ED NOTES
Bedside and Verbal shift change report given to Raúl Machado RN and Orion Miguel RN (oncoming nurse) by Chris Parsons RN (offgoing nurse). Report included the following information SBAR, ED Summary, MAR and Recent Results.

## 2021-07-21 NOTE — ED NOTES
Patient notified and per patient he will call back for appt. Pt asked if she could have PO or IM medications instead of IV. Talked to MD about changing IV medications to PO. Orders changed per Dr. Sil Gutierrez. Notified Dr. Benjamin Weiss as well. Pt tolerated PO medications.

## 2021-07-21 NOTE — ED NOTES
Report received from Macarena Boo, [facility]. They advised of the patient's chief complaint, current status, orders completed (to include IV access/medications/radiology testing), outstanding orders that still need to be completed, and the treatment plan. Questions asked and answered prior to assumption of care.

## 2021-07-21 NOTE — ED PROVIDER NOTES
EMERGENCY DEPARTMENT HISTORY AND PHYSICAL EXAM      Date: 7/20/2021  Patient Name: Evelia Dominguez    History of Presenting Illness     Chief Complaint   Patient presents with    Allergic Reaction         HPI: Evelia Dominguez, 39 y.o. female presents to the ED with cc of facial swelling and throat swelling and itching. This started suddenly just prior to arrival after she was eating some mixed nuts. She states that her eyes and face became puffy, her throat became itchy, and she has this sensation of tongue and throat scratchiness and swelling. She feels slightly short of breath, and has had some associated nausea with 3-4 episodes of emesis. She has not had any history of allergic reactions in the past.  Denies any other new exposures. No fevers, no chest pain. She reports itching of her face and around her scalp. There are no other complaints, changes, or physical findings at this time. PCP: Ashley Ryan MD    No current facility-administered medications on file prior to encounter. Current Outpatient Medications on File Prior to Encounter   Medication Sig Dispense Refill    Blood-Glucose Meter monitoring kit Brand per insurance. Weekend, unable to do prior [employer]. Pt bg in the 600s. Please give her whatever. 1 Kit 0    lancets misc Check BG BID 50 Each 5    glucose blood VI test strips (ASCENSIA AUTODISC VI, ONE TOUCH ULTRA TEST VI) strip Check BID. Brand whatever insurance pay. 50 Strip 2    metFORMIN ER (GLUCOPHAGE XR) 500 mg tablet Take 1 Tablet by mouth two (2) times a day. 180 Tablet 1    aluminum-magnesium hydroxide (MAALOX) 200-200 mg/5 mL suspension Take 15 mL by mouth every six (6) hours as needed for Indigestion. 300 mL 0    ondansetron (Zofran ODT) 4 mg disintegrating tablet 1 Tablet by SubLINGual route every eight (8) hours as needed for Nausea or Vomiting.  20 Tablet 0    metoprolol succinate (TOPROL-XL) 25 mg XL tablet TAKE 1 TABLET BY MOUTH EVERY DAY 90 Tablet 0    escitalopram oxalate (LEXAPRO) 20 mg tablet TAKE 1 TABLET BY MOUTH EVERY DAY 90 Tablet 0    hydroCHLOROthiazide (HYDRODIURIL) 12.5 mg tablet TAKE 1 TABLET BY MOUTH EVERY DAY 90 Tab 1    clonazePAM (KlonoPIN) 0.5 mg tablet Take 1 Tab by mouth nightly as needed (anxiety). Max Daily Amount: 0.5 mg. 20 Tab 0    omeprazole (PRILOSEC) 20 mg capsule Take 1 Cap by mouth daily. 30 Cap 2       Past History     Past Medical History:  Past Medical History:   Diagnosis Date    Asthma     No problems as adult or teenager--last problems as child    Chronic diarrhea of unknown origin 3/10/2017    Dairy product intolerance 3/10/2017    Depression with anxiety 1/30/2019    Essential hypertension 1/30/2019    Gastroesophageal reflux disease without esophagitis 3/10/2017    History of seasonal allergies     Allegra or Claritin--equally effective.  Non-compliance 1/6/2020    Psychotic disorder ([facility] [address]     Sciatica, right side 3/10/2017    Smoker 2/23/2016       Past Surgical History:  Past Surgical History:   Procedure Laterality Date    PAP SMEAR  10/11       Family History:  Family History   Problem Relation Age of Onset    Diabetes Mother     No Known Problems Father     Hypertension Maternal Aunt     Heart Disease Maternal Grandmother         CAD    Diabetes Maternal Grandmother     Hypertension Maternal Grandmother     No Known Problems Sister        Social History:  Social History     Tobacco Use    Smoking status: Former Smoker     Packs/day: 0.50     Years: 20.00     Pack years: 10.00     Types: Cigarettes    Smokeless tobacco: Never Used   Vaping Use    Vaping Use: Never used   Substance Use Topics    Alcohol use: Yes     Comment: 3 pints of burbon weekly    Drug use: No       Allergies:   Allergies   Allergen Reactions    Chantix [Varenicline] Nausea Only    Lisinopril Cough         Review of Systems   no fever  Reports swelling around her eyes  No ear pain  Reports shortness of breath  no chest pain  no abdominal pain  no dysuria  no leg pain  Reports skin itching  No lymphadenopathy  No weight loss    Physical Exam   Physical Exam  Constitutional:       General: She is not in acute distress. Appearance: She is not toxic-appearing. HENT:      Head: Normocephalic. Mouth/Throat:      Mouth: Mucous membranes are moist.      Pharynx: No posterior oropharyngeal erythema. Comments: No stridor, no drooling, no trismus, she is breathing comfortably, oropharynx is patent without any visible significant oropharyngeal swelling  Eyes:      Comments: Mild periorbital edema   Cardiovascular:      Rate and Rhythm: Normal rate and regular rhythm. Pulmonary:      Effort: Pulmonary effort is normal.      Breath sounds: Normal breath sounds. Abdominal:      Palpations: Abdomen is soft. Tenderness: There is no abdominal tenderness. Musculoskeletal:         General: No swelling or tenderness. Cervical back: Neck supple. Skin:     General: Skin is warm and dry. Findings: No rash. Neurological:      General: No focal deficit present. Mental Status: She is alert and oriented to person, place, and time. Psychiatric:         Mood and Affect: Mood normal.         Diagnostic Study Results     Labs -   No results found for this or any previous visit (from the past 24 hour(s)). Radiologic Studies -   No orders to display     CT Results  (Last 48 hours)    None        CXR Results  (Last 48 hours)    None            Medical Decision Making   I am the first provider for this patient. I reviewed the vital signs, available nursing notes, past medical history, past surgical history, family history and social history. Vital Signs-Reviewed the patient's vital signs.   Patient Vitals for the past 24 hrs:   Temp Pulse Resp BP SpO2   07/20/21 2000 -- 90 26 102/65 95 %   07/20/21 1944 -- 85 -- (!) 119/57 --   07/20/21 1915 98.2 °F (36.8 °C) 98 26 123/62 97 %         Provider Notes (Medical Decision Making):   68-year-old female presenting with facial swelling and tongue itching and swelling after eating nuts. Concern for allergic reaction, anaphylaxis. Her vital signs are unremarkable, there are no signs of emergent impending airway compromise, however given her sensation of throat irritation and swelling, she will be given epinephrine IM, will also be given Solu-Medrol and Pepcid, she already received Benadryl prior to arrival.  She was in her normal state of health prior to this, low concern for any electrolyte or metabolic abnormality, or any infectious process. ED Course:     Initial assessment performed. The patients presenting problems have been discussed, and they are in agreement with the care plan formulated and outlined with them. I have encouraged them to ask questions as they arise throughout their visit. Patient is observed in the emergency department, on reevaluation, she is resting comfortably, states that she feels significantly improved, swelling is improved, no longer has any sensation of throat swelling. Her vitals are stable. I explained that I would prefer to observe her for at least 4 hours, however she states she would like to go home, her mom is at bedside and states that she would help observe her while she is at home, and she will immediately return for any return of swelling, itching, any throat symptoms or shortness of breath. She will be discharged with prednisone, Benadryl and EpiPen. She has an appoint with her primary care doctor already scheduled. Critical Care Time:         Disposition:  Home    PLAN:  1. Current Discharge Medication List        2.    Follow-up Information    None       Return to ED if worse     Diagnosis     Clinical Impression: Acute allergic reaction

## 2021-07-21 NOTE — PROGRESS NOTES
Charo Armenta is a 39 y.o. female     has a past medical history of Asthma, Chronic diarrhea of unknown origin (3/10/2017), Dairy product intolerance (3/10/2017), Depression with anxiety (1/30/2019), Essential hypertension (1/30/2019), Gastroesophageal reflux disease without esophagitis (3/10/2017), History of seasonal allergies, Non-compliance (1/6/2020), Psychotic disorder ([address] Sciatica, right side (3/10/2017), and Smoker (2/23/2016). But since her pancreatitis she decided to drink juice all day. All juices all day b/c was on bowel rest and had constipation and so she thought it would help. Urine in ED showed ketone and > 1000   BG >600    New diabetes since 04/2021  Diabetes A1C 9.4 07/2021, 6.6% 04/2021, 6.4% 1/2020  Again Education, diet, exercise. Risk, complication, treatment. Changed to Metformin 500mg ER BID  [address]  She's checking her bg TID. Likely type 1 DM now  D/c Metformin  Start 70/30 at 20u BID for now, f/u 2 weeks     3 wks ago went to ED for pancreatitis. She drinks alcohol burbon a pint a day. She have stopped drinking for 3wks now.       She wanted to talk about weight loss. I had to stress the important of quick detteriation due to her diabetes. We can talk about her weight loss at f/up      Reviewed: active problem list, medication list, allergies, notes from last encounter, lab results    A comprehensive review of systems was negative except for that written in the HPI. Allergies   Allergen Reactions    Chantix [Varenicline] Nausea Only    Lisinopril Cough     Current Outpatient Medications on File Prior to Visit   Medication Sig Dispense Refill    predniSONE (DELTASONE) 50 mg tablet Take 1 Tablet by mouth daily for 3 days. 3 Tablet 0    diphenhydrAMINE (Benadryl Allergy) 25 mg tablet Take 1 Tablet by mouth every six (6) hours as needed for Itching.  20 Tablet 0    lancets misc Check BG BID 50 Each 5    glucose blood VI test strips (ASCENSIA AUTODISC VI, ONE TOUCH ULTRA TEST VI) strip Check BID. Brand whatever insurance pay. 50 Strip 2    metoprolol succinate (TOPROL-XL) 25 mg XL tablet TAKE 1 TABLET BY MOUTH EVERY DAY 90 Tablet 0    escitalopram oxalate (LEXAPRO) 20 mg tablet TAKE 1 TABLET BY MOUTH EVERY DAY 90 Tablet 0    hydroCHLOROthiazide (HYDRODIURIL) 12.5 mg tablet TAKE 1 TABLET BY MOUTH EVERY DAY 90 Tab 1    clonazePAM (KlonoPIN) 0.5 mg tablet Take 1 Tab by mouth nightly as needed (anxiety). Max Daily Amount: 0.5 mg. 20 Tab 0    omeprazole (PRILOSEC) 20 mg capsule Take 1 Cap by mouth daily. 30 Cap 2    [] EPINEPHrine (EPIPEN) 0.3 mg/0.3 mL injection 0.3 mL by IntraMUSCular route once as needed for Allergic Response for up to 1 dose.  1 Syringe 0     Current Facility-Administered Medications on File Prior to Visit   Medication Dose Route Frequency Provider Last Rate Last Admin    [COMPLETED] EPINEPHrine HCl (PF) (ADRENALIN) 1 mg/mL (1 mL) injection 0.3 mg  0.3 mg IntraMUSCular NOW Casper Donald MD   0.3 mg at 21 194    [COMPLETED] famotidine (PEPCID) tablet 20 mg  20 mg Oral NOW Luciano Caicedo MD   20 mg at 21    [COMPLETED] predniSONE (DELTASONE) tablet 60 mg  60 mg Oral NOW Luciano Caicedo MD   60 mg at 21 2017     Patient Active Problem List   Diagnosis Code    Migraine G43.909    Allergic rhinitis--worse with allergies J30.9    Smoker F17.200    Gastroesophageal reflux disease without esophagitis K21.9    Sciatica, right side M54.31    Dairy product intolerance K90.49    Chronic diarrhea of unknown origin K52.9    Essential hypertension I10    Depression with anxiety F41.8    Severe obesity ([facility] [address] E66.01    Depression, major, recurrent, mild (HCC) F33.0    Non-compliance Z91.19       Visit Vitals  /75 (BP 1 Location: Left upper arm, BP Patient Position: Sitting, BP Cuff Size: Adult)   Pulse 91   Temp 98.5 °F (36.9 °C) (Oral)   Resp 16   Ht 5' 3\" (1.6 m)   Wt 218 lb (98.9 kg)   SpO2 96% BMI 38.62 kg/m²       General appearance: alert, cooperative, no distress, appears stated age  Neurologic: Alert and oriented X 3, normal strength and tone, symmetric. Normal without focal findings. Cranial nerves 2-12 intact. Normal coordination and gait. Mental status: Alert, oriented, thought content appropriate, affect: stable, mood-congruent. Head: Normocephalic, without obvious abnormality, atraumatic  Eyes: conjunctivae/corneas clear. PERRL, EOM's intact. Neck: supple, symmetrical, trachea midline, no JVD  Lungs: clear to auscultation bilaterally  Heart: regular rate and rhythm, S1, S2 normal, no murmur, click, rub or gallop  Abdomen: soft, non-tender. Extremities: extremities normal, atraumatic, no cyanosis or edema      Assessment/Plans:    Diagnoses and all orders for this visit:    1. Type 2 diabetes mellitus with hyperglycemia, without long-term current use of insulin (HCC)  -     insulin lispro protamin-lispro (HUMALOG 75-25 MIX) flexpen; 20u with Brk and dinner      Discussed plans, risk/benefits of treatments/observations. Through the use of shared decision making, above plans were agreed upon. Medication compliance advised. Patient verbalized understanding. Follow-up and Dispositions    · Return in about 2 weeks (around 8/4/2021) for DM2.          Hawa Flores MD  7/21/2021

## 2021-08-05 ENCOUNTER — TELEPHONE (OUTPATIENT)
Dept: FAMILY MEDICINE CLINIC | Age: 36
End: 2021-08-05

## 2021-08-05 ENCOUNTER — DOCUMENTATION ONLY (OUTPATIENT)
Dept: FAMILY MEDICINE CLINIC | Age: 36
End: 2021-08-05

## 2021-08-05 ENCOUNTER — VIRTUAL VISIT (OUTPATIENT)
Dept: FAMILY MEDICINE CLINIC | Age: 36
End: 2021-08-05
Payer: COMMERCIAL

## 2021-08-05 DIAGNOSIS — Z71.3 WEIGHT LOSS COUNSELING, ENCOUNTER FOR: ICD-10-CM

## 2021-08-05 DIAGNOSIS — E11.65 TYPE 2 DIABETES MELLITUS WITH HYPERGLYCEMIA, WITHOUT LONG-TERM CURRENT USE OF INSULIN (HCC): Primary | ICD-10-CM

## 2021-08-05 PROCEDURE — 99213 OFFICE O/P EST LOW 20 MIN: CPT | Performed by: FAMILY MEDICINE

## 2021-08-05 RX ORDER — EPINEPHRINE 0.3 MG/.3ML
INJECTION SUBCUTANEOUS
COMMUNITY
Start: 2021-07-21

## 2021-08-05 RX ORDER — LANCETS 33 GAUGE
EACH MISCELLANEOUS
COMMUNITY
Start: 2021-07-16

## 2021-08-05 RX ORDER — INSULIN ASPART 100 [IU]/ML
INJECTION, SUSPENSION SUBCUTANEOUS
Qty: 5 ADJUSTABLE DOSE PRE-FILLED PEN SYRINGE | Refills: 1 | Status: SHIPPED | OUTPATIENT
Start: 2021-08-05 | End: 2021-09-30 | Stop reason: SDUPTHER

## 2021-08-05 RX ORDER — BLOOD-GLUCOSE METER
EACH MISCELLANEOUS
COMMUNITY
Start: 2021-07-16 | End: 2021-09-30 | Stop reason: ALTCHOICE

## 2021-08-05 NOTE — PROGRESS NOTES
Chief Complaint   Patient presents with    Diabetes     2 week check    1. Have you been to the ER, urgent care clinic since your last visit? Hospitalized since your last visit? No     2. Have you seen or consulted any other health care providers outside of the [address] [place] [address] since your last visit? Include any pap smears or colon screening.  No

## 2021-08-05 NOTE — PROGRESS NOTES
Consent: Evelia Dominguez, who was seen by synchronous (real-time) audio-video technology, and/or her healthcare decision maker, is aware that this patient-initiated, Telehealth encounter on 8/5/2021 is a billable service, with coverage as determined by her insurance carrier. She is aware that she may receive a bill and has provided verbal consent to proceed: Yes. Evelia Dominguez is a 39 y.o. female       has a past medical history of Asthma, Chronic diarrhea of unknown origin (3/10/2017), Dairy product intolerance (3/10/2017), Depression with anxiety (1/30/2019), Essential hypertension (1/30/2019), Gastroesophageal reflux disease without esophagitis (3/10/2017), History of seasonal allergies, Non-compliance (1/6/2020), [facility] Sciatica, right side (3/10/2017), and Smoker (2/23/2016). Pancreatitis 07/04/21021 after that ED visit; she decided to drink juice all day for about 2 weeks until she saw me and I told her that was wrong for her diabetes and many other reason. Urine in ED showed ketone and > 1000   BG >600     New diabetes since 04/2021  Diabetes A1C 9.4 07/2021, 6.6% 04/2021, 6.4% 1/2020  Again Education, diet, exercise. Risk, complication, treatment. Changed to Metformin 500mg ER BID  [address]  She's checking her bg TID. Likely type 1 DM now  D/c Metformin  Last visit we started 70/30 at 20u BID for now, f/u 2 weeks. But she never got it, says insurance won't approve. But she got Novolog from her friend 3 pens. Was doing 25u morning and only 1 time    BG before breakfast insulin 220s, before lunch 196   More education about type of insulin   Send in novolog 70/30 mix at 25u BID   F/up 3 weeks    Started 1 week ago  brkf 1 protein shake  Lunch 2 eggs and 1 piece of turkey coronel  jerkey beef stick and cheese or peanuts  Dinner smoker turkey meat string bean and hamburger    She's gaining weight.  Discussed insulin does that normally, she need to cut down portion, and she only started her diet 1 week      A comprehensive review of systems was negative except for that written in the HPI. Assessment & Plan:   Diagnoses and all orders for this visit:    1. Type 2 diabetes mellitus with hyperglycemia, without long-term current use of insulin (HCC)  -     insulin aspart protamine/insulin aspart (NOVOLOG MIX 70/30) 100 unit/mL (70-30) inpn; 25u SQ w breakfast and 25u SQ with dinner    2. Weight loss counseling, encounter for      Follow-up and Dispositions    · Return in about 3 weeks (around 8/26/2021) for insulin adjustment. Nick Engle2  Subjective:   [address] Kevin Hutchinson is a 39 y.o. female who was seen for Diabetes      Prior to Admission medications    Medication Sig Start Date End Date Taking? Authorizing Provider   True Metrix Glucose Meter misc USE AS DIRECTED 7/16/21  Yes Provider, Historical   lancets 33 gauge misc CHECK BG TWICE A DAY 7/16/21  Yes Provider, Historical   insulin aspart protamine/insulin aspart (NOVOLOG MIX 70/30) 100 unit/mL (70-30) inpn 25u SQ w breakfast and 25u SQ with dinner 8/5/21  Yes Lulu Staley MD   diphenhydrAMINE (Benadryl Allergy) 25 mg tablet Take 1 Tablet by mouth every six (6) hours as needed for Itching. 7/20/21  Yes Manuel Donald MD   lancets misc Check BG BID 7/16/21  Yes Lulu Staley MD   glucose blood VI test strips (ASCENSIA AUTODISC VI, ONE TOUCH ULTRA TEST VI) strip Check BID. Brand whatever insurance pay. 7/16/21  Yes Janelle Berrios MD   metoprolol succinate (TOPROL-XL) 25 mg XL tablet TAKE 1 TABLET BY MOUTH EVERY DAY 7/2/21  Yes Lulu Staley MD   escitalopram oxalate (LEXAPRO) 20 mg tablet TAKE 1 TABLET BY MOUTH EVERY DAY 6/30/21  Yes Lulu Staley MD   hydroCHLOROthiazide (HYDRODIURIL) 12.5 mg tablet TAKE 1 TABLET BY MOUTH EVERY DAY 4/29/21  Yes Lulu Staley MD   clonazePAM (KlonoPIN) 0.5 mg tablet Take 1 Tab by mouth nightly as needed (anxiety).  Max Daily Amount: 0.5 mg. 1/6/21  Yes Janelle Berrios MD   omeprazole (PRILOSEC) 20 mg capsule Take 1 Cap by mouth daily. 3/10/17  Yes Ngtaluma, Quinn Tran MD   EPINEPHrine (EPIPEN) 0.3 mg/0.3 mL injection 0.3 ML BY INTRAMUSCULAR ROUTE ONCE AS NEEDED FOR ALLERGIC RESPONSE FOR UP TO 1 DOSE. Patient not taking: Reported on 8/5/2021 7/21/21   Provider, Historical   insulin lispro protamin-lispro (HUMALOG 75-25 MIX) flexpen 20u with Brk and dinner  Patient not taking: Reported on 8/5/2021 7/21/21   Kaleigh Francisco MD     Allergies   Allergen Reactions    Chantix [Varenicline] Nausea Only    Lisinopril Cough         Objective:   Vital Signs: (As obtained by patient/caregiver at home)  Visit Vitals  LMP 07/17/2021        Constitutional: [x] Appears well-developed and well-nourished [x] No apparent distress        Mental status: [x] Alert and awake  [x] Oriented to person/place/time [x] Able to follow commands      Eyes:   EOM    [x]  Normal      Sclera  [x]  Normal              Discharge [x]  None visible       HENT: [x] Normocephalic, atraumatic    [] Mouth/Throat: Mucous membranes are moist    External Ears [x] Normal      Neck: [x] No visualized mass     Pulmonary/Chest: [x] Respiratory effort normal   [x] No visualized signs of difficulty breathing or respiratory distress           Musculoskeletal:   [x] Normal gait with no signs of ataxia         [x] Normal range of motion of neck         Neurological:        [x] No Facial Asymmetry (Cranial nerve 7 motor function) (limited exam due to video visit)          [x] No gaze palsy              Skin:        [x] No significant exanthematous lesions or discoloration noted on facial skin                  Psychiatric:       [x] Normal Affect [] Abnormal -        [x] No Hallucinations      We discussed the expected course, resolution and complications of the diagnosis(es) in detail. Medication risks, benefits, costs, interactions, and alternatives were discussed as indicated.   I advised her to contact the office if her condition worsens, changes or fails to improve as anticipated. She expressed understanding with the diagnosis(es) and plan. Marti Humphries is a 39 y.o. female being evaluated by a video visit encounter for concerns as above. A caregiver was present when appropriate. Due to this being a TeleHealth encounter ([facility]), evaluation of the following organ systems was limited: Vitals/Constitutional/EENT/Resp/CV/GI//MS/Neuro/Skin/Heme-Lymph-Imm. Pursuant to the emergency declaration under the [facility], [facility] waiver authority and the [employer] and [employer] Act, this Virtual  Visit was conducted, with patient's (and/or legal guardian's) consent, to reduce the patient's risk of exposure to COVID-19 and provide necessary medical care. Services were provided through a video synchronous discussion virtually to substitute for in-person clinic visit. Patient and provider were located at their individual homes.         Janneth Everett MD

## 2021-08-05 NOTE — TELEPHONE ENCOUNTER
----- Message from [employer] sent at 8/5/2021 10:46 AM EDT -----  Regarding: Dr. White Brownfield  Patient return call    Caller's first and last name and relationship (if not the patient): Emma Dempsey contact number(s): [phone]      Whose call is being returned: na      Details to clarify the request:      [employer]

## 2021-09-21 DIAGNOSIS — F41.8 DEPRESSION WITH ANXIETY: ICD-10-CM

## 2021-09-24 ENCOUNTER — TELEPHONE (OUTPATIENT)
Dept: FAMILY MEDICINE CLINIC | Age: 36
End: 2021-09-24

## 2021-09-24 RX ORDER — ESCITALOPRAM OXALATE 20 MG/1
TABLET ORAL
Qty: 90 TABLET | Refills: 0 | OUTPATIENT
Start: 2021-09-24

## 2021-09-24 NOTE — TELEPHONE ENCOUNTER
----- Message from [facility] sent at 9/24/2021 12:11 PM EDT -----  Regarding: Dr. Luong Book / Telephone  Patient return call    Caller's first and last name and relationship (if not the patient): N/A      Best contact number(s):[phone]      Whose call is being returned: Nurse      Details to clarify the request:      [facility]

## 2021-09-27 DIAGNOSIS — I10 ESSENTIAL HYPERTENSION: ICD-10-CM

## 2021-09-27 RX ORDER — METOPROLOL SUCCINATE 25 MG/1
TABLET, EXTENDED RELEASE ORAL
Qty: 90 TABLET | Refills: 0 | Status: SHIPPED | OUTPATIENT
Start: 2021-09-27 | End: 2021-12-21

## 2021-09-30 ENCOUNTER — VIRTUAL VISIT (OUTPATIENT)
Dept: FAMILY MEDICINE CLINIC | Age: 36
End: 2021-09-30
Payer: COMMERCIAL

## 2021-09-30 DIAGNOSIS — Z79.899 ENCOUNTER FOR LONG-TERM (CURRENT) USE OF MEDICATIONS: ICD-10-CM

## 2021-09-30 DIAGNOSIS — F41.8 DEPRESSION WITH ANXIETY: ICD-10-CM

## 2021-09-30 DIAGNOSIS — E66.01 SEVERE OBESITY (BMI 35.0-35.9 WITH COMORBIDITY) (HCC): ICD-10-CM

## 2021-09-30 DIAGNOSIS — I10 ESSENTIAL HYPERTENSION: ICD-10-CM

## 2021-09-30 DIAGNOSIS — E11.65 TYPE 2 DIABETES MELLITUS WITH HYPERGLYCEMIA, WITHOUT LONG-TERM CURRENT USE OF INSULIN (HCC): Primary | ICD-10-CM

## 2021-09-30 PROCEDURE — 99214 OFFICE O/P EST MOD 30 MIN: CPT | Performed by: FAMILY MEDICINE

## 2021-09-30 RX ORDER — PEN NEEDLE, DIABETIC 31 GX3/16"
NEEDLE, DISPOSABLE MISCELLANEOUS
Qty: 200 PEN NEEDLE | Refills: 1 | Status: SHIPPED | OUTPATIENT
Start: 2021-09-30

## 2021-09-30 RX ORDER — ESCITALOPRAM OXALATE 20 MG/1
20 TABLET ORAL DAILY
Qty: 90 TABLET | Refills: 1 | Status: SHIPPED | OUTPATIENT
Start: 2021-09-30 | End: 2022-02-18 | Stop reason: SDUPTHER

## 2021-09-30 RX ORDER — INSULIN ASPART 100 [IU]/ML
INJECTION, SUSPENSION SUBCUTANEOUS
Qty: 5 ADJUSTABLE DOSE PRE-FILLED PEN SYRINGE | Refills: 1 | Status: SHIPPED | OUTPATIENT
Start: 2021-09-30 | End: 2022-01-17

## 2021-09-30 RX ORDER — HYDROCHLOROTHIAZIDE 12.5 MG/1
TABLET ORAL
Qty: 90 TABLET | Refills: 1 | Status: SHIPPED | OUTPATIENT
Start: 2021-09-30 | End: 2022-02-18 | Stop reason: SDUPTHER

## 2021-09-30 NOTE — PROGRESS NOTES
Chief Complaint   Patient presents with    Diabetes       1. Have you been to the ER, urgent care clinic since your last visit? Hospitalized since your last visit? No     2. Have you seen or consulted any other health care providers outside of the [address] [place] [address] since your last visit? Include any pap smears or colon screening.  No

## 2021-09-30 NOTE — PROGRESS NOTES
Consent: Cate Ashton, who was seen by synchronous (real-time) audio-video technology, and/or her healthcare decision maker, is aware that this patient-initiated, Telehealth encounter on 9/30/2021 is a billable service, with coverage as determined by her insurance carrier. She is aware that she may receive a bill and has provided verbal consent to proceed: Yes. Cate Ashton is a 39 y.o. female    LMP: 09/13/2021       has a past medical history of Asthma, Chronic diarrhea of unknown origin (3/10/2017), Dairy product intolerance (3/10/2017), Depression with anxiety (1/30/2019), Essential hypertension (1/30/2019), Gastroesophageal reflux disease without esophagitis (3/10/2017), History of seasonal allergies, Non-compliance (1/6/2020), [facility]otic [facility] ([facility] Sciatica, right side (3/10/2017), and Smoker (2/23/2016). Pancreatitis 07/04/21021 after that ED visit; she decided to drink juice all day for about 2 weeks until she saw me and I told her that was wrong for her diabetes and many other reason. Urine in ED showed ketone and > 1000   BG >600     New diabetes since 04/2021  Diabetes A1C 9.4 07/2021, 6.6% 04/2021, 6.4% 1/2020  Again Education, diet, exercise. Risk, complication, treatment. Changed to Metformin 500mg ER BID  [address]  She's checking her bg TID. Likely type 1 DM now  D/c Metformin  Last visit we started 70/30 at 20u BID for now, f/u 2 weeks. But she never got it, says insurance won't approve. But she got Novolog from her friend [employer]. Was doing 25u morning and only 1 time    BG before breakfast insulin 220s, before lunch 196   More education about type of insulin   Last visit sent in novolog 70/30 mix at 25u BID    But she's taking 25u in morning and evening only 10-15 b/c she wakes feeling jittery/weird,     Her lowest was 79 once. But usually even in the 119 she can feel the low.      Ok to continue course     Weight loss counseling  Currently 220lb  Started  8 weeks ago  brkf 1 protein shake  Lunch 2 eggs and 1 piece of turkey coronel  jerkey beef stick and cheese or peanuts  Dinner smoker turkey meat string bean and hamburger  Discussed topamax and surgery  Currently she just wan to do it herself. HTN: BP is good at home today.   metoprolol  Last visit we started hctz 12.5mg  Continue course     , discussed weight loss, diet    Depression, anxiety  She got a job virtually, moved out of her mother, not in a toxic relationship. Have been doing well emotionally. Sleep is better  Mother had depression. Is seeing Therapist Dr. King Mauro. Denies SI or HI.   Lexapro 20mg daily  Clonazepam prn last wrote #20 01/06/2021, she still have left over.      GERD: PPI     Smoker: was put on wellbutrin SR, \"doesn't like the way it makes me feel. \"    She have OBGYN   No longer on birth control. Not sexually active.       A comprehensive review of systems was negative except for that written in the HPI. Assessment & Plan:   Diagnoses and all orders for this visit:    1. Type 2 diabetes mellitus with hyperglycemia, without long-term current use of insulin (HCC)  -     Insulin Needles, Disposable, (Reli[employer] Pen Needles) 32 gauge x 5/32\" ndle; Use twice daily with insulin  -     insulin aspart protamine/insulin aspart (NOVOLOG MIX 70/30) 100 unit/mL (70-30) inpn; 25u SQ w breakfast and 15u SQ with dinner  -     METABOLIC PANEL, COMPREHENSIVE; Future  -     HEMOGLOBIN A1C WITH EAG; Future  -     TSH 3RD GENERATION; Future    2. Essential hypertension  -     hydroCHLOROthiazide (HYDRODIURIL) 12.5 mg tablet; TAKE 1 TABLET BY MOUTH EVERY DAY  -     METABOLIC PANEL, COMPREHENSIVE; Future  -     TSH 3RD GENERATION; Future    3. Depression with anxiety  -     escitalopram oxalate (LEXAPRO) 20 mg tablet; Take 1 Tablet by mouth daily.  -     METABOLIC PANEL, COMPREHENSIVE; Future  -     TSH 3RD GENERATION; Future    4. Severe obesity (BMI 35.0-35.9 with comorbidity) ([address]    5. Encounter for long-term (current) use of medications  -     METABOLIC PANEL, COMPREHENSIVE; Future  -     HEMOGLOBIN A1C WITH EAG; Future  -     TSH 3RD GENERATION; Future      Follow-up and Dispositions    · Return in about 3 months (around 12/30/2021) for DM2, HTN, depression, meds, labs. 712  Subjective:   [address] Kevin Hutchinson is a 39 y.o. female who was seen for Diabetes      Prior to Admission medications    Medication Sig Start Date End Date Taking? Authorizing Provider   Insulin Needles, Disposable, (Reli[employer] Pen Needles) 32 gauge x 5/32\" ndle Use twice daily with insulin 9/30/21  Yes Amparo Staley MD   hydroCHLOROthiazide (HYDRODIURIL) 12.5 mg tablet TAKE 1 TABLET BY MOUTH EVERY DAY 9/30/21  Yes Amparo Staley MD   escitalopram oxalate (LEXAPRO) 20 mg tablet Take 1 Tablet by mouth daily. 9/30/21  Yes Tone Neal MD   insulin aspart protamine/insulin aspart (NOVOLOG MIX 70/30) 100 unit/mL (70-30) inpn 25u SQ w breakfast and 15u SQ with dinner 9/30/21  Yes Amparo Staley MD   metoprolol succinate (TOPROL-XL) 25 mg XL tablet TAKE 1 TABLET BY MOUTH EVERY DAY 9/27/21  Yes MD latoya Cormier 33 gauge misc CHECK BG TWICE A DAY 7/16/21  Yes Provider, Historical   diphenhydrAMINE (Benadryl Allergy) 25 mg tablet Take 1 Tablet by mouth every six (6) hours as needed for Itching. 7/20/21  Yes Yumiko Donald MD lancets misc Check BG BID 7/16/21  Yes Amparo Staley MD   glucose blood VI test strips (ASCENSIA AUTODISC VI, ONE TOUCH ULTRA TEST VI) strip Check BID. Brand whatever insurance pay. 7/16/21  Yes Tone Neal MD   clonazePAM (KlonoPIN) 0.5 mg tablet Take 1 Tab by mouth nightly as needed (anxiety). Max Daily Amount: 0.5 mg. 1/6/21  Yes Amparo Staley MD   omeprazole (PRILOSEC) 20 mg capsule Take 1 Cap by mouth daily. 3/10/17  Yes Amparo Staley MD   EPINEPHrine (EPIPEN) 0.3 mg/0.3 mL injection 0.3 ML BY INTRAMUSCULAR ROUTE ONCE AS NEEDED FOR ALLERGIC RESPONSE FOR UP TO 1 DOSE.   Patient not taking: Reported on 8/5/2021 7/21/21   Provider, Historical     Allergies   Allergen Reactions    Chantix [Varenicline] Nausea Only    Lisinopril Cough         Objective:   Vital Signs: (As obtained by patient/caregiver at home)  There were no vitals taken for this visit. Constitutional: [x] Appears well-developed and well-nourished [x] No apparent distress        Mental status: [x] Alert and awake  [x] Oriented to person/place/time [x] Able to follow commands      Eyes:   EOM    [x]  Normal      Sclera  [x]  Normal              Discharge [x]  None visible       HENT: [x] Normocephalic, atraumatic    [] Mouth/Throat: Mucous membranes are moist    External Ears [x] Normal      Neck: [x] No visualized mass     Pulmonary/Chest: [x] Respiratory effort normal   [x] No visualized signs of difficulty breathing or respiratory distress           Musculoskeletal:   [x] Normal gait with no signs of ataxia         [x] Normal range of motion of neck         Neurological:        [x] No Facial Asymmetry (Cranial nerve 7 motor function) (limited exam due to video visit)          [x] No gaze palsy              Skin:        [x] No significant exanthematous lesions or discoloration noted on facial skin                  Psychiatric:       [x] Normal Affect [] Abnormal -        [x] No Hallucinations      We discussed the expected course, resolution and complications of the diagnosis(es) in detail. Medication risks, benefits, costs, interactions, and alternatives were discussed as indicated. I advised her to contact the office if her condition worsens, changes or fails to improve as anticipated. She expressed understanding with the diagnosis(es) and plan. Vincenzo Durham is a 39 y.o. female being evaluated by a video visit encounter for concerns as above. A caregiver was present when appropriate.  Due to this being a TeleHealth encounter (During [facility] public health emergency), evaluation of the following organ systems was limited: Vitals/Constitutional/EENT/Resp/CV/GI//MS/Neuro/Skin/Heme-Lymph-Imm. Pursuant to the emergency declaration under the [facility]note 2[facility] waiver authority and the [employer] and [employer] Act, this Virtual  Visit was conducted, with patient's (and/or legal guardian's) consent, to reduce the patient's risk of exposure to COVID-19 and provide necessary medical care. Services were provided through a video synchronous discussion virtually to substitute for in-person clinic visit. Patient and provider were located at their individual homes.         Dax Gann MD

## 2021-12-20 DIAGNOSIS — I10 ESSENTIAL HYPERTENSION: ICD-10-CM

## 2021-12-21 RX ORDER — METOPROLOL SUCCINATE 25 MG/1
TABLET, EXTENDED RELEASE ORAL
Qty: 90 TABLET | Refills: 0 | Status: SHIPPED | OUTPATIENT
Start: 2021-12-21 | End: 2022-02-18 | Stop reason: SDUPTHER

## 2022-01-16 DIAGNOSIS — E11.65 TYPE 2 DIABETES MELLITUS WITH HYPERGLYCEMIA, WITHOUT LONG-TERM CURRENT USE OF INSULIN (HCC): ICD-10-CM

## 2022-01-17 RX ORDER — INSULIN ASPART 100 [IU]/ML
INJECTION, SUSPENSION SUBCUTANEOUS
Qty: 15 ML | Refills: 1 | Status: SHIPPED | OUTPATIENT
Start: 2022-01-17 | End: 2022-02-18 | Stop reason: SDUPTHER

## 2022-02-18 ENCOUNTER — VIRTUAL VISIT (OUTPATIENT)
Dept: FAMILY MEDICINE CLINIC | Age: 37
End: 2022-02-18
Payer: COMMERCIAL

## 2022-02-18 DIAGNOSIS — Z79.899 ENCOUNTER FOR LONG-TERM (CURRENT) USE OF MEDICATIONS: ICD-10-CM

## 2022-02-18 DIAGNOSIS — I10 ESSENTIAL HYPERTENSION: ICD-10-CM

## 2022-02-18 DIAGNOSIS — E13.9 DIABETES 1.5, MANAGED AS TYPE 1 (HCC): Primary | ICD-10-CM

## 2022-02-18 DIAGNOSIS — F41.8 DEPRESSION WITH ANXIETY: ICD-10-CM

## 2022-02-18 PROCEDURE — 99214 OFFICE O/P EST MOD 30 MIN: CPT | Performed by: FAMILY MEDICINE

## 2022-02-18 RX ORDER — ESCITALOPRAM OXALATE 20 MG/1
20 TABLET ORAL DAILY
Qty: 90 TABLET | Refills: 1 | Status: SHIPPED | OUTPATIENT
Start: 2022-02-18 | End: 2022-09-09 | Stop reason: SDUPTHER

## 2022-02-18 RX ORDER — CLONAZEPAM 0.5 MG/1
0.5 TABLET ORAL
Qty: 20 TABLET | Refills: 0 | Status: SHIPPED | OUTPATIENT
Start: 2022-02-18 | End: 2022-09-09

## 2022-02-18 RX ORDER — INSULIN ASPART 100 [IU]/ML
INJECTION, SUSPENSION SUBCUTANEOUS
Qty: 15 ML | Refills: 1 | Status: SHIPPED | OUTPATIENT
Start: 2022-02-18 | End: 2022-09-09

## 2022-02-18 RX ORDER — HYDROCHLOROTHIAZIDE 12.5 MG/1
TABLET ORAL
Qty: 90 TABLET | Refills: 1 | Status: SHIPPED | OUTPATIENT
Start: 2022-02-18 | End: 2022-08-26

## 2022-02-18 RX ORDER — METOPROLOL SUCCINATE 25 MG/1
25 TABLET, EXTENDED RELEASE ORAL DAILY
Qty: 90 TABLET | Refills: 1 | Status: SHIPPED | OUTPATIENT
Start: 2022-02-18 | End: 2022-08-26

## 2022-02-18 NOTE — PROGRESS NOTES
Chief Complaint   Patient presents with    Diabetes    Hypertension     Check meds & labs    1. Have you been to the ER, urgent care clinic since your last visit? Hospitalized since your last visit? No     2. Have you seen or consulted any other health care providers outside of the [address] [place] [address] since your last visit? Include any pap smears or colon screening.  No

## 2022-02-18 NOTE — PROGRESS NOTES
Consent: Mckenzie Ray, who was seen by synchronous (real-time) audio-video technology, and/or her healthcare decision maker, is aware that this patient-initiated, Telehealth encounter on 2/18/2022 is a billable service, with coverage as determined by her insurance carrier. She is aware that she may receive a bill and has provided verbal consent to proceed: Yes. Mckenzie Ray is a 39 y.o. female    LMP: 01/22/2022    Refuse covid vaccine  Refuse other vaccines     has a past medical history of Asthma, Depression with anxiety (1/30/2019), Diabetes 1.5, managed as type 1 ([facility] (2/18/2022), Essential hypertension (1/30/2019), Gastroesophageal reflux disease without esophagitis (3/10/2017), History of seasonal allergies, IBS (irritable bowel syndrome), Non-compliance (1/6/2020), [facility] Sciatica, right side (3/10/2017), and Smoker (2/23/2016). Labs review w pt    Diabetes since 04/2021  Diabetes A1C 6.4% 02/2022, 9.4% 07/2021, 6.6% 04/2021, 6.4% 1/2020  Again Education, diet, exercise. Risk, complication, treatment. Changed to Metformin 500mg ER BID  [address]  She's checking her bg TID.     Likely type 1 DM now  D/c Metformin  INsulin 70/30 25u in morning and evening only 15   Verbal report highest 160, lowest 110s      Weight loss counseling  Currently 220lb  Started  8 weeks ago  brkf 1 protein shake  Lunch 2 eggs and 1 piece of turkey coronel  jerkey beef stick and cheese or peanuts  Dinner smoker turkey meat string bean and hamburger  Discussed topamax and surgery  She decided to do surgery for weight lost. We discussed requirement of seeing me 3 consecutive months for letter of support    HTN: BP is good at home today.   metoprolol  Last visit we started hctz 12.5mg  Continue course     , discussed weight loss, diet    Depression, anxiety - felt burned out and took time off work since January, going back on March 7/2022  She got a job virtually, moved out of her mother, not in a toxic relationship. Have been doing well emotionally. Sleep is better  Mother had depression. Is seeing Therapist Dr. Carlos Cuello. Denies SI or HI.   Lexapro 20mg daily  Clonazepam prn last wrote #20 01/06/2021, she still have left over.      GERD: PPI     Smoker: was put on wellbutrin SR, \"doesn't like the way it makes me feel. \"    She have OBGYN   No longer on birth control. Not sexually active.       A comprehensive review of systems was negative except for that written in the HPI. Assessment & Plan:   Diagnoses and all orders for this visit:    1. Diabetes 1.5, managed as type 1 (HCC)  -     insulin aspart protamine/insulin aspart (NovoLOG Mix 70-30FlexPen U-100) 100 unit/mL (70-30) inpn; INJECT 25 UNITS SUBCUTANOUSLY W BREAKFAST AND 15 UNITS WITH DINNER    2. Essential hypertension  -     metoprolol succinate (TOPROL-XL) 25 mg XL tablet; Take 1 Tablet by mouth daily. -     hydroCHLOROthiazide (HYDRODIURIL) 12.5 mg tablet; TAKE 1 TABLET BY MOUTH EVERY DAY    3. Depression with anxiety  -     escitalopram oxalate (LEXAPRO) 20 mg tablet; Take 1 Tablet by mouth daily. -     clonazePAM (KlonoPIN) 0.5 mg tablet; Take 1 Tablet by mouth nightly as needed (anxiety). Max Daily Amount: 0.5 mg.    4. Encounter for long-term (current) use of medications      Follow-up and Dispositions    · Return for see me monthly X3mths for weight loss letter of support, 4 months for DM. 712  Subjective:   [address] Kevin Hutchinson is a 39 y.o. female who was seen for Diabetes and Hypertension      Prior to Admission medications    Medication Sig Start Date End Date Taking? Authorizing Provider   insulin aspart protamine/insulin aspart (NovoLOG Mix 70-30FlexPen U-100) 100 unit/mL (70-30) inpn INJECT 25 UNITS SUBCUTANOUSLY W BREAKFAST AND 15 UNITS WITH DINNER 2/18/22  Yes Jules Staley MD   metoprolol succinate (TOPROL-XL) 25 mg XL tablet Take 1 Tablet by mouth daily.  2/18/22  Yes Emma Gómez MD   hydroCHLOROthiazide (HYDRODIURIL) 12.5 mg tablet TAKE 1 TABLET BY MOUTH EVERY DAY 2/18/22  Yes Roberto Staley MD   escitalopram oxalate (LEXAPRO) 20 mg tablet Take 1 Tablet by mouth daily. 2/18/22  Yes Kris Quiles MD   clonazePAM (KlonoPIN) 0.5 mg tablet Take 1 Tablet by mouth nightly as needed (anxiety). Max Daily Amount: 0.5 mg. 2/18/22  Yes Roberto Staley MD   Insulin Needles, Disposable, (ReliOn Pen Needles) 32 gauge x 5/32\" ndle Use twice daily with insulin 9/30/21  Yes Roberto Staley MD   lancets 33 gauge misc CHECK BG TWICE A DAY 7/16/21  Yes Provider, Historical   diphenhydrAMINE (Benadryl Allergy) 25 mg tablet Take 1 Tablet by mouth every six (6) hours as needed for Itching. 7/20/21  Yes Jeffrey Donald MD   lancets misc Check BG BID 7/16/21  Yes Roberto Staley MD   glucose blood VI test strips (ASCENSIA AUTODISC VI, ONE TOUCH ULTRA TEST VI) strip Check BID. Brand whatever insurance pay. 7/16/21  Yes Kris Quiles MD   omeprazole (PRILOSEC) 20 mg capsule Take 1 Cap by mouth daily. 3/10/17  Yes Roberto Staley MD   EPINEPHrine (EPIPEN) 0.3 mg/0.3 mL injection 0.3 ML BY INTRAMUSCULAR ROUTE ONCE AS NEEDED FOR ALLERGIC RESPONSE FOR UP TO 1 DOSE. Patient not taking: Reported on 8/5/2021 7/21/21   Provider, Historical     Allergies   Allergen Reactions    Chantix [Varenicline] Nausea Only    Lisinopril Cough         Objective:   Vital Signs: (As obtained by patient/caregiver at home)  There were no vitals taken for this visit.      Constitutional: [x] Appears well-developed and well-nourished [x] No apparent distress        Mental status: [x] Alert and awake  [x] Oriented to person/place/time [x] Able to follow commands      Eyes:   EOM    [x]  Normal      Sclera  [x]  Normal              Discharge [x]  None visible       HENT: [x] Normocephalic, atraumatic    [] Mouth/Throat: Mucous membranes are moist    External Ears [x] Normal      Neck: [x] No visualized mass     Pulmonary/Chest: [x] Respiratory effort normal   [x] No visualized signs of difficulty breathing or respiratory distress           Musculoskeletal:   [x] Normal gait with no signs of ataxia         [x] Normal range of motion of neck         Neurological:        [x] No Facial Asymmetry (Cranial nerve 7 motor function) (limited exam due to video visit)          [x] No gaze palsy              Skin:        [x] No significant exanthematous lesions or discoloration noted on facial skin                  Psychiatric:       [x] Normal Affect [] Abnormal -        [x] No Hallucinations      We discussed the expected course, resolution and complications of the diagnosis(es) in detail. Medication risks, benefits, costs, interactions, and alternatives were discussed as indicated. I advised her to contact the office if her condition worsens, changes or fails to improve as anticipated. She expressed understanding with the diagnosis(es) and plan. Perfecto Mayorga is a 39 y.o. female being evaluated by a video visit encounter for concerns as above. A caregiver was present when appropriate. Due to this being a TeleHealth encounter (During YLIRE-50 public health emergency), evaluation of the following organ systems was limited: Vitals/Constitutional/EENT/Resp/CV/GI//MS/Neuro/Skin/Heme-Lymph-Imm. Pursuant to the emergency declaration under the [facility], [address] waiver authority and the [employer] and [employer] General Act, this Virtual  Visit was conducted, with patient's (and/or legal guardian's) consent, to reduce the patient's risk of exposure to COVID-19 and provide necessary medical care. Services were provided through a video synchronous discussion virtually to substitute for in-person clinic visit. Patient and provider were located at their individual homes.         Mallika Diaz MD

## 2022-03-11 ENCOUNTER — VIRTUAL VISIT (OUTPATIENT)
Dept: FAMILY MEDICINE CLINIC | Age: 37
End: 2022-03-11
Payer: COMMERCIAL

## 2022-03-11 DIAGNOSIS — Z71.3 WEIGHT LOSS COUNSELING, ENCOUNTER FOR: ICD-10-CM

## 2022-03-11 DIAGNOSIS — E66.01 SEVERE OBESITY (BMI 35.0-35.9 WITH COMORBIDITY) (HCC): Primary | ICD-10-CM

## 2022-03-11 PROBLEM — E11.9 TYPE 2 DIABETES MELLITUS (HCC): Status: ACTIVE | Noted: 2022-03-11

## 2022-03-11 PROCEDURE — 99213 OFFICE O/P EST LOW 20 MIN: CPT | Performed by: FAMILY MEDICINE

## 2022-03-11 NOTE — PROGRESS NOTES
Chief Complaint   Patient presents with    Weight Management     1 mo check    1. Have you been to the ER, urgent care clinic since your last visit? Hospitalized since your last visit? No     2. Have you seen or consulted any other health care providers outside of the [address] [place] [address] since your last visit? Include any pap smears or colon screening.  No

## 2022-03-11 NOTE — PROGRESS NOTES
Consent: Janine Gonzalez, who was seen by synchronous (real-time) audio-video technology, and/or her healthcare decision maker, is aware that this patient-initiated, Telehealth encounter on 3/11/2022 is a billable service, with coverage as determined by her insurance carrier. She is aware that she may receive a bill and has provided verbal consent to proceed: Yes. Janine Gonzalez is a 39 y.o. female    LMP: 01/22/2022    Refuse covid vaccine  Refuse other vaccines     has a past medical history of Asthma, Depression with anxiety (1/30/2019), Diabetes 1.5, managed as type 1 ([facility] [address] (2/18/2022), Essential hypertension (1/30/2019), Gastroesophageal reflux disease without esophagitis (3/10/2017), History of seasonal allergies, IBS (irritable bowel syndrome), Non-compliance (1/6/2020), Psychotic disorder ([facility] [facility] Sciatica, right side (3/10/2017), and Smoker (2/23/2016). Weight loss counseling  Currently 218lb  Started  12 weeks ago  brkf 1 protein shake  Lunch 2 eggs and 1 piece of turkey coronel  jerkey beef stick and cheese or peanuts  Dinner smoker turkey meat string bean and hamburger  Discussed topamax and surgery  She decided to do surgery for weight lost. We discussed requirement of seeing me 3 consecutive months for letter of support    Her insurance requirement  Have to see me only once now and get a letter of support  Have to see dietitian only once now  Sleep study  egd  psych    A comprehensive review of systems was negative except for that written in the HPI. Assessment & Plan:   Diagnoses and all orders for this visit:    1. Severe obesity (BMI 35.0-35.9 with comorbidity) ([facility]    2. Weight loss counseling, encounter for      Follow-up and Dispositions    · Return for 2 weeks after weight loss surgery to manage her diabetes and insulin.        712  Subjective:   Janine Gonzalez is a 39 y.o. female who was seen for Weight Management      Prior to Admission medications    Medication Sig Start Date End Date Taking? Authorizing Provider   insulin aspart protamine/insulin aspart (NovoLOG Mix 70-30FlexPen U-100) 100 unit/mL (70-30) inpn INJECT 25 UNITS SUBCUTANOUSLY W BREAKFAST AND 15 UNITS WITH DINNER 2/18/22  Yes Noris Staley MD   metoprolol succinate (TOPROL-XL) 25 mg XL tablet Take 1 Tablet by mouth daily. 2/18/22  Yes Ward Otto MD   hydroCHLOROthiazide (HYDRODIURIL) 12.5 mg tablet TAKE 1 TABLET BY MOUTH EVERY DAY 2/18/22  Yes Ward Otto MD   escitalopram oxalate (LEXAPRO) 20 mg tablet Take 1 Tablet by mouth daily. 2/18/22  Yes Ward Otto MD   clonazePAM (KlonoPIN) 0.5 mg tablet Take 1 Tablet by mouth nightly as needed (anxiety). Max Daily Amount: 0.5 mg. 2/18/22  Yes Noris Staley MD   Insulin Needles, Disposable, (ReliOn Pen Needles) 32 gauge x 5/32\" ndle Use twice daily with insulin 9/30/21  Yes Noris Staley MD   lancets 33 gauge misc CHECK BG TWICE A DAY 7/16/21  Yes Provider, Historical   diphenhydrAMINE (Benadryl Allergy) 25 mg tablet Take 1 Tablet by mouth every six (6) hours as needed for Itching. 7/20/21  Yes Anthony Donald MD   lancets misc Check BG BID 7/16/21  Yes Noris Staley MD   glucose blood VI test strips (ASCENSIA AUTODISC VI, ONE TOUCH ULTRA TEST VI) strip Check BID. Brand whatever insurance pay. 7/16/21  Yes Ward Otto MD   omeprazole (PRILOSEC) 20 mg capsule Take 1 Cap by mouth daily. 3/10/17  Yes Noris Staley MD   EPINEPHrine (EPIPEN) 0.3 mg/0.3 mL injection 0.3 ML BY INTRAMUSCULAR ROUTE ONCE AS NEEDED FOR ALLERGIC RESPONSE FOR UP TO 1 DOSE. Patient not taking: Reported on 8/5/2021 7/21/21   Provider, Historical     Allergies   Allergen Reactions    Chantix [Varenicline] Nausea Only    Lisinopril Cough         Objective:   Vital Signs: (As obtained by patient/caregiver at home)  There were no vitals taken for this visit.      Constitutional: [x] Appears well-developed and well-nourished [x] No apparent distress        Mental status: [x] Alert and awake [x] Oriented to person/place/time [x] Able to follow commands      Eyes:   EOM    [x]  Normal      Sclera  [x]  Normal              Discharge [x]  None visible       HENT: [x] Normocephalic, atraumatic    [] Mouth/Throat: Mucous membranes are moist    External Ears [x] Normal      Neck: [x] No visualized mass     Pulmonary/Chest: [x] Respiratory effort normal   [x] No visualized signs of difficulty breathing or respiratory distress           Musculoskeletal:   [x] Normal gait with no signs of ataxia         [x] Normal range of motion of neck         Neurological:        [x] No Facial Asymmetry (Cranial nerve 7 motor function) (limited exam due to video visit)          [x] No gaze palsy              Skin:        [x] No significant exanthematous lesions or discoloration noted on facial skin                  Psychiatric:       [x] Normal Affect [] Abnormal -        [x] No Hallucinations      We discussed the expected course, resolution and complications of the diagnosis(es) in detail. Medication risks, benefits, costs, interactions, and alternatives were discussed as indicated. I advised her to contact the office if her condition worsens, changes or fails to improve as anticipated. She expressed understanding with the diagnosis(es) and plan. Judy Barney is a 39 y.o. female being evaluated by a video visit encounter for concerns as above. A caregiver was present when appropriate. Due to this being a TeleHealth encounter (During UDKGT-05 public health emergency), evaluation of the following organ systems was limited: Vitals/Constitutional/EENT/Resp/CV/GI//MS/Neuro/Skin/Heme-Lymph-Imm.   Pursuant to the emergency declaration under the [address] [place] [address] and the [employer] and [employer] Act, this Virtual  Visit was conducted, with patient's (and/or legal guardian's) consent, to reduce the patient's risk of exposure to COVID-19 and provide necessary medical care. Services were provided through a video synchronous discussion virtually to substitute for in-person clinic visit. Patient and provider were located at their individual homes.         Twan Vergara MD

## 2022-03-18 PROBLEM — E11.9 TYPE 2 DIABETES MELLITUS (HCC): Status: ACTIVE | Noted: 2022-03-11

## 2022-03-18 PROBLEM — K90.49 DAIRY PRODUCT INTOLERANCE: Status: ACTIVE | Noted: 2017-03-10

## 2022-03-18 PROBLEM — E13.9 DIABETES 1.5, MANAGED AS TYPE 1 (HCC): Status: ACTIVE | Noted: 2022-02-18

## 2022-03-18 PROBLEM — Z91.199 NON-COMPLIANCE: Status: ACTIVE | Noted: 2020-01-06

## 2022-03-19 PROBLEM — F41.8 DEPRESSION WITH ANXIETY: Status: ACTIVE | Noted: 2019-01-30

## 2022-03-19 PROBLEM — K21.9 GASTROESOPHAGEAL REFLUX DISEASE WITHOUT ESOPHAGITIS: Status: ACTIVE | Noted: 2017-03-10

## 2022-03-19 PROBLEM — M54.31 SCIATICA, RIGHT SIDE: Status: ACTIVE | Noted: 2017-03-10

## 2022-03-19 PROBLEM — E66.01 SEVERE OBESITY (HCC): Status: ACTIVE | Noted: 2019-02-27

## 2022-03-19 PROBLEM — I10 ESSENTIAL HYPERTENSION: Status: ACTIVE | Noted: 2019-01-30

## 2022-03-19 PROBLEM — K52.9 CHRONIC DIARRHEA OF UNKNOWN ORIGIN: Status: ACTIVE | Noted: 2017-03-10

## 2022-03-20 PROBLEM — F33.0 DEPRESSION, MAJOR, RECURRENT, MILD (HCC): Status: ACTIVE | Noted: 2020-01-06

## 2022-03-29 ENCOUNTER — DOCUMENTATION ONLY (OUTPATIENT)
Dept: FAMILY MEDICINE CLINIC | Age: 37
End: 2022-03-29

## 2022-03-29 NOTE — PROGRESS NOTES
Request for notes & labs from [facility] for upcoming appt with Dr Garrick Orr on 03/31/22 faxed to [phone]

## 2022-05-06 ENCOUNTER — HOSPITAL ENCOUNTER (EMERGENCY)
Age: 37
Discharge: HOME | End: 2022-05-07
Attending: EMERGENCY MEDICINE
Payer: COMMERCIAL

## 2022-05-06 ENCOUNTER — APPOINTMENT (OUTPATIENT)
Dept: ULTRASOUND IMAGING | Age: 37
End: 2022-05-06
Attending: STUDENT IN AN ORGANIZED HEALTH CARE EDUCATION/TRAINING PROGRAM
Payer: COMMERCIAL

## 2022-05-06 VITALS
BODY MASS INDEX: 41.41 KG/M2 | RESPIRATION RATE: 18 BRPM | SYSTOLIC BLOOD PRESSURE: 135 MMHG | DIASTOLIC BLOOD PRESSURE: 89 MMHG | WEIGHT: 233.69 LBS | HEART RATE: 83 BPM | HEIGHT: 63 IN | OXYGEN SATURATION: 97 % | TEMPERATURE: 97.9 F

## 2022-05-06 DIAGNOSIS — R11.2 NAUSEA AND VOMITING, UNSPECIFIED VOMITING TYPE: ICD-10-CM

## 2022-05-06 DIAGNOSIS — R10.11 ABDOMINAL PAIN, RIGHT UPPER QUADRANT: Primary | ICD-10-CM

## 2022-05-06 LAB
ALBUMIN SERPL-MCNC: 3.9 G/DL (ref 3.5–5)
ALBUMIN/GLOB SERPL: 0.9 {RATIO} (ref 1.1–2.2)
ALP SERPL-CCNC: 81 U/L (ref 45–117)
ALT SERPL-CCNC: 31 U/L (ref 12–78)
ANION GAP SERPL CALC-SCNC: 7 MMOL/L (ref 5–15)
APPEARANCE UR: CLEAR
AST SERPL-CCNC: 20 U/L (ref 15–37)
BACTERIA URNS QL MICRO: ABNORMAL /HPF
BASOPHILS # BLD: 0.1 K/UL (ref 0–0.1)
BASOPHILS NFR BLD: 1 % (ref 0–1)
BILIRUB SERPL-MCNC: 0.6 MG/DL (ref 0.2–1)
BILIRUB UR QL CFM: NEGATIVE
BUN SERPL-MCNC: 9 MG/DL (ref 6–20)
BUN/CREAT SERPL: 13 (ref 12–20)
CALCIUM SERPL-MCNC: 9.5 MG/DL (ref 8.5–10.1)
CHLORIDE SERPL-SCNC: 102 MMOL/L (ref 97–108)
CO2 SERPL-SCNC: 26 MMOL/L (ref 21–32)
COLOR UR: ABNORMAL
CREAT SERPL-MCNC: 0.72 MG/DL (ref 0.55–1.02)
DIFFERENTIAL METHOD BLD: NORMAL
EOSINOPHIL # BLD: 0.1 K/UL (ref 0–0.4)
EOSINOPHIL NFR BLD: 2 % (ref 0–7)
EPITH CASTS URNS QL MICRO: ABNORMAL /LPF
ERYTHROCYTE [DISTWIDTH] IN BLOOD BY AUTOMATED COUNT: 13.9 % (ref 11.5–14.5)
GLOBULIN SER CALC-MCNC: 4.3 G/DL (ref 2–4)
GLUCOSE SERPL-MCNC: 108 MG/DL (ref 65–100)
GLUCOSE UR STRIP.AUTO-MCNC: NEGATIVE MG/DL
HCG UR QL: NEGATIVE
HCT VFR BLD AUTO: 41 % (ref 35–47)
HGB BLD-MCNC: 14 G/DL (ref 11.5–16)
HGB UR QL STRIP: ABNORMAL
HYALINE CASTS URNS QL MICRO: ABNORMAL /LPF (ref 0–2)
IMM GRANULOCYTES # BLD AUTO: 0 K/UL (ref 0–0.04)
IMM GRANULOCYTES NFR BLD AUTO: 0 % (ref 0–0.5)
KETONES UR QL STRIP.AUTO: 80 MG/DL
LEUKOCYTE ESTERASE UR QL STRIP.AUTO: NEGATIVE
LIPASE SERPL-CCNC: 122 U/L (ref 73–393)
LYMPHOCYTES # BLD: 2.6 K/UL (ref 0.8–3.5)
LYMPHOCYTES NFR BLD: 32 % (ref 12–49)
MCH RBC QN AUTO: 30.7 PG (ref 26–34)
MCHC RBC AUTO-ENTMCNC: 34.1 G/DL (ref 30–36.5)
MCV RBC AUTO: 89.9 FL (ref 80–99)
MONOCYTES # BLD: 0.7 K/UL (ref 0–1)
MONOCYTES NFR BLD: 9 % (ref 5–13)
NEUTS SEG # BLD: 4.5 K/UL (ref 1.8–8)
NEUTS SEG NFR BLD: 56 % (ref 32–75)
NITRITE UR QL STRIP.AUTO: NEGATIVE
NRBC # BLD: 0 K/UL (ref 0–0.01)
NRBC BLD-RTO: 0 PER 100 WBC
PH UR STRIP: 5.5 [PH] (ref 5–8)
PLATELET # BLD AUTO: 236 K/UL (ref 150–400)
PMV BLD AUTO: 9.9 FL (ref 8.9–12.9)
POTASSIUM SERPL-SCNC: 3.6 MMOL/L (ref 3.5–5.1)
PROT SERPL-MCNC: 8.2 G/DL (ref 6.4–8.2)
PROT UR STRIP-MCNC: 30 MG/DL
RBC # BLD AUTO: 4.56 M/UL (ref 3.8–5.2)
RBC #/AREA URNS HPF: ABNORMAL /HPF (ref 0–5)
SODIUM SERPL-SCNC: 135 MMOL/L (ref 136–145)
SP GR UR REFRACTOMETRY: >1.03 (ref 1–1.03)
UA: UC IF INDICATED,UAUC: ABNORMAL
UROBILINOGEN UR QL STRIP.AUTO: 1 EU/DL (ref 0.2–1)
WBC # BLD AUTO: 8 K/UL (ref 3.6–11)
WBC URNS QL MICRO: ABNORMAL /HPF (ref 0–4)

## 2022-05-06 PROCEDURE — 36415 COLL VENOUS BLD VENIPUNCTURE: CPT

## 2022-05-06 PROCEDURE — 81001 URINALYSIS AUTO W/SCOPE: CPT

## 2022-05-06 PROCEDURE — 76705 ECHO EXAM OF ABDOMEN: CPT

## 2022-05-06 PROCEDURE — 96374 THER/PROPH/DIAG INJ IV PUSH: CPT

## 2022-05-06 PROCEDURE — 83690 ASSAY OF LIPASE: CPT

## 2022-05-06 PROCEDURE — 96375 TX/PRO/DX INJ NEW DRUG ADDON: CPT

## 2022-05-06 PROCEDURE — 99284 EMERGENCY DEPT VISIT MOD MDM: CPT

## 2022-05-06 PROCEDURE — 74011250636 HC RX REV CODE- 250/636: Performed by: STUDENT IN AN ORGANIZED HEALTH CARE EDUCATION/TRAINING PROGRAM

## 2022-05-06 PROCEDURE — 80053 COMPREHEN METABOLIC PANEL: CPT

## 2022-05-06 PROCEDURE — 85025 COMPLETE CBC W/AUTO DIFF WBC: CPT

## 2022-05-06 PROCEDURE — 81025 URINE PREGNANCY TEST: CPT

## 2022-05-06 RX ORDER — ONDANSETRON 2 MG/ML
4 INJECTION INTRAMUSCULAR; INTRAVENOUS ONCE
Status: COMPLETED | OUTPATIENT
Start: 2022-05-06 | End: 2022-05-06

## 2022-05-06 RX ORDER — ONDANSETRON 4 MG/1
4 TABLET, ORALLY DISINTEGRATING ORAL
Qty: 10 TABLET | Refills: 0 | Status: SHIPPED | OUTPATIENT
Start: 2022-05-06 | End: 2022-05-09

## 2022-05-06 RX ORDER — MORPHINE SULFATE 2 MG/ML
4 INJECTION, SOLUTION INTRAMUSCULAR; INTRAVENOUS ONCE
Status: COMPLETED | OUTPATIENT
Start: 2022-05-06 | End: 2022-05-06

## 2022-05-06 RX ADMIN — ONDANSETRON 4 MG: 2 INJECTION INTRAMUSCULAR; INTRAVENOUS at 22:17

## 2022-05-06 RX ADMIN — MORPHINE SULFATE 4 MG: 2 INJECTION, SOLUTION INTRAMUSCULAR; INTRAVENOUS at 22:20

## 2022-05-07 NOTE — ED PROVIDER NOTES
EMERGENCY DEPARTMENT HISTORY AND PHYSICAL EXAM          Date: 5/6/2022  Patient Name: Lidia Abrams  Attending of Record: Celeste Davis MD    History of Presenting Illness     Chief Complaint   Patient presents with    Abdominal Pain     pt presents with c/o RUQ abdominal pain ongoing x 4 days, pt seen and treated for pancreatitis one year ago, pt has pmh of type 1 diabetes. Pt states that sugars have been running normal at home. VS stable, aferbile intriage. History Provided By: Patient    HPI: Lidia Abrams is a 39 y.o. female with a history of GERD, depression and anxiety, hypertension, diabetes, asthma, acute pancreatitis last year. She presents the emergency department with 4 days of progressive right upper quadrant and epigastric pain that radiates to her back. Intermittent and worse after eating. No fevers. Does have nausea and vomiting. No history of abdominal surgeries. PCP: Tone Neal MD    There are no other complaints, changes, or physical findings at this time. Current Facility-Administered Medications   Medication Dose Route Frequency Provider Last Rate Last Admin    morphine injection 4 mg  4 mg IntraVENous Yovany Wing MD        ondansetron [facility]) injection 4 mg  4 mg IntraVENous Yovany Wing MD         Current Outpatient Medications   Medication Sig Dispense Refill    insulin aspart protamine/insulin aspart (NovoLOG Mix 70-30FlexPen U-100) 100 unit/mL (70-30) inpn INJECT 25 UNITS SUBCUTANOUSLY W BREAKFAST AND 15 UNITS WITH DINNER 15 mL 1    metoprolol succinate (TOPROL-XL) 25 mg XL tablet Take 1 Tablet by mouth daily. 90 Tablet 1    hydroCHLOROthiazide (HYDRODIURIL) 12.5 mg tablet TAKE 1 TABLET BY MOUTH EVERY DAY 90 Tablet 1    escitalopram oxalate (LEXAPRO) 20 mg tablet Take 1 Tablet by mouth daily. 90 Tablet 1    clonazePAM (KlonoPIN) 0.5 mg tablet Take 1 Tablet by mouth nightly as needed (anxiety).  Max Daily Amount: 0.5 mg. 20 Tablet 0  Insulin Needles, Disposable, (ReliOn Pen Needles) 32 gauge x 5/32\" ndle Use twice daily with insulin 200 Pen Needle 1    EPINEPHrine (EPIPEN) 0.3 mg/0.3 mL injection 0.3 ML BY INTRAMUSCULAR ROUTE ONCE AS NEEDED FOR ALLERGIC RESPONSE FOR UP TO 1 DOSE. (Patient not taking: Reported on 8/5/2021)      lancets 33 gauge misc CHECK BG TWICE A DAY      diphenhydrAMINE (Benadryl Allergy) 25 mg tablet Take 1 Tablet by mouth every six (6) hours as needed for Itching. 20 Tablet 0    lancets misc Check BG BID 50 Each 5    glucose blood VI test strips (ASCENSIA AUTODISC VI, ONE TOUCH ULTRA TEST VI) strip Check BID. Brand whatever insurance pay. 50 Strip 2    omeprazole (PRILOSEC) 20 mg capsule Take 1 Cap by mouth daily. 30 Cap 2       Past History     Past Medical History:  Past Medical History:   Diagnosis Date    Asthma     No problems as adult or teenager--last problems as child    Depression with anxiety 1/30/2019    Diabetes 1.5, managed as type 1 ([facility] [address] 2/18/2022    Essential hypertension 1/30/2019    Gastroesophageal reflux disease without esophagitis 3/10/2017    History of seasonal allergies     Allegra or Claritin--equally effective.  IBS (irritable bowel syndrome)     Non-compliance 1/6/2020    Psychotic disorder ([facility] [address]     Sciatica, right side 3/10/2017    Smoker 2/23/2016       There are no active hospital problems to display for this patient.        Past Surgical History:  Past Surgical History:   Procedure Laterality Date    PAP SMEAR  10/11       Family History:  Family History   Problem Relation Age of Onset    Diabetes Mother     No Known Problems Father     Hypertension Maternal Aunt     Heart Disease Maternal Grandmother         CAD    Diabetes Maternal Grandmother     Hypertension Maternal Grandmother     No Known Problems Sister        Social History:  Social History     Tobacco Use    Smoking status: Current Some Day Smoker     Packs/day: 0.25     Years: 20.00     Pack years: 5.00     Types: Cigarettes    Smokeless tobacco: Never Used    Tobacco comment: Smokes when she drinks   Vaping Use    Vaping Use: Never used   Substance Use Topics    Alcohol use: Yes     Comment: on the weekends    Drug use: No       Allergies: Allergies   Allergen Reactions    Chantix [Varenicline] Nausea Only    Lisinopril Cough         Review of Systems   Review of Systems   Constitutional: Negative for fever. HENT: Negative for congestion. Eyes: Negative for redness. Respiratory: Negative for shortness of breath. Cardiovascular: Negative for chest pain. Gastrointestinal: Positive for abdominal pain, nausea and vomiting. Endocrine: Negative for polyuria. Genitourinary: Negative for difficulty urinating. Musculoskeletal: Negative for back pain. Skin: Negative for rash. Allergic/Immunologic: Negative for immunocompromised state. Neurological: Negative for speech difficulty. Psychiatric/Behavioral: Negative for agitation. All other systems reviewed and are negative. Physical Exam     Physical Exam  Vitals and nursing note reviewed. Constitutional:       General: She is not in acute distress. Appearance: She is obese. HENT:      Head: Normocephalic and atraumatic. Right Ear: External ear normal.      Left Ear: External ear normal.      Nose: Nose normal. No congestion. Mouth/Throat:      Mouth: Mucous membranes are moist.      Pharynx: Oropharynx is clear. Eyes:      General: No scleral icterus. Extraocular Movements: Extraocular movements intact. Conjunctiva/sclera: Conjunctivae normal.   Cardiovascular:      Rate and Rhythm: Normal rate and regular rhythm. Pulmonary:      Effort: Pulmonary effort is normal. No respiratory distress. Abdominal:      General: There is no distension. Comments: Obese, soft, no rigidity or guarding. Significant right upper quadrant and epigastric tenderness with palpation positive Archibald sign. Musculoskeletal:         General: No swelling, deformity or signs of injury. Normal range of motion. Skin:     General: Skin is warm and dry. Capillary Refill: Capillary refill takes less than 2 seconds. Neurological:      Mental Status: She is alert and oriented to person, place, and time. Mental status is at baseline. Psychiatric:         Mood and Affect: Mood normal.         Behavior: Behavior normal.          Diagnostic Study Results     Labs -     Recent Results (from the past 12 hour(s))   CBC WITH AUTOMATED DIFF    Collection Time: 05/06/22  8:34 PM   Result Value Ref Range    WBC 8.0 3.6 - 11.0 K/uL    RBC 4.56 3.80 - 5.20 M/uL    HGB 14.0 11.5 - 16.0 g/dL    HCT 41.0 35.0 - 47.0 %    MCV 89.9 80.0 - 99.0 FL    MCH 30.7 26.0 - 34.0 PG    MCHC 34.1 30.0 - 36.5 g/dL    RDW 13.9 11.5 - 14.5 %    PLATELET 600 260 - 237 K/uL    MPV 9.9 8.9 - 12.9 FL    NRBC 0.0 0  WBC    ABSOLUTE NRBC 0.00 0.00 - 0.01 K/uL    NEUTROPHILS 56 32 - 75 %    LYMPHOCYTES 32 12 - 49 %    MONOCYTES 9 5 - 13 %    EOSINOPHILS 2 0 - 7 %    BASOPHILS 1 0 - 1 %    IMMATURE GRANULOCYTES 0 0.0 - 0.5 %    ABS. NEUTROPHILS 4.5 1.8 - 8.0 K/UL    ABS. LYMPHOCYTES 2.6 0.8 - 3.5 K/UL    ABS. MONOCYTES 0.7 0.0 - 1.0 K/UL    ABS. EOSINOPHILS 0.1 0.0 - 0.4 K/UL    ABS. BASOPHILS 0.1 0.0 - 0.1 K/UL    ABS. IMM.  GRANS. 0.0 0.00 - 0.04 K/UL    DF AUTOMATED     METABOLIC PANEL, COMPREHENSIVE    Collection Time: 05/06/22  8:34 PM   Result Value Ref Range    Sodium 135 (L) 136 - 145 mmol/L    Potassium 3.6 3.5 - 5.1 mmol/L    Chloride 102 97 - 108 mmol/L    CO2 26 21 - 32 mmol/L    Anion gap 7 5 - 15 mmol/L    Glucose 108 (H) 65 - 100 mg/dL    BUN 9 6 - 20 MG/DL    Creatinine 0.72 0.55 - 1.02 MG/DL    BUN/Creatinine ratio 13 12 - 20      GFR est AA >60 >60 ml/min/1.73m2    GFR est non-AA >60 >60 ml/min/1.73m2    Calcium 9.5 8.5 - 10.1 MG/DL    Bilirubin, total 0.6 0.2 - 1.0 MG/DL    ALT (SGPT) 31 12 - 78 U/L    AST (SGOT) 20 15 - 37 U/L Alk. phosphatase 81 45 - 117 U/L    Protein, total 8.2 6.4 - 8.2 g/dL    Albumin 3.9 3.5 - 5.0 g/dL    Globulin 4.3 (H) 2.0 - 4.0 g/dL    A-G Ratio 0.9 (L) 1.1 - 2.2     LIPASE    Collection Time: 05/06/22  8:34 PM   Result Value Ref Range    Lipase 122 73 - 393 U/L   URINALYSIS W/ REFLEX CULTURE    Collection Time: 05/06/22  8:51 PM    Specimen: Urine    Urine specimen   Result Value Ref Range    Color DARK YELLOW      Appearance CLEAR CLEAR      Specific gravity >1.030 (H) 1.003 - 1.030    pH (UA) 5.5 5.0 - 8.0      Protein 30 (A) NEG mg/dL    Glucose Negative NEG mg/dL    Ketone 80 (A) NEG mg/dL    Blood SMALL (A) NEG      Urobilinogen 1.0 0.2 - 1.0 EU/dL    Nitrites Negative NEG      Leukocyte Esterase Negative NEG      UA:UC IF INDICATED CULTURE NOT INDICATED BY UA RESULT      WBC 0-4 0 - 4 /hpf    RBC 5-10 0 - 5 /hpf    Epithelial cells MANY (A) FEW /lpf    Bacteria 2+ (A) NEG /hpf    Hyaline cast 0-2 0 - 2 /lpf   BILIRUBIN, CONFIRM    Collection Time: 05/06/22  8:51 PM   Result Value Ref Range    Bilirubin UA, confirm Negative     HCG URINE, QL. - POC    Collection Time: 05/06/22  8:58 PM   Result Value Ref Range    Pregnancy test,urine (POC) Negative NEG         Radiologic Studies -   [facility]    (Results Pending)     CT Results  (Last 48 hours)    None        CXR Results  (Last 48 hours)    None            Medical Decision Making   I am the first provider for this patient. I reviewed the vital signs, available nursing notes, past medical history, past surgical history, family history and social history. Vital Signs-Reviewed the patient's vital signs.   Patient Vitals for the past 12 hrs:   Temp Pulse Resp BP SpO2   05/06/22 2051 97.9 °F (36.6 °C) 83 18 135/89 97 %   05/06/22 2028 98.9 °F (37.2 °C) 89 20 (!) 141/105 98 %           Records Reviewed: Nursing Notes and Old Medical Records    Provider Notes (Medical Decision Making):     DDx: Symptomatic cholelithiasis, cholecystitis, pancreatitis    Assesment/Plan/MDM: Patient with 4 days of right upper quadrant pain and significant right upper quadrant tenderness on exam.  No fever white count or laboratory evidence of biliary stasis or obstruction. Lipase normal.  Feel it is likely symptomatic cholelithiasis versus mild cholecystitis. Will obtain right upper quadrant ultrasound. Alternatively could be pancreatitis, with normal lipase and history of similar, potential for chronic pancreatitis. Will consider CT abdomen to evaluate for evidence of chronic pancreatitis if right upper quadrant ultrasound is negative. We will continue to monitor. Dispo pending work-up. ED Course and Progress Notes:   Initial assessment performed. The patients presenting problems have been discussed, and they are in agreement with the care plan formulated and outlined with them. I have encouraged them to ask questions as they arise throughout their visit. ED Course as of 05/07/22 0041   Fri May 06, 2022   2348 Right upper quadrant ultrasound negative except for some hepatic steatosis which I informed the patient about. Patient is feeling much better after some fluids and Zofran. Discussed the option of proceeding with a CT scan and she would rather go home with Zofran and return if worse. Feel this is entirely reasonable. Will discharge now.  [PU]      ED Course User Index  [PU] Nithya Tierney MD           Diagnosis     Clinical Impression: Nausea vomiting, abdominal pain    Disposition:  Discharge    DISCHARGE PLAN:    -As needed Zofran and return precautions  -Follow up with PCP  -Return to ED if worse         Resident Signature:     Rebecca Ovalle MD  [facility] EM Resident PGY-2

## 2022-05-07 NOTE — DISCHARGE INSTRUCTIONS
You were seen in the emergency department with abdominal pain nausea and vomiting. Your ultrasound of the gallbladder was unremarkable. You feel better after fluids and nausea medication. We discussed the option of getting a CT scan to evaluate further but you preferred to go home with nausea medication and come back if worse. I feel this is an entirely reasonable plan. Please follow-up with your PCP next week regarding the symptoms. Return to the emergency department if develop worsening symptoms or you are otherwise concerned.

## 2022-05-07 NOTE — ED TRIAGE NOTES
.  Chief Complaint   Patient presents with    Abdominal Pain     pt presents with c/o RUQ abdominal pain ongoing x 4 days, pt seen and treated for pancreatitis one year ago, pt has pmh of type 1 diabetes. Pt states that sugars have been running normal at home. VS stable, aferbile intriage.

## 2022-08-26 DIAGNOSIS — I10 ESSENTIAL HYPERTENSION: ICD-10-CM

## 2022-08-26 RX ORDER — METOPROLOL SUCCINATE 25 MG/1
TABLET, EXTENDED RELEASE ORAL
Qty: 30 TABLET | Refills: 0 | Status: SHIPPED | OUTPATIENT
Start: 2022-08-26 | End: 2022-09-09

## 2022-08-26 RX ORDER — HYDROCHLOROTHIAZIDE 12.5 MG/1
TABLET ORAL
Qty: 30 TABLET | Refills: 0 | Status: SHIPPED | OUTPATIENT
Start: 2022-08-26 | End: 2022-09-09

## 2022-08-26 NOTE — TELEPHONE ENCOUNTER
Saw her in Feb for chronic medical issues. Was supposed to f/up in a couple of weeks for her diabetes as well. It's now almost 7 months.      Med 1 month and needs f/up apt    Th    Sarah Beth Garcia MD  8/26/2022

## 2022-09-09 ENCOUNTER — VIRTUAL VISIT (OUTPATIENT)
Dept: FAMILY MEDICINE CLINIC | Age: 37
End: 2022-09-09
Payer: COMMERCIAL

## 2022-09-09 ENCOUNTER — DOCUMENTATION ONLY (OUTPATIENT)
Dept: FAMILY MEDICINE CLINIC | Age: 37
End: 2022-09-09

## 2022-09-09 DIAGNOSIS — Z79.899 ENCOUNTER FOR LONG-TERM (CURRENT) USE OF MEDICATIONS: ICD-10-CM

## 2022-09-09 DIAGNOSIS — O10.011 PRE-EXISTING ESSENTIAL HTN COMP PREGNANCY, FIRST TRIMESTER: ICD-10-CM

## 2022-09-09 DIAGNOSIS — Z3A.01 7 WEEKS GESTATION OF PREGNANCY: Primary | ICD-10-CM

## 2022-09-09 DIAGNOSIS — F41.8 DEPRESSION WITH ANXIETY: ICD-10-CM

## 2022-09-09 DIAGNOSIS — E13.9 DIABETES 1.5, MANAGED AS TYPE 1 (HCC): ICD-10-CM

## 2022-09-09 PROCEDURE — 99214 OFFICE O/P EST MOD 30 MIN: CPT | Performed by: FAMILY MEDICINE

## 2022-09-09 PROCEDURE — 3044F HG A1C LEVEL LT 7.0%: CPT | Performed by: FAMILY MEDICINE

## 2022-09-09 RX ORDER — LABETALOL 100 MG/1
100 TABLET, FILM COATED ORAL 2 TIMES DAILY
Qty: 60 TABLET | Refills: 1 | Status: SHIPPED | OUTPATIENT
Start: 2022-09-09 | End: 2022-09-26 | Stop reason: SDUPTHER

## 2022-09-09 RX ORDER — ESCITALOPRAM OXALATE 20 MG/1
20 TABLET ORAL DAILY
Qty: 90 TABLET | Refills: 1 | Status: SHIPPED | OUTPATIENT
Start: 2022-09-09

## 2022-09-09 RX ORDER — INSULIN LISPRO 100 [IU]/ML
INJECTION, SOLUTION INTRAVENOUS; SUBCUTANEOUS
Qty: 5 ADJUSTABLE DOSE PRE-FILLED PEN SYRINGE | Refills: 0 | Status: SHIPPED | OUTPATIENT
Start: 2022-09-09 | End: 2022-09-28 | Stop reason: ALTCHOICE

## 2022-09-09 NOTE — PROGRESS NOTES
LM for patient to call & schedule 4 week follow up appt. And where to go to get labs drawn & referral info. Also mailed AVS, lab orders & referral info to patient.

## 2022-09-09 NOTE — PROGRESS NOTES
Consent: Sabina Vidal, who was seen by synchronous (real-time) audio-video technology, and/or her healthcare decision maker, is aware that this patient-initiated, Telehealth encounter on 2022 is a billable service, with coverage as determined by her insurance carrier. She is aware that she may receive a bill and has provided verbal consent to proceed: Yes. Sabina Vidal is a 40 y.o. female    I last saw her > 6 months ago    Refuses covid vaccine  Refuses other vaccines    LMP:   Just found out pregnant 2022  Have 1st OBGYN apt 2022  Estimated 7wks gestational       has a past medical history of Asthma, Depression with anxiety (2019), Diabetes 1.5, managed as type 1 ([facility] [address] (2022), Essential hypertension (2019), Gastroesophageal reflux disease without esophagitis (3/10/2017), History of seasonal allergies, IBS (irritable bowel syndrome), Non-compliance (2020), Psychotic disorder ([facility] [address] Sciatica, right side (3/10/2017), and Smoker (2016). Labs review w pt    Diabetes since 2021   DM type 1.5 managed as type 1 on insulin  Diabetes A1C 6.4% 2022, 9.4% 2021, 6.6% 2021, 6.4% 2020  Again Education, diet, exercise. Risk, complication, treatment. Current med  INsulin 70/30 25u in morning and evening only 15  With her last pregnancy she was on Humalog 15u TID with meal. Will put her back on this. D/c the mix insulin 70/30  Refer to endo for management during pregnancy      HTN:   Metoprolol and hctz 12.5mg  D/c both metoprolol and HCTZ  Start labetalol    Depression, anxiety - stable  Mother had depression. Is seeing Therapist Dr. Eric Parisi. Denies SI or HI. Lexapro 20mg daily  Clonazepam prn last wrote #20 2021, she still have left over. - d/marques  Continue lexapro    GERD: d/c ppi     Smoker: was put on wellbutrin SR, \"doesn't like the way it makes me feel. \"    She have OBGYN   No longer on birth control.    Not sexually active. A comprehensive review of systems was negative except for that written in the HPI. Assessment & Plan:   Diagnoses and all orders for this visit:    1. 7 weeks gestation of pregnancy    2. Pre-existing essential htn comp pregnancy, first trimester  -     labetaloL (NORMODYNE) 100 mg tablet; Take 1 Tablet by mouth two (2) times a day. -     METABOLIC PANEL, COMPREHENSIVE; Future  -     TSH 3RD GENERATION; Future    3. Depression with anxiety  -     escitalopram oxalate (LEXAPRO) 20 mg tablet; Take 1 Tablet by mouth daily.  -     METABOLIC PANEL, COMPREHENSIVE; Future  -     TSH 3RD GENERATION; Future    4. Diabetes 1.5, managed as type 1 ([facility] [address]  -     REFERRAL TO ENDOCRINOLOGY  -     insulin lispro (HUMALOG) 100 unit/mL kwikpen; 15u three times daily with meal, will be taken over by [facility], will be adjust by Endo  -     HEMOGLOBIN A1C WITH EAG; Future  -     METABOLIC PANEL, COMPREHENSIVE; Future  -     TSH 3RD GENERATION; Future  -     CBC W/O DIFF; Future  -     MICROALBUMIN, UR, RAND W/ MICROALB/CREAT RATIO; Future    5. Encounter for long-term (current) use of medications  -     HEMOGLOBIN A1C WITH EAG; Future  -     METABOLIC PANEL, COMPREHENSIVE; Future  -     TSH 3RD GENERATION; Future  -     CBC W/O DIFF; Future  -     MICROALBUMIN, UR, RAND W/ MICROALB/CREAT RATIO; Future      Follow-up and Dispositions    Return in about 4 weeks (around 10/7/2022) for HTN, anxiety, depression. 712  Subjective:   700 Kevin Hutchinson is a 40 y.o. female who was seen for Diabetes and Hypertension (She is pregnant about 4 mos)      Prior to Admission medications    Medication Sig Start Date End Date Taking? Authorizing Provider   labetaloL (NORMODYNE) 100 mg tablet Take 1 Tablet by mouth two (2) times a day. 9/9/22  Yes Sally Wall MD   escitalopram oxalate (LEXAPRO) 20 mg tablet Take 1 Tablet by mouth daily.  9/9/22  Yes Sally Wall MD   insulin lispro (HUMALOG) 100 unit/mL kwikpen 15u three times daily with meal, will be taken over by [facility], will be adjust by Endo 9/9/22  Yes Kwadwo Staley MD   Insulin Needles, Disposable, (ReliOn Pen Needles) 32 gauge x 5/32\" ndle Use twice daily with insulin 9/30/21  Yes Kwadwo Staley MD   EPINEPHrine (EPIPEN) 0.3 mg/0.3 mL injection 0.3 ML BY INTRAMUSCULAR ROUTE ONCE AS NEEDED FOR ALLERGIC RESPONSE FOR UP TO 1 DOSE. 7/21/21  Yes Provider, Historical   lancets 33 gauge misc CHECK BG TWICE A DAY 7/16/21  Yes Provider, Historical   diphenhydrAMINE (Benadryl Allergy) 25 mg tablet Take 1 Tablet by mouth every six (6) hours as needed for Itching. 7/20/21  Yes Annie Donald MD   lancets misc Check BG BID 7/16/21  Yes Kwadwo Staley MD   glucose blood VI test strips (ASCENSIA AUTODISC VI, ONE TOUCH ULTRA TEST VI) strip Check BID. Brand whatever insurance pay. 7/16/21  Yes Dmitri Santana MD     Allergies   Allergen Reactions    Chantix [Varenicline] Nausea Only    Lisinopril Cough         Objective:   Vital Signs: (As obtained by patient/caregiver at home)  There were no vitals taken for this visit.      Constitutional: [x] Appears well-developed and well-nourished [x] No apparent distress        Mental status: [x] Alert and awake  [x] Oriented to person/place/time [x] Able to follow commands      Eyes:   EOM    [x]  Normal      Sclera  [x]  Normal              Discharge [x]  None visible       HENT: [x] Normocephalic, atraumatic    [] Mouth/Throat: Mucous membranes are moist    External Ears [x] Normal      Neck: [x] No visualized mass     Pulmonary/Chest: [x] Respiratory effort normal   [x] No visualized signs of difficulty breathing or respiratory distress           Musculoskeletal:   [x] Normal gait with no signs of ataxia         [x] Normal range of motion of neck         Neurological:        [x] No Facial Asymmetry (Cranial nerve 7 motor function) (limited exam due to video visit)          [x] No gaze palsy              Skin:        [x] No significant exanthematous lesions or discoloration noted on facial skin                  Psychiatric:       [x] Normal Affect [] Abnormal -        [x] No Hallucinations      We discussed the expected course, resolution and complications of the diagnosis(es) in detail. Medication risks, benefits, costs, interactions, and alternatives were discussed as indicated. I advised her to contact the office if her condition worsens, changes or fails to improve as anticipated. She expressed understanding with the diagnosis(es) and plan. Pamela Miguel is a 40 y.o. female being evaluated by a video visit encounter for concerns as above. A caregiver was present when appropriate. Due to this being a TeleHealth encounter (During WPJWR-53 public health emergency), evaluation of the following organ systems was limited: Vitals/Constitutional/EENT/Resp/CV/GI//MS/Neuro/Skin/Heme-Lymph-Imm. Pursuant to the emergency declaration under the [facility], [facility] waiver authority and the [employer] Act, this Virtual  Visit was conducted, with patient's (and/or legal guardian's) consent, to reduce the patient's risk of exposure to COVID-19 and provide necessary medical care. Services were provided through a video synchronous discussion virtually to substitute for in-person clinic visit. Patient and provider were located at their individual homes.         Sarah Beth Garcia MD

## 2022-09-09 NOTE — PROGRESS NOTES
Chief Complaint   Patient presents with    Diabetes    Hypertension     She is pregnant about 4 mos       1. Have you been to the ER, urgent care clinic since your last visit? Hospitalized since your last visit? No    2. Have you seen or consulted any other health care providers outside of the [address] [place] [address] since your last visit? Include any pap smears or colon screening.  No

## 2022-09-12 RX ORDER — INSULIN ASPART 100 [IU]/ML
INJECTION, SOLUTION INTRAVENOUS; SUBCUTANEOUS
Qty: 15 ML | Refills: 0 | Status: SHIPPED | OUTPATIENT
Start: 2022-09-12 | End: 2022-09-28 | Stop reason: ALTCHOICE

## 2022-09-26 DIAGNOSIS — O10.011 PRE-EXISTING ESSENTIAL HTN COMP PREGNANCY, FIRST TRIMESTER: ICD-10-CM

## 2022-09-26 RX ORDER — LABETALOL 100 MG/1
100 TABLET, FILM COATED ORAL 2 TIMES DAILY
Qty: 180 TABLET | Refills: 0 | Status: SHIPPED | OUTPATIENT
Start: 2022-09-26 | End: 2022-09-27 | Stop reason: ALTCHOICE

## 2022-09-27 DIAGNOSIS — I10 ESSENTIAL HYPERTENSION: ICD-10-CM

## 2022-09-27 RX ORDER — METOPROLOL SUCCINATE 25 MG/1
25 TABLET, EXTENDED RELEASE ORAL DAILY
Qty: 90 TABLET | Refills: 0 | Status: SHIPPED | OUTPATIENT
Start: 2022-09-27

## 2022-09-27 RX ORDER — HYDROCHLOROTHIAZIDE 12.5 MG/1
12.5 TABLET ORAL DAILY
Qty: 90 TABLET | Refills: 0 | Status: SHIPPED | OUTPATIENT
Start: 2022-09-27

## 2022-09-28 ENCOUNTER — VIRTUAL VISIT (OUTPATIENT)
Dept: FAMILY MEDICINE CLINIC | Age: 37
End: 2022-09-28
Payer: COMMERCIAL

## 2022-09-28 DIAGNOSIS — E13.9 DIABETES 1.5, MANAGED AS TYPE 1 (HCC): ICD-10-CM

## 2022-09-28 DIAGNOSIS — Z79.899 ENCOUNTER FOR LONG-TERM (CURRENT) USE OF MEDICATIONS: Primary | ICD-10-CM

## 2022-09-28 DIAGNOSIS — E11.65 TYPE 2 DIABETES MELLITUS WITH HYPERGLYCEMIA, WITHOUT LONG-TERM CURRENT USE OF INSULIN (HCC): ICD-10-CM

## 2022-09-28 DIAGNOSIS — I10 ESSENTIAL HYPERTENSION: ICD-10-CM

## 2022-09-28 PROCEDURE — 99213 OFFICE O/P EST LOW 20 MIN: CPT | Performed by: FAMILY MEDICINE

## 2022-09-28 PROCEDURE — 3044F HG A1C LEVEL LT 7.0%: CPT | Performed by: FAMILY MEDICINE

## 2022-09-28 RX ORDER — LANCETS
EACH MISCELLANEOUS
Qty: 50 EACH | Refills: 5 | Status: SHIPPED | OUTPATIENT
Start: 2022-09-28

## 2022-09-28 RX ORDER — INSULIN ASPART 100 [IU]/ML
INJECTION, SUSPENSION SUBCUTANEOUS
Qty: 15 ML | Refills: 1 | Status: SHIPPED | OUTPATIENT
Start: 2022-09-28

## 2022-09-28 NOTE — PROGRESS NOTES
Michael Fagan is a 40 y.o. female  Chief Complaint   Patient presents with    Follow-up     Medication adjustment B/S 119 8 am fasting. Use [phone] for appointment. 1. Have you been to the ER,  no urgent care clinic since no your last visit? Hospitalized since your last visit? no    2. Have you seen or consulted any other health care providers outside of the [address] [place] [address] since your last visit? yes Include any pap smears or colon screening.  no

## 2022-09-28 NOTE — PROGRESS NOTES
Consent: Norma Terry, who was seen by synchronous (real-time) audio-video technology, and/or her healthcare decision maker, is aware that this patient-initiated, Telehealth encounter on 2022 is a billable service, with coverage as determined by her insurance carrier. She is aware that she may receive a bill and has provided verbal consent to proceed: Yes. Norma Terry is a 40 y.o. female    Refuses covid vaccine  Refuses other vaccines    LMP:     Since our last visit she had a miscarriage      has a past medical history of Asthma, Depression with anxiety (2019), Diabetes 1.5, managed as type 1 ([facility] (2022), Essential hypertension (2019), Gastroesophageal reflux disease without esophagitis (3/10/2017), History of seasonal allergies, IBS (irritable bowel syndrome), Non-compliance (2020), [facility] Sciatica, right side (3/10/2017), and Smoker (2016). Labs review w pt    Diabetes since 2021   DM type 1.5 managed as type 1 on insulin  Diabetes A1C 6.4% 2022, 9.4% 2021, 6.6% 2021, 6.4% 2020  Again Education, diet, exercise. Risk, complication, treatment. D/c novolog,   Go back to   INsulin 70/30 25u in morning and evening only 15      HTN:   Go back to Metoprolol and hctz 12.5mg  D/c labetalol    Depression, anxiety - stable  Mother had depression. Is seeing Therapist Dr. Giorgio Tena. Denies SI or HI. Lexapro 20mg daily  Clonazepam prn last wrote #20 2021, she still have left over. - d/marques  Continue lexapro    GERD: d/c ppi     Smoker: was put on wellbutrin SR, \"doesn't like the way it makes me feel. \"    She have OBGYN   No longer on birth control. Not sexually active. A comprehensive review of systems was negative except for that written in the HPI. Assessment & Plan:   Diagnoses and all orders for this visit:    1. Encounter for long-term (current) use of medications  -     LIPID PANEL; Future    2. Diabetes 1.5, managed as type 1 (HCC)  -     insulin aspart protamine/insulin aspart (NovoLOG Mix 70-30FlexPen U-100) 100 unit/mL (70-30) inpn; INJECT 28 UNITS SUBCUTANOUSLY W BREAKFAST AND 15 UNITS WITH DINNER plus 15 units with each meal  -     LIPID PANEL; Future    3. Essential hypertension    4. Type 2 diabetes mellitus with hyperglycemia, without long-term current use of insulin (HCC)  -     lancets misc; Check BG BID        Follow-up and Dispositions    Return in about 5 weeks (around 11/2/2022) for DM2, HTN, labs, meds. 712  Subjective:   Lyric Hutchinson is a 40 y.o. female who was seen for Follow-up (Medication adjustment B/S 119 8 am fasting. Use [phone] for appointment.)      Prior to Admission medications    Medication Sig Start Date End Date Taking? Authorizing Provider   insulin aspart protamine/insulin aspart (NovoLOG Mix 70-30FlexPen U-100) 100 unit/mL (70-30) inpn INJECT 28 UNITS SUBCUTANOUSLY W BREAKFAST AND 15 UNITS WITH DINNER plus 15 units with each meal 9/28/22  Yes Shannen Staley MD   lancets misc Check BG BID 9/28/22  Yes Shannen Staley MD   hydroCHLOROthiazide (HYDRODIURIL) 12.5 mg tablet Take 1 Tablet by mouth daily. 9/27/22  Yes Hardeep Martin MD   metoprolol succinate (TOPROL-XL) 25 mg XL tablet Take 1 Tablet by mouth daily. 9/27/22  Yes Hardeep Martin MD   escitalopram oxalate (LEXAPRO) 20 mg tablet Take 1 Tablet by mouth daily. 9/9/22  Yes Hardeep Martin MD   Insulin Needles, Disposable, (ReliOn Pen Needles) 32 gauge x 5/32\" ndle Use twice daily with insulin 9/30/21  Yes Shannen Staley MD   EPINEPHrine (EPIPEN) 0.3 mg/0.3 mL injection 0.3 ML BY INTRAMUSCULAR ROUTE ONCE AS NEEDED FOR ALLERGIC RESPONSE FOR UP TO 1 DOSE. 7/21/21  Yes Provider, Historical   lancets 33 gauge misc CHECK BG TWICE A DAY 7/16/21  Yes Provider, Historical   glucose blood VI test strips (ASCENSIA AUTODISC VI, ONE TOUCH ULTRA TEST VI) strip Check BID. Brand whatever insurance pay.  7/16/21  Yes Nisha Concepcion MD     Allergies   Allergen Reactions    Chantix [Varenicline] Nausea Only    Lisinopril Cough         Objective:   Vital Signs: (As obtained by patient/caregiver at home)  Visit Vitals  LMP 05/06/2022        Constitutional: [x] Appears well-developed and well-nourished [x] No apparent distress        Mental status: [x] Alert and awake  [x] Oriented to person/place/time [x] Able to follow commands      Eyes:   EOM    [x]  Normal      Sclera  [x]  Normal              Discharge [x]  None visible       HENT: [x] Normocephalic, atraumatic    [] Mouth/Throat: Mucous membranes are moist    External Ears [x] Normal      Neck: [x] No visualized mass     Pulmonary/Chest: [x] Respiratory effort normal   [x] No visualized signs of difficulty breathing or respiratory distress           Musculoskeletal:   [x] Normal gait with no signs of ataxia         [x] Normal range of motion of neck         Neurological:        [x] No Facial Asymmetry (Cranial nerve 7 motor function) (limited exam due to video visit)          [x] No gaze palsy              Skin:        [x] No significant exanthematous lesions or discoloration noted on facial skin                  Psychiatric:       [x] Normal Affect [] Abnormal -        [x] No Hallucinations      We discussed the expected course, resolution and complications of the diagnosis(es) in detail. Medication risks, benefits, costs, interactions, and alternatives were discussed as indicated. I advised her to contact the office if her condition worsens, changes or fails to improve as anticipated. She expressed understanding with the diagnosis(es) and plan. Constance Esparza is a 40 y.o. female being evaluated by a video visit encounter for concerns as above. A caregiver was present when appropriate.  Due to this being a TeleHealth encounter (During [facility] public health emergency), evaluation of the following organ systems was limited: Vitals/Constitutional/EENT/Resp/CV/GI//MS/Neuro/Skin/Heme-Lymph-Imm. Pursuant to the emergency declaration under the [address] [place] [address], [facility] waiver authority and the [employer] Act, this Virtual  Visit was conducted, with patient's (and/or legal guardian's) consent, to reduce the patient's risk of exposure to COVID-19 and provide necessary medical care. Services were provided through a video synchronous discussion virtually to substitute for in-person clinic visit. Patient and provider were located at their individual homes.         Bibi Hayes MD

## 2022-11-11 ENCOUNTER — TELEPHONE (OUTPATIENT)
Dept: FAMILY MEDICINE CLINIC | Age: 37
End: 2022-11-11

## 2022-11-11 NOTE — TELEPHONE ENCOUNTER
----- Message from Berta Elliott sent at 11/11/2022  8:25 AM EST -----  Subject: Message to Provider    QUESTIONS  Information for Provider? patient wants to know which lab [facility] she needs   to go to for labs. Please a  ---------------------------------------------------------------------------  --------------  Michael Mathur INFO  [phone]; OK to leave message on voicemail  ---------------------------------------------------------------------------  --------------  SCRIPT ANSWERS  Relationship to Patient?  Self

## 2022-11-17 LAB
ALBUMIN SERPL-MCNC: 4.6 G/DL (ref 3.8–4.8)
ALBUMIN/CREAT UR: 4 MG/G CREAT (ref 0–29)
ALBUMIN/GLOB SERPL: 1.6 {RATIO} (ref 1.2–2.2)
ALP SERPL-CCNC: 89 IU/L (ref 44–121)
ALT SERPL-CCNC: 23 IU/L (ref 0–32)
AST SERPL-CCNC: 17 IU/L (ref 0–40)
BILIRUB SERPL-MCNC: 0.3 MG/DL (ref 0–1.2)
BUN SERPL-MCNC: 12 MG/DL (ref 6–20)
BUN/CREAT SERPL: 14 (ref 9–23)
CALCIUM SERPL-MCNC: 9.7 MG/DL (ref 8.7–10.2)
CHLORIDE SERPL-SCNC: 103 MMOL/L (ref 96–106)
CHOLEST SERPL-MCNC: 194 MG/DL (ref 100–199)
CO2 SERPL-SCNC: 22 MMOL/L (ref 20–29)
CREAT SERPL-MCNC: 0.84 MG/DL (ref 0.57–1)
CREAT UR-MCNC: 244.5 MG/DL
EGFR: 92 ML/MIN/1.73
ERYTHROCYTE [DISTWIDTH] IN BLOOD BY AUTOMATED COUNT: 13.1 % (ref 11.7–15.4)
EST. AVERAGE GLUCOSE BLD GHB EST-MCNC: 134 MG/DL
GLOBULIN SER CALC-MCNC: 2.9 G/DL (ref 1.5–4.5)
GLUCOSE SERPL-MCNC: 76 MG/DL (ref 70–99)
HBA1C MFR BLD: 6.3 % (ref 4.8–5.6)
HCT VFR BLD AUTO: 39.9 % (ref 34–46.6)
HDLC SERPL-MCNC: 57 MG/DL
HGB BLD-MCNC: 13.6 G/DL (ref 11.1–15.9)
LDLC SERPL CALC-MCNC: 112 MG/DL (ref 0–99)
MCH RBC QN AUTO: 30 PG (ref 26.6–33)
MCHC RBC AUTO-ENTMCNC: 34.1 G/DL (ref 31.5–35.7)
MCV RBC AUTO: 88 FL (ref 79–97)
MICROALBUMIN UR-MCNC: 8.6 UG/ML
PLATELET # BLD AUTO: 288 X10E3/UL (ref 150–450)
POTASSIUM SERPL-SCNC: 4 MMOL/L (ref 3.5–5.2)
PROT SERPL-MCNC: 7.5 G/DL (ref 6–8.5)
RBC # BLD AUTO: 4.53 X10E6/UL (ref 3.77–5.28)
SODIUM SERPL-SCNC: 140 MMOL/L (ref 134–144)
TRIGL SERPL-MCNC: 145 MG/DL (ref 0–149)
TSH SERPL DL<=0.005 MIU/L-ACNC: 1.27 UIU/ML (ref 0.45–4.5)
VLDLC SERPL CALC-MCNC: 25 MG/DL (ref 5–40)
WBC # BLD AUTO: 7.8 X10E3/UL (ref 3.4–10.8)

## 2022-11-18 ENCOUNTER — VIRTUAL VISIT (OUTPATIENT)
Dept: FAMILY MEDICINE CLINIC | Age: 37
End: 2022-11-18
Payer: COMMERCIAL

## 2022-11-18 DIAGNOSIS — F41.8 DEPRESSION WITH ANXIETY: ICD-10-CM

## 2022-11-18 DIAGNOSIS — E13.9 DIABETES 1.5, MANAGED AS TYPE 1 (HCC): ICD-10-CM

## 2022-11-18 DIAGNOSIS — I10 ESSENTIAL HYPERTENSION: ICD-10-CM

## 2022-11-18 PROCEDURE — 99213 OFFICE O/P EST LOW 20 MIN: CPT | Performed by: FAMILY MEDICINE

## 2022-11-18 PROCEDURE — 3044F HG A1C LEVEL LT 7.0%: CPT | Performed by: FAMILY MEDICINE

## 2022-11-18 RX ORDER — HYDROCHLOROTHIAZIDE 12.5 MG/1
12.5 TABLET ORAL DAILY
Qty: 90 TABLET | Refills: 1 | Status: SHIPPED | OUTPATIENT
Start: 2022-11-18

## 2022-11-18 RX ORDER — INSULIN ASPART 100 [IU]/ML
INJECTION, SUSPENSION SUBCUTANEOUS
Qty: 15 ML | Refills: 1 | Status: SHIPPED | OUTPATIENT
Start: 2022-11-18 | End: 2022-11-21 | Stop reason: SDUPTHER

## 2022-11-18 RX ORDER — ESCITALOPRAM OXALATE 20 MG/1
20 TABLET ORAL DAILY
Qty: 90 TABLET | Refills: 1 | Status: SHIPPED | OUTPATIENT
Start: 2022-11-18

## 2022-11-18 RX ORDER — METOPROLOL SUCCINATE 25 MG/1
25 TABLET, EXTENDED RELEASE ORAL DAILY
Qty: 90 TABLET | Refills: 1 | Status: SHIPPED | OUTPATIENT
Start: 2022-11-18

## 2022-11-18 NOTE — PROGRESS NOTES
Chief Complaint   Patient presents with    Diabetes    Hypertension     Check meds & labs    1. Have you been to the ER, urgent care clinic since your last visit? Hospitalized since your last visit? No    2. Have you seen or consulted any other health care providers outside of the [address] [place] [address] since your last visit? Include any pap smears or colon screening.  No

## 2022-11-18 NOTE — PROGRESS NOTES
Consent: Mckenzie Ray, who was seen by synchronous (real-time) audio-video technology, and/or her healthcare decision maker, is aware that this patient-initiated, Telehealth encounter on 2022 is a billable service, with coverage as determined by her insurance carrier. She is aware that she may receive a bill and has provided verbal consent to proceed: Yes. Mckenzie Ray is a 40 y.o. female    Refuses covid vaccine  Refuses other vaccines    LMP: 10/26/2022    Since our last visit she had a miscarriage      has a past medical history of Asthma, Depression with anxiety (2019), Diabetes 1.5, managed as type 1 ([facility] [address] (2022), Essential hypertension (2019), Gastroesophageal reflux disease without esophagitis (3/10/2017), History of seasonal allergies, IBS (irritable bowel syndrome), Non-compliance (2020), [facility] Sciatica, right side (3/10/2017), and Smoker (2016). Labs review w pt    Diabetes since 2021   DM type 1.5 managed as type 1 on insulin  Diabetes A1C 6.3% 2022, 6.4% 2022, 9.4% 2021, 6.6% 2021, 6.4% 2020  Again Education, diet, exercise. Risk, complication, treatment. INsulin 70/30 25u in morning and evening only 15  Ave bg <120. Denies hypoglycemia. HTN:   Go back to Metoprolol and hctz 12.5mg    Depression, anxiety - stable  Mother had depression. Is seeing Therapist Dr. Louetta Phoenix. Denies SI or HI. Lexapro 20mg daily  Clonazepam prn last wrote #20 2021, she still have left over. - d/marques  Continue lexapro    GERD: d/c ppi     Smoker: was put on wellbutrin SR, \"doesn't like the way it makes me feel. \"    She have OBGYN   No longer on birth control. Not sexually active. A comprehensive review of systems was negative except for that written in the HPI. Assessment & Plan:   Diagnoses and all orders for this visit:    1.  Diabetes 1.5, managed as type 1 (HCC)  -     insulin aspart protamine/insulin aspart (NovoLOG Mix 70-30FlexPen U-100) 100 unit/mL (70-30) inpn; INJECT 28 UNITS SUBCUTANOUSLY W BREAKFAST AND 15 UNITS WITH DINNER plus 15 units with each meal    2. Essential hypertension  -     hydroCHLOROthiazide (HYDRODIURIL) 12.5 mg tablet; Take 1 Tablet by mouth daily. -     metoprolol succinate (TOPROL-XL) 25 mg XL tablet; Take 1 Tablet by mouth daily. 3. Depression with anxiety  -     escitalopram oxalate (LEXAPRO) 20 mg tablet; Take 1 Tablet by mouth daily. Follow-up and Dispositions    Return in about 4 months (around 3/18/2023) for DM2, HTN, depression. 712  Subjective:   700 Kevin Hutchinson is a 40 y.o. female who was seen for Diabetes and Hypertension      Prior to Admission medications    Medication Sig Start Date End Date Taking? Authorizing Provider   insulin aspart protamine/insulin aspart (NovoLOG Mix 70-30FlexPen U-100) 100 unit/mL (70-30) inpn INJECT 28 UNITS SUBCUTANOUSLY W BREAKFAST AND 15 UNITS WITH DINNER plus 15 units with each meal 11/18/22  Yes Concha Staley MD   hydroCHLOROthiazide (HYDRODIURIL) 12.5 mg tablet Take 1 Tablet by mouth daily. 11/18/22  Yes Abelardo Mejia MD   metoprolol succinate (TOPROL-XL) 25 mg XL tablet Take 1 Tablet by mouth daily. 11/18/22  Yes Abelardo Mejia MD   escitalopram oxalate (LEXAPRO) 20 mg tablet Take 1 Tablet by mouth daily. 11/18/22  Yes Abelardo Mejia MD   lancets misc Check BG BID 9/28/22  Yes Abelardo Mejia MD   Insulin Needles, Disposable, (ReliOn Pen Needles) 32 gauge x 5/32\" ndle Use twice daily with insulin 9/30/21  Yes Concha Staley MD   lancets 33 gauge misc CHECK BG TWICE A DAY 7/16/21  Yes Provider, Historical   glucose blood VI test strips (ASCENSIA AUTODISC VI, ONE TOUCH ULTRA TEST VI) strip Check BID. Brand whatever insurance pay. 7/16/21  Yes Concha Staley MD   EPINEPHrine (EPIPEN) 0.3 mg/0.3 mL injection 0.3 ML BY INTRAMUSCULAR ROUTE ONCE AS NEEDED FOR ALLERGIC RESPONSE FOR UP TO 1 DOSE.   Patient not taking: Reported on 11/18/2022 7/21/21   Provider, Historical     Allergies   Allergen Reactions    Chantix [Varenicline] Nausea Only    Lisinopril Cough         Objective:   Vital Signs: (As obtained by patient/caregiver at home)  Visit Vitals  LMP 05/06/2022        Constitutional: [x] Appears well-developed and well-nourished [x] No apparent distress        Mental status: [x] Alert and awake  [x] Oriented to person/place/time [x] Able to follow commands      Eyes:   EOM    [x]  Normal      Sclera  [x]  Normal              Discharge [x]  None visible       HENT: [x] Normocephalic, atraumatic    [] Mouth/Throat: Mucous membranes are moist    External Ears [x] Normal      Neck: [x] No visualized mass     Pulmonary/Chest: [x] Respiratory effort normal   [x] No visualized signs of difficulty breathing or respiratory distress           Musculoskeletal:   [x] Normal gait with no signs of ataxia         [x] Normal range of motion of neck         Neurological:        [x] No Facial Asymmetry (Cranial nerve 7 motor function) (limited exam due to video visit)          [x] No gaze palsy              Skin:        [x] No significant exanthematous lesions or discoloration noted on facial skin                  Psychiatric:       [x] Normal Affect [] Abnormal -        [x] No Hallucinations      We discussed the expected course, resolution and complications of the diagnosis(es) in detail. Medication risks, benefits, costs, interactions, and alternatives were discussed as indicated. I advised her to contact the office if her condition worsens, changes or fails to improve as anticipated. She expressed understanding with the diagnosis(es) and plan. Isaias Tabares is a 40 y.o. female being evaluated by a video visit encounter for concerns as above. A caregiver was present when appropriate.  Due to this being a TeleHealth encounter (During [facility]-80 public health emergency), evaluation of the following organ systems was limited: Vitals/Constitutional/EENT/Resp/CV/GI//MS/Neuro/Skin/Heme-Lymph-Imm. Pursuant to the emergency declaration under the [address] [place] [address], [facility] waiver authority and the [employer] Act, this Virtual  Visit was conducted, with patient's (and/or legal guardian's) consent, to reduce the patient's risk of exposure to COVID-19 and provide necessary medical care. Services were provided through a video synchronous discussion virtually to substitute for in-person clinic visit. Patient and provider were located at their individual homes.         Twan Vergara MD

## 2022-11-21 DIAGNOSIS — E13.9 DIABETES 1.5, MANAGED AS TYPE 1 (HCC): ICD-10-CM

## 2022-11-21 RX ORDER — INSULIN ASPART 100 [IU]/ML
INJECTION, SUSPENSION SUBCUTANEOUS
Qty: 15 ML | Refills: 1 | Status: SHIPPED | OUTPATIENT
Start: 2022-11-21

## 2023-02-17 ENCOUNTER — DOCUMENTATION ONLY (OUTPATIENT)
Dept: FAMILY MEDICINE CLINIC | Age: 38
End: 2023-02-17

## 2023-02-17 ENCOUNTER — VIRTUAL VISIT (OUTPATIENT)
Dept: FAMILY MEDICINE CLINIC | Age: 38
End: 2023-02-17
Payer: COMMERCIAL

## 2023-02-17 ENCOUNTER — TELEPHONE (OUTPATIENT)
Dept: FAMILY MEDICINE CLINIC | Age: 38
End: 2023-02-17

## 2023-02-17 DIAGNOSIS — E13.9 DIABETES 1.5, MANAGED AS TYPE 1 (HCC): Primary | ICD-10-CM

## 2023-02-17 DIAGNOSIS — Z79.899 ENCOUNTER FOR LONG-TERM (CURRENT) USE OF MEDICATIONS: ICD-10-CM

## 2023-02-17 DIAGNOSIS — Z71.3 WEIGHT LOSS COUNSELING, ENCOUNTER FOR: ICD-10-CM

## 2023-02-17 RX ORDER — TIRZEPATIDE 7.5 MG/.5ML
0.5 INJECTION, SOLUTION SUBCUTANEOUS
Qty: 4 ADJUSTABLE DOSE PRE-FILLED PEN SYRINGE | Refills: 2 | Status: SHIPPED | OUTPATIENT
Start: 2023-02-17

## 2023-02-17 NOTE — PROGRESS NOTES
Chief Complaint   Patient presents with    Weight Loss     Wants to discuss medication       1. Have you been to the ER, urgent care clinic since your last visit? Hospitalized since your last visit? No    2. Have you seen or consulted any other health care providers outside of the [address] [place] [address] since your last visit? Include any pap smears or colon screening.  No

## 2023-02-17 NOTE — PROGRESS NOTES
Consent: Lola Fuentes, who was seen by synchronous (real-time) audio-video technology, and/or her healthcare decision maker, is aware that this patient-initiated, Telehealth encounter on 2022 is a billable service, with coverage as determined by her insurance carrier. She is aware that she may receive a bill and has provided verbal consent to proceed: Yes. Lola Fuentes is a 40 y.o. female    Refuses covid vaccine  Refuses other vaccines    LMP: 2023      Not in a relationship currently and not sexually active. has a past medical history of Asthma, Depression with anxiety (2019), Diabetes 1.5, managed as type 1 ([facility] [address] (2022), Essential hypertension (2019), Gastroesophageal reflux disease without esophagitis (3/10/2017), History of seasonal allergies, IBS (irritable bowel syndrome), Non-compliance (2020), Psychotic disorder ([facility] Sciatica, right side (3/10/2017), and Smoker (2016). Weight loss discussion  233lb  BMI 41  We discussed alternative methods. She specifically asked about Mounjaron    She have DM 1.5 treated as type 1 with insulin. 25u am and 15u pm.   A1C at goal.     We'll add mounjaron to her regiment. Discussed I can try alternatives but no Prior Auth will be done b/c it's indicated for Type 2 diabetes. We can f/up and try alternative meds for weight loss    We'll get labs for her chronic medical conditions      A comprehensive review of systems was negative except for that written in the HPI. Assessment & Plan:   Diagnoses and all orders for this visit:    1. Diabetes 1.5, managed as type 1 (HCC)  -     insulin aspart protamine/insulin aspart (NovoLOG Mix 70-30FlexPen U-100) 100 unit/mL (70-30) inpn; INJECT 28 UNITS SUBCUTANOUSLY W BREAKFAST AND 15 UNITS WITH DINNER plus 15 units with each meal    2. Essential hypertension  -     hydroCHLOROthiazide (HYDRODIURIL) 12.5 mg tablet; Take 1 Tablet by mouth daily.   -     metoprolol succinate (TOPROL-XL) 25 mg XL tablet; Take 1 Tablet by mouth daily. 3. Depression with anxiety  -     escitalopram oxalate (LEXAPRO) 20 mg tablet; Take 1 Tablet by mouth daily. Follow-up and Dispositions    Return in about 4 weeks (around 3/17/2023) for DM2, HTN, meds, weight loss. 712  Subjective:   Lyric Hutchinson is a 40 y.o. female who was seen for Diabetes and Hypertension      Prior to Admission medications    Medication Sig Start Date End Date Taking? Authorizing Provider   insulin aspart protamine/insulin aspart (NovoLOG Mix 70-30FlexPen U-100) 100 unit/mL (70-30) inpn INJECT 28 UNITS SUBCUTANOUSLY W BREAKFAST AND 15 UNITS WITH DINNER plus 15 units with each meal 11/18/22  Yes Cleveland Staley MD   hydroCHLOROthiazide (HYDRODIURIL) 12.5 mg tablet Take 1 Tablet by mouth daily. 11/18/22  Yes Андрей Collins MD   metoprolol succinate (TOPROL-XL) 25 mg XL tablet Take 1 Tablet by mouth daily. 11/18/22  Yes Андрей Collins MD   escitalopram oxalate (LEXAPRO) 20 mg tablet Take 1 Tablet by mouth daily. 11/18/22  Yes Андрей Collins MD   lancets misc Check BG BID 9/28/22  Yes Андрей Collins MD   Insulin Needles, Disposable, (ReliOn Pen Needles) 32 gauge x 5/32\" ndle Use twice daily with insulin 9/30/21  Yes Cleveland Staley MD   lancets 33 gauge misc CHECK BG TWICE A DAY 7/16/21  Yes Provider, Historical   glucose blood VI test strips (ASCENSIA AUTODISC VI, ONE TOUCH ULTRA TEST VI) strip Check BID. Brand whatever insurance pay. 7/16/21  Yes Cleveland Staley MD   EPINEPHrine (EPIPEN) 0.3 mg/0.3 mL injection 0.3 ML BY INTRAMUSCULAR ROUTE ONCE AS NEEDED FOR ALLERGIC RESPONSE FOR UP TO 1 DOSE.   Patient not taking: Reported on 11/18/2022 7/21/21   Provider, Historical     Allergies   Allergen Reactions    Chantix [Varenicline] Nausea Only    Lisinopril Cough         Objective:   Vital Signs: (As obtained by patient/caregiver at home)  Visit Vitals  LMP 05/06/2022        Constitutional: [x] Appears well-developed and well-nourished [x] No apparent distress        Mental status: [x] Alert and awake  [x] Oriented to person/place/time [x] Able to follow commands      Eyes:   EOM    [x]  Normal      Sclera  [x]  Normal              Discharge [x]  None visible       HENT: [x] Normocephalic, atraumatic    [] Mouth/Throat: Mucous membranes are moist    External Ears [x] Normal      Neck: [x] No visualized mass     Pulmonary/Chest: [x] Respiratory effort normal   [x] No visualized signs of difficulty breathing or respiratory distress           Musculoskeletal:   [x] Normal gait with no signs of ataxia         [x] Normal range of motion of neck         Neurological:        [x] No Facial Asymmetry (Cranial nerve 7 motor function) (limited exam due to video visit)          [x] No gaze palsy              Skin:        [x] No significant exanthematous lesions or discoloration noted on facial skin                  Psychiatric:       [x] Normal Affect [] Abnormal -        [x] No Hallucinations      We discussed the expected course, resolution and complications of the diagnosis(es) in detail. Medication risks, benefits, costs, interactions, and alternatives were discussed as indicated. I advised her to contact the office if her condition worsens, changes or fails to improve as anticipated. She expressed understanding with the diagnosis(es) and plan. Pattie Coyle is a 40 y.o. female being evaluated by a video visit encounter for concerns as above. A caregiver was present when appropriate. Due to this being a TeleHealth encounter (During [facility]- public [facility] emergency), evaluation of the following organ systems was limited: Vitals/Constitutional/EENT/Resp/CV/GI//MS/Neuro/Skin/Heme-Lymph-Imm.   Pursuant to the emergency declaration under the [facility] waiver authority and the [employer] Act, this Virtual  Visit was conducted, with patient's (and/or legal guardian's) consent, to reduce the patient's risk of exposure to COVID-19 and provide necessary medical care. Services were provided through a video synchronous discussion virtually to substitute for in-person clinic visit. Patient and provider were located at their individual homes.         Lilly Gagnon MD

## 2023-02-20 DIAGNOSIS — E13.9 DIABETES 1.5, MANAGED AS TYPE 1 (HCC): Primary | ICD-10-CM

## 2023-02-20 NOTE — TELEPHONE ENCOUNTER
Called, spoke to patient. Two patient identifiers confirmed. Informed patient that Dr. Madeleine Licona sent in ozempic as an alternative. Patient verbalized understanding of information discussed w/ no further questions at this time.

## 2023-06-09 ENCOUNTER — TELEMEDICINE (OUTPATIENT)
Age: 38
End: 2023-06-09
Payer: COMMERCIAL

## 2023-06-09 ENCOUNTER — TELEPHONE (OUTPATIENT)
Age: 38
End: 2023-06-09

## 2023-06-09 DIAGNOSIS — I10 PRIMARY HYPERTENSION: ICD-10-CM

## 2023-06-09 DIAGNOSIS — Z79.899 ENCOUNTER FOR LONG-TERM (CURRENT) USE OF MEDICATIONS: ICD-10-CM

## 2023-06-09 DIAGNOSIS — Z91.199 NON-COMPLIANCE: ICD-10-CM

## 2023-06-09 DIAGNOSIS — E13.9 DIABETES 1.5, MANAGED AS TYPE 1 (HCC): Primary | ICD-10-CM

## 2023-06-09 DIAGNOSIS — F41.8 DEPRESSION WITH ANXIETY: ICD-10-CM

## 2023-06-09 DIAGNOSIS — E66.01 SEVERE OBESITY (HCC): ICD-10-CM

## 2023-06-09 PROCEDURE — 99214 OFFICE O/P EST MOD 30 MIN: CPT | Performed by: FAMILY MEDICINE

## 2023-06-09 RX ORDER — HYDROCHLOROTHIAZIDE 12.5 MG/1
12.5 TABLET ORAL DAILY
Qty: 90 TABLET | Refills: 0 | Status: SHIPPED | OUTPATIENT
Start: 2023-06-09

## 2023-06-09 RX ORDER — METOPROLOL SUCCINATE 25 MG/1
25 TABLET, EXTENDED RELEASE ORAL DAILY
Qty: 90 TABLET | Refills: 0 | Status: SHIPPED | OUTPATIENT
Start: 2023-06-09

## 2023-06-09 RX ORDER — ESCITALOPRAM OXALATE 20 MG/1
20 TABLET ORAL DAILY
Qty: 90 TABLET | Refills: 0 | Status: SHIPPED | OUTPATIENT
Start: 2023-06-09

## 2023-06-09 SDOH — ECONOMIC STABILITY: FOOD INSECURITY: WITHIN THE PAST 12 MONTHS, THE FOOD YOU BOUGHT JUST DIDN'T LAST AND YOU DIDN'T HAVE MONEY TO GET MORE.: NEVER TRUE

## 2023-06-09 SDOH — ECONOMIC STABILITY: FOOD INSECURITY: WITHIN THE PAST 12 MONTHS, YOU WORRIED THAT YOUR FOOD WOULD RUN OUT BEFORE YOU GOT MONEY TO BUY MORE.: NEVER TRUE

## 2023-06-09 SDOH — ECONOMIC STABILITY: INCOME INSECURITY: HOW HARD IS IT FOR YOU TO PAY FOR THE VERY BASICS LIKE FOOD, HOUSING, MEDICAL CARE, AND HEATING?: NOT HARD AT ALL

## 2023-06-09 SDOH — ECONOMIC STABILITY: HOUSING INSECURITY
IN THE LAST 12 MONTHS, WAS THERE A TIME WHEN YOU DID NOT HAVE A STEADY PLACE TO SLEEP OR SLEPT IN A SHELTER (INCLUDING NOW)?: NO

## 2023-06-09 ASSESSMENT — PATIENT HEALTH QUESTIONNAIRE - PHQ9
1. LITTLE INTEREST OR PLEASURE IN DOING THINGS: 0
SUM OF ALL RESPONSES TO PHQ QUESTIONS 1-9: 0
SUM OF ALL RESPONSES TO PHQ9 QUESTIONS 1 & 2: 0
4. FEELING TIRED OR HAVING LITTLE ENERGY: 0
6. FEELING BAD ABOUT YOURSELF - OR THAT YOU ARE A FAILURE OR HAVE LET YOURSELF OR YOUR FAMILY DOWN: 0
5. POOR APPETITE OR OVEREATING: 0
10. IF YOU CHECKED OFF ANY PROBLEMS, HOW DIFFICULT HAVE THESE PROBLEMS MADE IT FOR YOU TO DO YOUR WORK, TAKE CARE OF THINGS AT HOME, OR GET ALONG WITH OTHER PEOPLE: 0
SUM OF ALL RESPONSES TO PHQ QUESTIONS 1-9: 0
9. THOUGHTS THAT YOU WOULD BE BETTER OFF DEAD, OR OF HURTING YOURSELF: 0
3. TROUBLE FALLING OR STAYING ASLEEP: 0
8. MOVING OR SPEAKING SO SLOWLY THAT OTHER PEOPLE COULD HAVE NOTICED. OR THE OPPOSITE, BEING SO FIGETY OR RESTLESS THAT YOU HAVE BEEN MOVING AROUND A LOT MORE THAN USUAL: 0
SUM OF ALL RESPONSES TO PHQ QUESTIONS 1-9: 0
SUM OF ALL RESPONSES TO PHQ QUESTIONS 1-9: 0
2. FEELING DOWN, DEPRESSED OR HOPELESS: 0
7. TROUBLE CONCENTRATING ON THINGS, SUCH AS READING THE NEWSPAPER OR WATCHING TELEVISION: 0

## 2023-06-09 ASSESSMENT — ANXIETY QUESTIONNAIRES
1. FEELING NERVOUS, ANXIOUS, OR ON EDGE: 0
7. FEELING AFRAID AS IF SOMETHING AWFUL MIGHT HAPPEN: 0
GAD7 TOTAL SCORE: 0
4. TROUBLE RELAXING: 0
5. BEING SO RESTLESS THAT IT IS HARD TO SIT STILL: 0
2. NOT BEING ABLE TO STOP OR CONTROL WORRYING: 0
3. WORRYING TOO MUCH ABOUT DIFFERENT THINGS: 0
6. BECOMING EASILY ANNOYED OR IRRITABLE: 0
IF YOU CHECKED OFF ANY PROBLEMS ON THIS QUESTIONNAIRE, HOW DIFFICULT HAVE THESE PROBLEMS MADE IT FOR YOU TO DO YOUR WORK, TAKE CARE OF THINGS AT HOME, OR GET ALONG WITH OTHER PEOPLE: NOT DIFFICULT AT ALL

## 2023-06-09 NOTE — TELEPHONE ENCOUNTER
Spoke with patient. 4 week follow up appt scheduled. Per her request referral and lab information mailed to her.

## 2023-06-09 NOTE — PROGRESS NOTES
Consent: Nav Hinkle, who was seen by synchronous (real-time) audio-video technology, and/or her healthcare decision maker, is aware that this patient-initiated, Telehealth encounter on 6/9/2023 is a billable service, with coverage as determined by her insurance carrier. She is aware that she may receive a bill and has provided verbal consent to proceed: Yes. Nav Hinkle is a 40 y.o. female    She didn't do our last lab order. Refuses covid vaccine  Refuses other vaccines     LMP:      has a past medical history of Asthma, Depression with anxiety, Diabetes 1.5, managed as type 1 ([facility] Essential hypertension, Gastroesophageal reflux disease without esophagitis, History of seasonal allergies, IBS (irritable bowel syndrome), Non-compliance, Psychotic disorder ([facility] Sciatica, right side, and Smoker. Weight loss discussion  233lb  BMI 41  We discussed alternative methods. She specifically asked about Mounjaron     Last visit we tried mounjaron it dropped her BG and she's no longer taking. She previously did a weight loss surgery program, but she missed the last step. That was approx 2 years ago. She wants to restart again. Will put in referral and we discussed follow insurance instruction for requirement. Check if need to see me 3x and or at least once after starting program for letter of support. We need to f/up w her for these conditions below in 3wks, they haven't been address for 7 months. Diabetes since 04/2021   DM type 1.5 managed as type 1 on insulin  Diabetes A1C 6.3% 11/2022, 6.4% 02/2022, 9.4% 07/2021, 6.6% 04/2021, 6.4% 1/2020  Again Education, diet, exercise. Risk, complication, treatment. INsulin 70/30 25u in morning and evening only 15  Ave bg <120. Denies hypoglycemia. HTN:   Go back to Metoprolol and hctz 12.5mg     Depression, anxiety - stable  Mother had depression. Is seeing Therapist Dr. Haider Dennis. Denies SI or HI.

## 2023-06-09 NOTE — PROGRESS NOTES
Chief Complaint   Patient presents with    Weight Loss     Discuss weight loss surgery       1. Have you been to the ER, urgent care clinic since your last visit? Hospitalized since your last visit? No    2. Have you seen or consulted any other health care providers outside of the [address] [place] [address] since your last visit? Include any pap smears or colon screening.  No

## 2023-06-17 DIAGNOSIS — E13.9 DIABETES 1.5, MANAGED AS TYPE 1 (HCC): ICD-10-CM

## 2023-06-19 DIAGNOSIS — E13.9 DIABETES 1.5, MANAGED AS TYPE 1 (HCC): ICD-10-CM

## 2023-06-19 RX ORDER — INSULIN ASPART 100 [IU]/ML
INJECTION, SUSPENSION SUBCUTANEOUS
Refills: 1 | OUTPATIENT
Start: 2023-06-19

## 2023-06-19 NOTE — TELEPHONE ENCOUNTER
Duplicate      For Pharmacy Admin Tracking Only    Program: Medication Refill  CPA in place:    Recommendation Provided To:    Intervention Detail: Discontinued Rx: 1, reason: Duplicate Therapy  Intervention Accepted By:   Linette Bars Closed?:    Time Spent (min): 5

## 2023-06-26 ENCOUNTER — TELEPHONE (OUTPATIENT)
Age: 38
End: 2023-06-26

## 2023-06-26 DIAGNOSIS — E13.9 DIABETES 1.5, MANAGED AS TYPE 1 (HCC): Primary | ICD-10-CM

## 2023-06-26 RX ORDER — INSULIN HUMAN 100 [IU]/ML
INJECTION, SUSPENSION SUBCUTANEOUS
Qty: 13 ADJUSTABLE DOSE PRE-FILLED PEN SYRINGE | Refills: 1 | Status: SHIPPED | OUTPATIENT
Start: 2023-06-26

## 2023-06-29 DIAGNOSIS — E13.9 DIABETES 1.5, MANAGED AS TYPE 1 (HCC): Primary | ICD-10-CM

## 2023-06-29 RX ORDER — HUMAN INSULIN 100 [IU]/ML
INJECTION, SUSPENSION SUBCUTANEOUS
Qty: 5 ADJUSTABLE DOSE PRE-FILLED PEN SYRINGE | Refills: 3 | Status: SHIPPED | OUTPATIENT
Start: 2023-06-29

## 2023-06-30 ENCOUNTER — TELEPHONE (OUTPATIENT)
Age: 38
End: 2023-06-30

## 2023-06-30 ENCOUNTER — TELEMEDICINE (OUTPATIENT)
Age: 38
End: 2023-06-30
Payer: COMMERCIAL

## 2023-06-30 DIAGNOSIS — E13.9 DIABETES 1.5, MANAGED AS TYPE 1 (HCC): Primary | ICD-10-CM

## 2023-06-30 PROCEDURE — 99213 OFFICE O/P EST LOW 20 MIN: CPT | Performed by: FAMILY MEDICINE

## 2023-08-15 ENCOUNTER — TELEMEDICINE (OUTPATIENT)
Age: 38
End: 2023-08-15
Payer: COMMERCIAL

## 2023-08-15 DIAGNOSIS — I10 PRIMARY HYPERTENSION: ICD-10-CM

## 2023-08-15 DIAGNOSIS — Z30.09 FAMILY PLANNING COUNSELING: ICD-10-CM

## 2023-08-15 DIAGNOSIS — E13.9 DIABETES 1.5, MANAGED AS TYPE 1 (HCC): Primary | ICD-10-CM

## 2023-08-15 PROCEDURE — 99213 OFFICE O/P EST LOW 20 MIN: CPT | Performed by: FAMILY MEDICINE

## 2023-08-15 RX ORDER — LABETALOL 100 MG/1
100 TABLET, FILM COATED ORAL 2 TIMES DAILY
Qty: 60 TABLET | Refills: 0 | Status: SHIPPED | OUTPATIENT
Start: 2023-08-15

## 2023-08-15 NOTE — PROGRESS NOTES
Consent: Ileana Coffey, who was seen by synchronous (real-time) audio-video technology, and/or her healthcare decision maker, is aware that this patient-initiated, Telehealth encounter on 8/15/2023 is a billable service, with coverage as determined by her insurance carrier. She is aware that she may receive a bill and has provided verbal consent to proceed: Yes. Ileana Coffey is a 45 y.o. female    No LMP recorded. 08/11/2023    Unsure if she wants to be pregnant. Currently in a relationship. 2 pregnancy, 1 6yo child. Still looking for a new job. Have new insurance    She didn't do our last lab order. Last lab 11/2022    Refuses covid vaccine        has a past medical history of Asthma, Depression with anxiety, Diabetes 1.5, managed as type 1 ([address]), Essential hypertension, Gastroesophageal reflux disease without esophagitis, History of seasonal allergies, IBS (irritable bowel syndrome), Non-compliance, Psychotic disorder ([address]), Sciatica, right side, and Smoker. Diabetes since 04/2021   DM type 1.5 managed as type 1 on insulin  Diabetes A1C 6.3% 11/2022, 6.4% 02/2022, 9.4% 07/2021, 6.6% 04/2021, 6.4% 1/2020  Again Education, diet, exercise. Risk, complication, treatment. INsulin 70/30 25u in morning and evening only 15  Ave bg <120. Denies hypoglycemia. As above, lost her job, but still have her current insurance. I've sent in 3 different kinds of 70/30 including one she have been on for years. Insurance probl. She still have a few pens. We discussed [facility] insulin    HTN:   Metoprolol and hctz 12.5mg - b/c of possible trying to get pregnant. We'll change to labetalol and nifedipine     Depression, anxiety - stable  Mother had depression. Is seeing Therapist Dr. Perry Henning. Denies SI or HI. Lexapro 20mg daily  Clonazepam prn last wrote #20 01/06/2021, she still have left over.   - d/deep  Continue lexapro     GERD: d/c ppi     Smoker: was put on wellbutrin SR, \"doesn't like

## 2023-08-15 NOTE — PROGRESS NOTES
Chief Complaint   Patient presents with    Follow-up Chronic Condition       1. Have you been to the ER, urgent care clinic since your last visit? Hospitalized since your last visit? No    2. Have you seen or consulted any other health care providers outside of the [address] [place] [address] since your last visit? Include any pap smears or colon screening.  No

## 2023-08-16 ENCOUNTER — TELEPHONE (OUTPATIENT)
Age: 38
End: 2023-08-16

## 2023-08-16 NOTE — TELEPHONE ENCOUNTER
Pt needs a different medication (it is not covered by her insurance)       Re: insulin aspart protamine-insulin aspart (NOVOLOG 70/30) injection pen     [facility]     Best number to reach her is [phone]

## 2023-08-17 RX ORDER — HUMAN INSULIN 100 [IU]/ML
INJECTION, SUSPENSION SUBCUTANEOUS
Qty: 5 ADJUSTABLE DOSE PRE-FILLED PEN SYRINGE | Refills: 3 | Status: SHIPPED | OUTPATIENT
Start: 2023-08-17

## 2023-08-17 RX ORDER — INSULIN HUMAN 100 [IU]/ML
INJECTION, SUSPENSION SUBCUTANEOUS
Qty: 13 ADJUSTABLE DOSE PRE-FILLED PEN SYRINGE | Refills: 1 | Status: SHIPPED | OUTPATIENT
Start: 2023-08-17

## 2023-08-22 LAB
ALBUMIN SERPL-MCNC: 4 G/DL (ref 3.5–5)
ALBUMIN/GLOB SERPL: 1.1 (ref 1.1–2.2)
ALP SERPL-CCNC: 97 U/L (ref 45–117)
ALT SERPL-CCNC: 74 U/L (ref 12–78)
ANION GAP SERPL CALC-SCNC: 6 MMOL/L (ref 5–15)
AST SERPL-CCNC: 44 U/L (ref 15–37)
BILIRUB SERPL-MCNC: 0.5 MG/DL (ref 0.2–1)
BUN SERPL-MCNC: 12 MG/DL (ref 6–20)
BUN/CREAT SERPL: 15 (ref 12–20)
CALCIUM SERPL-MCNC: 9 MG/DL (ref 8.5–10.1)
CHLORIDE SERPL-SCNC: 107 MMOL/L (ref 97–108)
CO2 SERPL-SCNC: 24 MMOL/L (ref 21–32)
CREAT SERPL-MCNC: 0.82 MG/DL (ref 0.55–1.02)
EST. AVERAGE GLUCOSE BLD GHB EST-MCNC: 151 MG/DL
GLOBULIN SER CALC-MCNC: 3.7 G/DL (ref 2–4)
GLUCOSE SERPL-MCNC: 149 MG/DL (ref 65–100)
HBA1C MFR BLD: 6.9 % (ref 4–5.6)
POTASSIUM SERPL-SCNC: 4 MMOL/L (ref 3.5–5.1)
PROT SERPL-MCNC: 7.7 G/DL (ref 6.4–8.2)
SODIUM SERPL-SCNC: 137 MMOL/L (ref 136–145)

## 2023-09-05 ENCOUNTER — OFFICE VISIT (OUTPATIENT)
Age: 38
End: 2023-09-05
Payer: COMMERCIAL

## 2023-09-05 VITALS
RESPIRATION RATE: 16 BRPM | OXYGEN SATURATION: 98 % | SYSTOLIC BLOOD PRESSURE: 140 MMHG | HEART RATE: 76 BPM | HEIGHT: 63 IN | BODY MASS INDEX: 42.98 KG/M2 | WEIGHT: 242.6 LBS | TEMPERATURE: 99.5 F | DIASTOLIC BLOOD PRESSURE: 88 MMHG

## 2023-09-05 DIAGNOSIS — Z79.899 ENCOUNTER FOR LONG-TERM (CURRENT) USE OF MEDICATIONS: ICD-10-CM

## 2023-09-05 DIAGNOSIS — F41.8 DEPRESSION WITH ANXIETY: ICD-10-CM

## 2023-09-05 DIAGNOSIS — E13.9 DIABETES 1.5, MANAGED AS TYPE 1 (HCC): Primary | ICD-10-CM

## 2023-09-05 DIAGNOSIS — I10 PRIMARY HYPERTENSION: ICD-10-CM

## 2023-09-05 PROCEDURE — 3077F SYST BP >= 140 MM HG: CPT | Performed by: FAMILY MEDICINE

## 2023-09-05 PROCEDURE — 99214 OFFICE O/P EST MOD 30 MIN: CPT | Performed by: FAMILY MEDICINE

## 2023-09-05 PROCEDURE — 3044F HG A1C LEVEL LT 7.0%: CPT | Performed by: FAMILY MEDICINE

## 2023-09-05 PROCEDURE — 3079F DIAST BP 80-89 MM HG: CPT | Performed by: FAMILY MEDICINE

## 2023-09-05 RX ORDER — ESCITALOPRAM OXALATE 20 MG/1
20 TABLET ORAL DAILY
Qty: 90 TABLET | Refills: 1 | Status: SHIPPED | OUTPATIENT
Start: 2023-09-05

## 2023-09-05 RX ORDER — INSULIN HUMAN 100 [IU]/ML
INJECTION, SUSPENSION SUBCUTANEOUS
Qty: 13 ADJUSTABLE DOSE PRE-FILLED PEN SYRINGE | Refills: 1 | Status: SHIPPED | OUTPATIENT
Start: 2023-09-05

## 2023-09-05 RX ORDER — DULAGLUTIDE 0.75 MG/.5ML
0.75 INJECTION, SOLUTION SUBCUTANEOUS WEEKLY
Qty: 2 ADJUSTABLE DOSE PRE-FILLED PEN SYRINGE | Refills: 0 | Status: SHIPPED | OUTPATIENT
Start: 2023-09-05

## 2023-09-05 RX ORDER — LABETALOL 100 MG/1
100 TABLET, FILM COATED ORAL 2 TIMES DAILY
Qty: 180 TABLET | Refills: 1 | Status: SHIPPED | OUTPATIENT
Start: 2023-09-05

## 2023-09-05 NOTE — PROGRESS NOTES
Chief Complaint   Patient presents with    Diabetes    Hypertension     3 week check    1. Have you been to the ER, urgent care clinic since your last visit? Hospitalized since your last visit? No    2. Have you seen or consulted any other health care providers outside of the [address] [place] [address] Avenue since your last visit? Include any pap smears or colon screening.  No

## 2023-09-05 NOTE — PROGRESS NOTES
Carlos Maldonado is a 45 y.o. female     Patient's last menstrual period was 08/09/2023 (exact date). Refuses covid vaccine       has a past medical history of Asthma, Depression with anxiety, Diabetes 1.5, managed as type 1 ([address]), Essential hypertension, Gastroesophageal reflux disease without esophagitis, History of seasonal allergies, IBS (irritable bowel syndrome), Non-compliance, Psychotic disorder ([address]), Sciatica, right side, and Smoker. Assessment/Plans:    Venita was seen today for diabetes and hypertension. Diagnoses and all orders for this visit:    Diabetes 1.5, managed as type 1 ([address])  -     Dulaglutide (TRULICITY) 5.65 ZN/0.7EC SOPN; Inject 0.75 mg into the skin once a week  -     Insulin NPH Isophane & Regular (HUMULIN 70/30 KWIKPEN) (70-30) 100 UNIT per ML injection pen; 25u SQ with breakfast and 15u SQ with dinner.  -     Comprehensive Metabolic Panel; Future  -     Hemoglobin A1C; Future  -     CBC; Future  -     TSH; Future  -     Microalbumin / Creatinine Urine Ratio; Future  -     Lipid Panel; Future    Primary hypertension  -     labetalol (NORMODYNE) 100 MG tablet; Take 1 tablet by mouth 2 times daily  -     Comprehensive Metabolic Panel; Future  -     TSH; Future    Depression with anxiety  -     escitalopram (LEXAPRO) 20 MG tablet; Take 1 tablet by mouth daily F/up in office 3 wks  -     Comprehensive Metabolic Panel; Future  -     CBC; Future  -     TSH; Future    BMI 40.0-44.9, adult (HCC)  -     Dulaglutide (TRULICITY) 9.97 VO/0.6AQ SOPN; Inject 0.75 mg into the skin once a week    Encounter for long-term (current) use of medications  -     Comprehensive Metabolic Panel; Future  -     Hemoglobin A1C; Future  -     CBC; Future  -     TSH; Future  -     Microalbumin / Creatinine Urine Ratio; Future  -     Lipid Panel; Future      Discussed plans, risk/benefits of treatments/observations. Through the use of shared decision making, above plans were agreed upon.    Medication

## 2023-09-19 DIAGNOSIS — E13.9 DIABETES 1.5, MANAGED AS TYPE 1 (HCC): Primary | ICD-10-CM

## 2023-09-19 RX ORDER — SEMAGLUTIDE 1.34 MG/ML
0.5 INJECTION, SOLUTION SUBCUTANEOUS WEEKLY
Qty: 2 ADJUSTABLE DOSE PRE-FILLED PEN SYRINGE | Refills: 1 | Status: SHIPPED | OUTPATIENT
Start: 2023-09-19

## 2023-09-20 ENCOUNTER — TELEPHONE (OUTPATIENT)
Age: 38
End: 2023-09-20

## 2023-09-20 NOTE — TELEPHONE ENCOUNTER
Karlie ramsay -     [facility] Staff 17 hours ago (3:13 PM)     CK  Appointment Request From: Penny Homans     With Provider: Quyen Alva MD [place] [id number] [place] [facility]]     Preferred Date Range: 9/20/2023 - 9/22/2023     Preferred Times: Any Time     Reason for visit: Request an Appointment     Comments:  my blood pressure medicine is not working, also the trulicity was rejected

## 2023-09-21 NOTE — TELEPHONE ENCOUNTER
Spoke with patient. She wants to go back to previous BP med. Labetalol is making her dizzy, have a headache and nausea. She said that her BP is still running high. She is going to keep a log of readings.

## 2023-09-22 NOTE — TELEPHONE ENCOUNTER
MD Cait Blue LPN  Caller: Unspecified (2 days ago,  8:34 AM)  Issues is that you're pregnant, we can't do meds that may affect the pregnancy. Dizziness can also be due to your pregnancy. Please see OBGYN. F/up w us once your pregnancy is complete.      Bekah Head MD     9/21/2023

## 2023-09-22 NOTE — TELEPHONE ENCOUNTER
Spoke with patient. Informed patient per Dr. Elayne Sicard note. She isn't pregnant, last cycle 9/9/23, and not trying at this time. She would like to switch back to previous meds or something else. She said that she will keep track of cycle encase of pregnancy.

## 2023-09-25 DIAGNOSIS — I10 PRIMARY HYPERTENSION: ICD-10-CM

## 2023-09-25 RX ORDER — METOPROLOL SUCCINATE 25 MG/1
25 TABLET, EXTENDED RELEASE ORAL DAILY
Qty: 90 TABLET | Refills: 0 | Status: SHIPPED | OUTPATIENT
Start: 2023-09-25

## 2023-09-25 RX ORDER — HYDROCHLOROTHIAZIDE 12.5 MG/1
12.5 TABLET ORAL DAILY
Qty: 90 TABLET | Refills: 0 | Status: SHIPPED | OUTPATIENT
Start: 2023-09-25

## 2023-10-17 DIAGNOSIS — E13.9 DIABETES 1.5, MANAGED AS TYPE 1 (HCC): Primary | ICD-10-CM

## 2023-10-17 RX ORDER — DULAGLUTIDE 1.5 MG/.5ML
1.5 INJECTION, SOLUTION SUBCUTANEOUS WEEKLY
Qty: 4 ADJUSTABLE DOSE PRE-FILLED PEN SYRINGE | Refills: 1 | Status: SHIPPED | OUTPATIENT
Start: 2023-10-17

## 2023-12-03 ENCOUNTER — HOSPITAL ENCOUNTER (EMERGENCY)
Facility: HOSPITAL | Age: 38
Discharge: HOME | End: 2023-12-03
Payer: COMMERCIAL

## 2023-12-03 VITALS
WEIGHT: 253 LBS | HEIGHT: 63 IN | TEMPERATURE: 98.4 F | DIASTOLIC BLOOD PRESSURE: 108 MMHG | RESPIRATION RATE: 18 BRPM | OXYGEN SATURATION: 98 % | HEART RATE: 92 BPM | BODY MASS INDEX: 44.83 KG/M2 | SYSTOLIC BLOOD PRESSURE: 136 MMHG

## 2023-12-03 DIAGNOSIS — M54.16 LUMBAR RADICULOPATHY: Primary | ICD-10-CM

## 2023-12-03 PROCEDURE — 96372 THER/PROPH/DIAG INJ SC/IM: CPT

## 2023-12-03 PROCEDURE — 99284 EMERGENCY DEPT VISIT MOD MDM: CPT

## 2023-12-03 PROCEDURE — 6370000000 HC RX 637 (ALT 250 FOR IP): Performed by: NURSE PRACTITIONER

## 2023-12-03 PROCEDURE — 6360000002 HC RX W HCPCS: Performed by: NURSE PRACTITIONER

## 2023-12-03 RX ORDER — METHOCARBAMOL 750 MG/1
750 TABLET, FILM COATED ORAL 4 TIMES DAILY
Qty: 40 TABLET | Refills: 0 | Status: SHIPPED | OUTPATIENT
Start: 2023-12-03 | End: 2023-12-13

## 2023-12-03 RX ORDER — DEXAMETHASONE SODIUM PHOSPHATE 4 MG/ML
4 INJECTION, SOLUTION INTRA-ARTICULAR; INTRALESIONAL; INTRAMUSCULAR; INTRAVENOUS; SOFT TISSUE ONCE
Status: DISCONTINUED | OUTPATIENT
Start: 2023-12-03 | End: 2023-12-03

## 2023-12-03 RX ORDER — KETOROLAC TROMETHAMINE 30 MG/ML
30 INJECTION, SOLUTION INTRAMUSCULAR; INTRAVENOUS ONCE
Status: COMPLETED | OUTPATIENT
Start: 2023-12-03 | End: 2023-12-03

## 2023-12-03 RX ORDER — MELOXICAM 7.5 MG/1
7.5 TABLET ORAL DAILY
Qty: 30 TABLET | Refills: 0 | Status: SHIPPED | OUTPATIENT
Start: 2023-12-03

## 2023-12-03 RX ORDER — DIAZEPAM 5 MG/1
5 TABLET ORAL ONCE
Status: COMPLETED | OUTPATIENT
Start: 2023-12-03 | End: 2023-12-03

## 2023-12-03 RX ORDER — KETOROLAC TROMETHAMINE 30 MG/ML
30 INJECTION, SOLUTION INTRAMUSCULAR; INTRAVENOUS ONCE
Status: DISCONTINUED | OUTPATIENT
Start: 2023-12-03 | End: 2023-12-03

## 2023-12-03 RX ADMIN — DIAZEPAM 5 MG: 5 TABLET ORAL at 14:54

## 2023-12-03 RX ADMIN — KETOROLAC TROMETHAMINE 30 MG: 30 INJECTION, SOLUTION INTRAMUSCULAR; INTRAVENOUS at 14:52

## 2023-12-03 ASSESSMENT — PAIN DESCRIPTION - DESCRIPTORS: DESCRIPTORS: ACHING

## 2023-12-03 ASSESSMENT — PAIN - FUNCTIONAL ASSESSMENT: PAIN_FUNCTIONAL_ASSESSMENT: 0-10

## 2023-12-03 ASSESSMENT — PAIN DESCRIPTION - ORIENTATION: ORIENTATION: LOWER;RIGHT

## 2023-12-03 ASSESSMENT — PAIN SCALES - GENERAL: PAINLEVEL_OUTOF10: 8

## 2023-12-03 ASSESSMENT — PAIN DESCRIPTION - LOCATION: LOCATION: BACK

## 2023-12-03 NOTE — ED NOTES
Discharge instructions given to patient by NP and RN. Pt has been given counseling regarding at home treatment plan. Pt verbalizes understanding of need to seek further treatment if symptoms worsen. Pt ambulated off of unit in no signs of distress.        Negro Ogden RN  12/03/23 9678

## 2023-12-03 NOTE — DISCHARGE INSTRUCTIONS
It was a pleasure taking care of you at [facility] today. We know that when you come to [facility], you are entrusting us with your health, comfort, and safety. Our physicians and nurses honor that trust, and we truly appreciate the opportunity to care for you and your loved ones. We also value our feedback. If you receive a survey about your Emergency Department experience today, please fill it out. We care about our patients' feedback, and we listen to what you have to say. Thank you!

## 2023-12-03 NOTE — ED PROVIDER NOTES
[facility] EMERGENCY DEPT  EMERGENCY DEPARTMENT ENCOUNTER       Pt Name: Jose L Eli  MRN: [id number]  [address] 1985  Date of evaluation: 12/3/2023  Provider: Rosemary Kenny NP   PCP: Dulce Maria Langford MD  Note Started: 2:43 PM EST 12/3/23     CHIEF COMPLAINT       Chief Complaint   Patient presents with    Back Pain     Pt reports R lower back pain x 3 weeks, pain travels down the leg. Has had this pain in the past.No injury         HISTORY OF PRESENT ILLNESS: 1 or more elements      History From: Patient  HPI Limitations: None     Venita Meyers is a 45 y.o. female who presents for back pain. Onset 3 weeks ago. Located to lower back. Patient reports history of sciatica and at times she has flares. States she has been taking ibuprofen 800 mg consistently with no relief. Pain radiates to the right leg. Denies extremity weakness, numbness, tingling, incontinence. She has never been evaluated by orthopedics for her sciatica. Reports hypertension prior to symptom onset. Nursing Notes were all reviewed and agreed with or any disagreements were addressed in the HPI. REVIEW OF SYSTEMS      Review of Systems     Positives and Pertinent negatives as per HPI. PAST HISTORY     Past Medical History:  Past Medical History:   Diagnosis Date    Asthma     No problems as adult or teenager--last problems as child    Depression with anxiety 1/30/2019    Diabetes 1.5, managed as type 1 ([address]) 2/18/2022    Essential hypertension 1/30/2019    Gastroesophageal reflux disease without esophagitis 3/10/2017    History of seasonal allergies     Allegra or Claritin--equally effective.     IBS (irritable bowel syndrome)     Non-compliance 1/6/2020    Psychotic disorder ([address])     Sciatica, right side 3/10/2017    Smoker 2/23/2016       Past Surgical History:  Past Surgical History:   Procedure Laterality Date    GYN      PAP SMEAR  10/01/2011       Family History:  Family History   Problem Relation Age of Onset    No

## 2023-12-07 ENCOUNTER — CARE COORDINATION (OUTPATIENT)
Facility: CLINIC | Age: 38
End: 2023-12-07

## 2023-12-07 DIAGNOSIS — J45.909 ASTHMA, UNSPECIFIED ASTHMA SEVERITY, UNSPECIFIED WHETHER COMPLICATED, UNSPECIFIED WHETHER PERSISTENT: ICD-10-CM

## 2023-12-07 DIAGNOSIS — E13.9 DIABETES 1.5, MANAGED AS TYPE 1 (HCC): ICD-10-CM

## 2023-12-07 DIAGNOSIS — I10 PRIMARY HYPERTENSION: Primary | ICD-10-CM

## 2023-12-07 NOTE — PROGRESS NOTES
Remote Patient Monitoring Treatment Plan    Received request from ACM/CTN Yuliana Onofre RN  to order remote patient monitoring for in home monitoring of Diabetes, HTN, and Asthma and order completed. Patient will be monitoring blood pressure   glucose  pulse ox   weight  survey questions  Please set alert for ONLY weight gain of 5# in 7 days. Pt has no documented hx of HF. [facility] Patient will engage in Remote Patient Monitoring each day to develop the skills necessary for self management. RPM Care Team Responsibilities:   Alerts will be reviewed daily and addressed within 2-4 hours during operational hours (Monday -Friday 9 am-4 pm)  Alert response and intervention documented in patient medical record  Alert response escalated to PCP per protocol and documented in patient medical record  Patient monitored over approximately  days  Discharge from program based on self-management readiness    See care coordination encounters for additional details.

## 2023-12-07 NOTE — CARE COORDINATION
Remote Patient Kit Ordering Note      Date/Time:  12/7/2023 9:02 AM      [x] [facility] confirmed patient shipping address  [x] Patient will receive package over the next 1-3 business days. Someone 21 years or older must be present to sign for UPS delivery. [x] HRS will contact patient within 24 hours, an [address] [place] [address] will call the patient directly: If the patient does not answer, HRS will follow up with the clinical team notifying them about the unsuccessful attempt to contact the patient. HRS will make three call attempts to the patient. Provide patient with [facility] Virtual install number is: [phone]. [x] ACM will contact patient once equipment is active to welcome them to the program.                                                         [x] Hours of RPM monitoring - Monday-Friday 4389-1079; encourage patient to get vitals entered by [facility] each day to have the alert addressed same day. [x]CCSS mailed RPM Patient flyer to patient. All questions answered at this time. ACM made aware the RPM kit has been ordered. [facility]

## 2024-01-12 ENCOUNTER — CARE COORDINATION (OUTPATIENT)
Facility: CLINIC | Age: 39
End: 2024-01-12

## 2024-01-12 DIAGNOSIS — I10 PRIMARY HYPERTENSION: Primary | ICD-10-CM

## 2024-01-12 DIAGNOSIS — E13.9 DIABETES 1.5, MANAGED AS TYPE 1 (HCC): ICD-10-CM

## 2024-01-12 DIAGNOSIS — J45.909 ASTHMA, UNSPECIFIED ASTHMA SEVERITY, UNSPECIFIED WHETHER COMPLICATED, UNSPECIFIED WHETHER PERSISTENT: ICD-10-CM

## 2024-01-12 NOTE — CARE COORDINATION
CCSS placed call to patient to arrange RPM kit  through UPS.     Reviewed with patient how to pack equipment in original packing.     Verified patient's availability to schedule UPS  time.     UPS  time requested. Anticipated  date range 1/16/24-1/18/24

## 2024-01-12 NOTE — PROGRESS NOTES
Remote Patient Order Discontinued    Received request from Nayana Connell RN  to discontinue order for remote patient monitoring of Diabetes, HTN, and Asthma and order completed.

## 2024-01-22 DIAGNOSIS — I10 PRIMARY HYPERTENSION: ICD-10-CM

## 2024-01-24 RX ORDER — METOPROLOL SUCCINATE 25 MG/1
25 TABLET, EXTENDED RELEASE ORAL DAILY
Qty: 90 TABLET | Refills: 0 | Status: SHIPPED | OUTPATIENT
Start: 2024-01-24

## 2024-01-24 NOTE — TELEPHONE ENCOUNTER
Last appointment: 9/15/23  Next appointment: 1/30/24  Previous refill encounter(s): 9/25/23 #90    Requested Prescriptions     Pending Prescriptions Disp Refills    metoprolol succinate (TOPROL XL) 25 MG extended release tablet [Pharmacy Med Name: METOPROLOL SUCC ER 25 MG TAB] 90 tablet 1     Sig: Take 1 tablet by mouth daily         For Pharmacy Admin Tracking Only    Program: Medication Refill  CPA in place:    Recommendation Provided To:   Intervention Detail: New Rx: 1, reason: Patient Preference  Intervention Accepted By:   Gap Closed?:    Time Spent (min): 5

## 2024-01-24 NOTE — TELEPHONE ENCOUNTER
Pt needs woody needles for her humalin     Completely out of them     Also, wants refill for omeprazole     [facility]    Best number to reach her is [phone]

## 2024-01-26 NOTE — TELEPHONE ENCOUNTER
Pt is checking to see if her pen needles were called in.    Pt also is checking on her Omeprazole need to be called in. Former prescription not showing on medication list    [facility]    PT# [phone]

## 2024-01-30 ENCOUNTER — OFFICE VISIT (OUTPATIENT)
Age: 39
End: 2024-01-30
Payer: COMMERCIAL

## 2024-01-30 VITALS
HEART RATE: 92 BPM | OXYGEN SATURATION: 98 % | RESPIRATION RATE: 18 BRPM | DIASTOLIC BLOOD PRESSURE: 79 MMHG | WEIGHT: 230.2 LBS | SYSTOLIC BLOOD PRESSURE: 113 MMHG | TEMPERATURE: 96.1 F | BODY MASS INDEX: 40.79 KG/M2 | HEIGHT: 63 IN

## 2024-01-30 DIAGNOSIS — F32.A DEPRESSIVE DISORDER IN REMISSION: ICD-10-CM

## 2024-01-30 DIAGNOSIS — E13.9 DIABETES 1.5, MANAGED AS TYPE 1 (HCC): Primary | ICD-10-CM

## 2024-01-30 DIAGNOSIS — Z79.899 ENCOUNTER FOR LONG-TERM (CURRENT) USE OF MEDICATIONS: ICD-10-CM

## 2024-01-30 DIAGNOSIS — Z71.3 WEIGHT LOSS COUNSELING, ENCOUNTER FOR: ICD-10-CM

## 2024-01-30 DIAGNOSIS — K21.9 GASTROESOPHAGEAL REFLUX DISEASE, UNSPECIFIED WHETHER ESOPHAGITIS PRESENT: ICD-10-CM

## 2024-01-30 DIAGNOSIS — I10 PRIMARY HYPERTENSION: ICD-10-CM

## 2024-01-30 PROCEDURE — 99214 OFFICE O/P EST MOD 30 MIN: CPT | Performed by: FAMILY MEDICINE

## 2024-01-30 PROCEDURE — 3078F DIAST BP <80 MM HG: CPT | Performed by: FAMILY MEDICINE

## 2024-01-30 PROCEDURE — 3074F SYST BP LT 130 MM HG: CPT | Performed by: FAMILY MEDICINE

## 2024-01-30 RX ORDER — METOPROLOL SUCCINATE 25 MG/1
25 TABLET, EXTENDED RELEASE ORAL DAILY
Qty: 90 TABLET | Refills: 1 | Status: SHIPPED | OUTPATIENT
Start: 2024-01-30

## 2024-01-30 RX ORDER — HYDROCHLOROTHIAZIDE 12.5 MG/1
12.5 TABLET ORAL DAILY
Qty: 90 TABLET | Refills: 1 | Status: SHIPPED | OUTPATIENT
Start: 2024-01-30

## 2024-01-30 RX ORDER — OMEPRAZOLE 20 MG/1
20 CAPSULE, DELAYED RELEASE ORAL
Qty: 30 CAPSULE | Refills: 2 | OUTPATIENT
Start: 2024-01-30

## 2024-01-30 RX ORDER — INSULIN HUMAN 100 [IU]/ML
INJECTION, SUSPENSION SUBCUTANEOUS
Qty: 12 ADJUSTABLE DOSE PRE-FILLED PEN SYRINGE | Refills: 1 | Status: SHIPPED | OUTPATIENT
Start: 2024-01-30

## 2024-01-30 RX ORDER — OMEPRAZOLE 20 MG/1
20 CAPSULE, DELAYED RELEASE ORAL DAILY PRN
Qty: 90 CAPSULE | Refills: 0 | Status: SHIPPED | OUTPATIENT
Start: 2024-01-30

## 2024-01-30 RX ORDER — ACETAMINOPHEN AND CODEINE PHOSPHATE 120; 12 MG/5ML; MG/5ML
1 SOLUTION ORAL DAILY
COMMUNITY
Start: 2024-01-04

## 2024-01-30 RX ORDER — DULAGLUTIDE 1.5 MG/.5ML
INJECTION, SOLUTION SUBCUTANEOUS
Qty: 2 ADJUSTABLE DOSE PRE-FILLED PEN SYRINGE | Refills: 5 | Status: SHIPPED | OUTPATIENT
Start: 2024-01-30

## 2024-01-30 ASSESSMENT — ANXIETY QUESTIONNAIRES
GAD7 TOTAL SCORE: 0
6. BECOMING EASILY ANNOYED OR IRRITABLE: 0
5. BEING SO RESTLESS THAT IT IS HARD TO SIT STILL: 0
4. TROUBLE RELAXING: 0
2. NOT BEING ABLE TO STOP OR CONTROL WORRYING: 0
3. WORRYING TOO MUCH ABOUT DIFFERENT THINGS: 0
IF YOU CHECKED OFF ANY PROBLEMS ON THIS QUESTIONNAIRE, HOW DIFFICULT HAVE THESE PROBLEMS MADE IT FOR YOU TO DO YOUR WORK, TAKE CARE OF THINGS AT HOME, OR GET ALONG WITH OTHER PEOPLE: NOT DIFFICULT AT ALL
1. FEELING NERVOUS, ANXIOUS, OR ON EDGE: 0
7. FEELING AFRAID AS IF SOMETHING AWFUL MIGHT HAPPEN: 0

## 2024-01-30 ASSESSMENT — PATIENT HEALTH QUESTIONNAIRE - PHQ9
8. MOVING OR SPEAKING SO SLOWLY THAT OTHER PEOPLE COULD HAVE NOTICED. OR THE OPPOSITE, BEING SO FIGETY OR RESTLESS THAT YOU HAVE BEEN MOVING AROUND A LOT MORE THAN USUAL: 0
4. FEELING TIRED OR HAVING LITTLE ENERGY: 0
3. TROUBLE FALLING OR STAYING ASLEEP: 0
6. FEELING BAD ABOUT YOURSELF - OR THAT YOU ARE A FAILURE OR HAVE LET YOURSELF OR YOUR FAMILY DOWN: 0
SUM OF ALL RESPONSES TO PHQ QUESTIONS 1-9: 0
SUM OF ALL RESPONSES TO PHQ9 QUESTIONS 1 & 2: 0
5. POOR APPETITE OR OVEREATING: 0
7. TROUBLE CONCENTRATING ON THINGS, SUCH AS READING THE NEWSPAPER OR WATCHING TELEVISION: 0
SUM OF ALL RESPONSES TO PHQ QUESTIONS 1-9: 0
9. THOUGHTS THAT YOU WOULD BE BETTER OFF DEAD, OR OF HURTING YOURSELF: 0
10. IF YOU CHECKED OFF ANY PROBLEMS, HOW DIFFICULT HAVE THESE PROBLEMS MADE IT FOR YOU TO DO YOUR WORK, TAKE CARE OF THINGS AT HOME, OR GET ALONG WITH OTHER PEOPLE: 0
1. LITTLE INTEREST OR PLEASURE IN DOING THINGS: 0
SUM OF ALL RESPONSES TO PHQ QUESTIONS 1-9: 0
2. FEELING DOWN, DEPRESSED OR HOPELESS: 0
SUM OF ALL RESPONSES TO PHQ QUESTIONS 1-9: 0

## 2024-01-30 NOTE — PROGRESS NOTES
Chief Complaint   Patient presents with    Diabetes    Depression     Check meds    1. Have you been to the ER, urgent care clinic since your last visit?  Hospitalized since your last visit?Yes ER    2. Have you seen or consulted any other health care providers outside of the [facility] since your last visit?  Include any pap smears or colon screening. No

## 2024-01-30 NOTE — PROGRESS NOTES
Venita Roche is a 38 y.o. female     Patient's last menstrual period was 01/21/2024.    Refuses covid vaccine    [employer]      Assessment/Plans:    Venita was seen today for diabetes and depression.    Diagnoses and all orders for this visit:    Diabetes 1.5, managed as type 1 (HCC)  -     Insulin NPH Isophane & Regular (HUMULIN 70/30 KWIKPEN) (70-30) 100 UNIT per ML injection pen; 22u SQ with breakfast and 22u SQ with dinner.  -     dulaglutide (TRULICITY) 1.5 MG/0.5ML SC injection; INJECT 0.5 ML SUBCUTANEOUSLY ONE TIME PER WEEK    Depressive disorder in remission    Primary hypertension  -     hydroCHLOROthiazide 12.5 MG tablet; Take 1 tablet by mouth daily  -     metoprolol succinate (TOPROL XL) 25 MG extended release tablet; Take 1 tablet by mouth daily    Weight loss counseling, encounter for    Gastroesophageal reflux disease, unspecified whether esophagitis present  -     omeprazole (PRILOSEC) 20 MG delayed release capsule; Take 1 capsule by mouth daily as needed (GERD)    Encounter for long-term (current) use of medications        Discussed plans, risk/benefits of treatments/observations.   Through the use of shared decision making, above plans were agreed upon.   Medication compliance advised.  Patient verbalized understanding.     Return in about 4 months (around 5/30/2024) for DM2, HTN, meds, labs.      Subjective:     has a past medical history of Asthma, Depression with anxiety, Diabetes 1.5, managed as type 1 (HCC), Essential hypertension, Gastroesophageal reflux disease without esophagitis, History of seasonal allergies, IBS (irritable bowel syndrome), Non-compliance, Psychotic disorder (HCC), Sciatica, right side, and Smoker.    She forgot to do her labs.     Lost 12lb in past 4 months    DM type 1.5 managed as type 1 on insulin. Diabetes since 04/2021   Diabetes A1C 6.9% 08/2023, 6.3% 11/2022, 6.4% 02/2022, 9.4% 07/2021, 6.6% 04/2021, 6.4% 1/2020  Education, diet, exercise. Risk,

## 2024-02-13 ENCOUNTER — TELEPHONE (OUTPATIENT)
Age: 39
End: 2024-02-13

## 2024-02-13 NOTE — TELEPHONE ENCOUNTER
----- Message from Tracey Dietz sent at 2/13/2024  8:25 AM EST -----  Subject: Refill Request    QUESTIONS  Name of Medication? dulaglutide (TRULICITY) 1.5 MG/0.5ML SC injection  Patient-reported dosage and instructions? 1 injection per week  How many days do you have left? 0  [id number]  Pharmacy phone number (if available)? [phone]  Additional Information for Provider? Pt has 0 left, pharmacy reached out   to fill script and have not heard back. Please respond asap. Thank you   ---------------------------------------------------------------------------  --------------  CALL BACK INFO  What is the best way for the office to contact you? OK to leave message on   voicemail  Preferred Call Back Phone Number? [phone]  ---------------------------------------------------------------------------  --------------  SCRIPT ANSWERS  Relationship to Patient? Self

## 2024-02-13 NOTE — TELEPHONE ENCOUNTER
Trulicity was sent on 1/30/24. I contacted the pharmacy and was informed the Trulicity is still on backorder. Please advise if an alternative would be appropriate at this time.

## 2024-02-14 ENCOUNTER — TELEPHONE (OUTPATIENT)
Age: 39
End: 2024-02-14

## 2024-02-14 NOTE — TELEPHONE ENCOUNTER
----- Message from Radha Tyler sent at 2/14/2024  3:12 PM EST -----  Subject: Medication Problem     Medication: dulaglutide (TRULICITY) 1.5 MG/0.5ML SC injection  Dosage: n/a  Ordering Provider: Marli Luna    Question[facility]Problem: [facility] and suggested the   patient call around to locate. This writer called Tracey WITH the   patient on the line and learned from Tracey that that this 1.5mg dose   pen has been on national  back order for a couple of months   and suggested a different Rx in lieu OR the 0.75mg pen (potential   insurance issue?) or 3.0mg pen which IS in stock if you want to Rx that.   Please call the patient to advise how to proceed.       Pharmacy: [id number] [address] [phone] - F [phone]    ---------------------------------------------------------------------------  --------------  CALL BACK INFO  [phone]; OK to leave message on voicemail,OK to respond with electronic   message via [employer] portal (only for patients who have registered [employer]   account)  ---------------------------------------------------------------------------  --------------    SCRIPT ANSWERS  Relationship to Patient: Self

## 2024-02-20 ENCOUNTER — TELEPHONE (OUTPATIENT)
Age: 39
End: 2024-02-20

## 2024-02-20 NOTE — TELEPHONE ENCOUNTER
Pls call patient back about Trulicity     States she has been waiting since last week     Best number to reach her is [phone]

## 2024-02-21 RX ORDER — DULAGLUTIDE 0.75 MG/.5ML
0.75 INJECTION, SOLUTION SUBCUTANEOUS WEEKLY
Qty: 2 ML | Refills: 2 | Status: SHIPPED | OUTPATIENT
Start: 2024-02-21

## 2024-03-11 ENCOUNTER — TELEPHONE (OUTPATIENT)
Age: 39
End: 2024-03-11

## 2024-03-11 NOTE — TELEPHONE ENCOUNTER
Pt needs to renew the Trulicity    Pt states that .75 and pt states that that is not working for her    Pt is requesting that he increase it to 1.5    Pt # [phone]    [facility] [place] [facility] [place]

## 2024-03-22 ENCOUNTER — TELEPHONE (OUTPATIENT)
Age: 39
End: 2024-03-22

## 2024-04-19 NOTE — ED NOTES
Called patient to a room, no answer.       Alda Ny, RN  12/03/23 5516 normal/clear to auscultation bilaterally/no wheezes/no rales/no rhonchi

## 2024-05-13 RX ORDER — DULAGLUTIDE 0.75 MG/.5ML
INJECTION, SOLUTION SUBCUTANEOUS
Qty: 2 ML | Refills: 0 | Status: SHIPPED | OUTPATIENT
Start: 2024-05-13

## 2024-05-13 NOTE — TELEPHONE ENCOUNTER
Last appointment: 1/30/24  Next appointment: 5/30/24  Previous refill encounter(s): 2/21/24 #2ml with 2 refills    Requested Prescriptions     Pending Prescriptions Disp Refills    TRULICITY 0.75 MG/0.5ML SOPN SC injection [Pharmacy Med Name: TRULICITY 0.75 MG/0.5 ML PEN] 2 mL 0     Sig: INJECT 0.75 MG SUBCUTANEOUSLY ONE TIME PER WEEK         For Pharmacy Admin Tracking Only    Program: Medication Refill  CPA in place:    Recommendation Provided To:   Intervention Detail: New Rx: 1, reason: Patient Preference  Intervention Accepted By:   Gap Closed?:    Time Spent (min): 5

## 2024-05-28 DIAGNOSIS — Z79.899 ENCOUNTER FOR LONG-TERM (CURRENT) USE OF MEDICATIONS: ICD-10-CM

## 2024-05-28 DIAGNOSIS — I10 PRIMARY HYPERTENSION: ICD-10-CM

## 2024-05-28 DIAGNOSIS — E13.9 DIABETES 1.5, MANAGED AS TYPE 1 (HCC): ICD-10-CM

## 2024-05-28 DIAGNOSIS — F41.8 DEPRESSION WITH ANXIETY: ICD-10-CM

## 2024-05-28 LAB
ALBUMIN SERPL-MCNC: 3.8 G/DL (ref 3.5–5)
ALBUMIN/GLOB SERPL: 1.1 (ref 1.1–2.2)
ALP SERPL-CCNC: 97 U/L (ref 45–117)
ALT SERPL-CCNC: 22 U/L (ref 12–78)
ANION GAP SERPL CALC-SCNC: 4 MMOL/L (ref 5–15)
AST SERPL-CCNC: 12 U/L (ref 15–37)
BILIRUB SERPL-MCNC: 0.5 MG/DL (ref 0.2–1)
BUN SERPL-MCNC: 12 MG/DL (ref 6–20)
BUN/CREAT SERPL: 15 (ref 12–20)
CALCIUM SERPL-MCNC: 9.8 MG/DL (ref 8.5–10.1)
CHLORIDE SERPL-SCNC: 106 MMOL/L (ref 97–108)
CHOLEST SERPL-MCNC: 205 MG/DL
CO2 SERPL-SCNC: 28 MMOL/L (ref 21–32)
CREAT SERPL-MCNC: 0.8 MG/DL (ref 0.55–1.02)
CREAT UR-MCNC: 349 MG/DL
ERYTHROCYTE [DISTWIDTH] IN BLOOD BY AUTOMATED COUNT: 13.5 % (ref 11.5–14.5)
EST. AVERAGE GLUCOSE BLD GHB EST-MCNC: 120 MG/DL
GLOBULIN SER CALC-MCNC: 3.5 G/DL (ref 2–4)
GLUCOSE SERPL-MCNC: 120 MG/DL (ref 65–100)
HBA1C MFR BLD: 5.8 % (ref 4–5.6)
HCT VFR BLD AUTO: 39.7 % (ref 35–47)
HDLC SERPL-MCNC: 56 MG/DL
HDLC SERPL: 3.7 (ref 0–5)
HGB BLD-MCNC: 13.6 G/DL (ref 11.5–16)
LDLC SERPL CALC-MCNC: 126.8 MG/DL (ref 0–100)
MCH RBC QN AUTO: 30.2 PG (ref 26–34)
MCHC RBC AUTO-ENTMCNC: 34.3 G/DL (ref 30–36.5)
MCV RBC AUTO: 88.2 FL (ref 80–99)
MICROALBUMIN UR-MCNC: 1.29 MG/DL
MICROALBUMIN/CREAT UR-RTO: 4 MG/G (ref 0–30)
NRBC # BLD: 0 K/UL (ref 0–0.01)
NRBC BLD-RTO: 0 PER 100 WBC
PLATELET # BLD AUTO: 260 K/UL (ref 150–400)
PMV BLD AUTO: 10 FL (ref 8.9–12.9)
POTASSIUM SERPL-SCNC: 3.9 MMOL/L (ref 3.5–5.1)
PROT SERPL-MCNC: 7.3 G/DL (ref 6.4–8.2)
RBC # BLD AUTO: 4.5 M/UL (ref 3.8–5.2)
SODIUM SERPL-SCNC: 138 MMOL/L (ref 136–145)
SPECIMEN HOLD: NORMAL
TRIGL SERPL-MCNC: 111 MG/DL
TSH SERPL DL<=0.05 MIU/L-ACNC: 5.22 UIU/ML (ref 0.36–3.74)
VLDLC SERPL CALC-MCNC: 22.2 MG/DL
WBC # BLD AUTO: 7.2 K/UL (ref 3.6–11)

## 2024-05-30 ENCOUNTER — OFFICE VISIT (OUTPATIENT)
Age: 39
End: 2024-05-30
Payer: COMMERCIAL

## 2024-05-30 VITALS
RESPIRATION RATE: 18 BRPM | DIASTOLIC BLOOD PRESSURE: 91 MMHG | SYSTOLIC BLOOD PRESSURE: 133 MMHG | TEMPERATURE: 96.4 F | HEIGHT: 63 IN | BODY MASS INDEX: 40.82 KG/M2 | WEIGHT: 230.4 LBS | HEART RATE: 72 BPM | OXYGEN SATURATION: 95 %

## 2024-05-30 DIAGNOSIS — Z79.899 ENCOUNTER FOR LONG-TERM (CURRENT) USE OF MEDICATIONS: ICD-10-CM

## 2024-05-30 DIAGNOSIS — E13.9 DIABETES 1.5, MANAGED AS TYPE 1 (HCC): Primary | ICD-10-CM

## 2024-05-30 DIAGNOSIS — I10 PRIMARY HYPERTENSION: ICD-10-CM

## 2024-05-30 PROCEDURE — 3080F DIAST BP >= 90 MM HG: CPT | Performed by: FAMILY MEDICINE

## 2024-05-30 PROCEDURE — 3074F SYST BP LT 130 MM HG: CPT | Performed by: FAMILY MEDICINE

## 2024-05-30 PROCEDURE — 3044F HG A1C LEVEL LT 7.0%: CPT | Performed by: FAMILY MEDICINE

## 2024-05-30 PROCEDURE — 99213 OFFICE O/P EST LOW 20 MIN: CPT | Performed by: FAMILY MEDICINE

## 2024-05-30 RX ORDER — INSULIN HUMAN 100 [IU]/ML
INJECTION, SUSPENSION SUBCUTANEOUS
Qty: 12 ADJUSTABLE DOSE PRE-FILLED PEN SYRINGE | Refills: 1 | Status: SHIPPED | OUTPATIENT
Start: 2024-05-30

## 2024-05-30 RX ORDER — HYDROCHLOROTHIAZIDE 12.5 MG/1
12.5 TABLET ORAL DAILY
Qty: 90 TABLET | Refills: 1 | Status: SHIPPED | OUTPATIENT
Start: 2024-05-30

## 2024-05-30 RX ORDER — METOPROLOL SUCCINATE 25 MG/1
25 TABLET, EXTENDED RELEASE ORAL DAILY
Qty: 90 TABLET | Refills: 1 | Status: SHIPPED | OUTPATIENT
Start: 2024-05-30

## 2024-05-30 RX ORDER — DULAGLUTIDE 0.75 MG/.5ML
INJECTION, SOLUTION SUBCUTANEOUS
Qty: 2 ML | Refills: 5 | Status: SHIPPED | OUTPATIENT
Start: 2024-05-30

## 2024-05-30 NOTE — PROGRESS NOTES
Venita Roche is a 38 y.o. female     Patient's last menstrual period was 05/23/2024 (approximate).    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      Assessment/Plans:    Venita was seen today for diabetes and hypertension.    Diagnoses and all orders for this visit:    Diabetes 1.5, managed as type 1 (HCC)  -     Insulin NPH Isophane & Regular (HUMULIN 70/30 KWIKPEN) (70-30) 100 UNIT per ML injection pen; 22u SQ with breakfast and 22u SQ with dinner.  -     dulaglutide (TRULICITY) 0.75 MG/0.5ML SOPN SC injection; INJECT 0.75 MG SUBCUTANEOUSLY ONE TIME PER WEEK    Primary hypertension  -     hydroCHLOROthiazide 12.5 MG tablet; Take 1 tablet by mouth daily  -     metoprolol succinate (TOPROL XL) 25 MG extended release tablet; Take 1 tablet by mouth daily    Encounter for long-term (current) use of medications      Discussed plans, risk/benefits of treatments/observations.   Through the use of shared decision making, above plans were agreed upon.   Medication compliance advised.  Patient verbalized understanding.     Return in about 4 months (around 9/30/2024) for DM2, HTN, meds, labs.      Subjective:    Labs reviewed w pt     has a past medical history of Asthma, Depression with anxiety, Diabetes 1.5, managed as type 1 ([facility]), Essential hypertension, Gastroesophageal reflux disease without esophagitis, History of seasonal allergies, IBS (irritable bowel syndrome), Non-compliance, Psychotic disorder ([facility]), Sciatica, right side, and Smoker.    DM type 1.5 managed as type 1 on insulin. Diabetes since 04/2021   Diabetes A1C 5.8% 05/2024, 6.9% 08/2023, 6.3% 11/2022, 6.4% 02/2022, 9.4% 07/2021, 6.6% 04/2021, 6.4% 1/2020  Education, diet, exercise. Risk, complication, treatment.   Insulin 70/30 22u in morning and evening only 12  trulicity 0.75mg     HTN:   Metoprolol 25mg and hctz 12.5mg      Depression, anxiety - stable  Mother had depression.   Is

## 2024-05-30 NOTE — PROGRESS NOTES
Chief Complaint   Patient presents with    Diabetes    Hypertension     4 mo check    1. Have you been to the ER, urgent care clinic since your last visit?  Hospitalized since your last visit?No    2. Have you seen or consulted any other health care providers outside of the [facility] since your last visit?  Include any pap smears or colon screening. No

## 2024-11-26 DIAGNOSIS — I10 PRIMARY HYPERTENSION: ICD-10-CM

## 2024-11-26 RX ORDER — METOPROLOL SUCCINATE 25 MG/1
25 TABLET, EXTENDED RELEASE ORAL DAILY
Qty: 90 TABLET | Refills: 0 | Status: SHIPPED | OUTPATIENT
Start: 2024-11-26

## 2024-11-26 NOTE — TELEPHONE ENCOUNTER
Former pt of Dr Luna    Last appointment: 5/30/24  Next appointment: 2/21/25  Previous refill encounter(s): 5/30/24 #90 with 1 refill    Requested Prescriptions     Pending Prescriptions Disp Refills    metoprolol succinate (TOPROL XL) 25 MG extended release tablet 90 tablet 0     Sig: Take 1 tablet by mouth daily         For Pharmacy Admin Tracking Only    Program: Medication Refill  CPA in place:    Recommendation Provided To:   Intervention Detail: New Rx: 1, reason: Patient Preference  Intervention Accepted By:   Gap Closed?:    Time Spent (min): 5

## 2024-12-04 NOTE — TELEPHONE ENCOUNTER
Patient states that she is still waiting on her RX refill   TRULICITY 1.5 MG/0.5ML SC injection she can be reached @ [phone]

## 2024-12-05 RX ORDER — DULAGLUTIDE 0.75 MG/.5ML
0.75 INJECTION, SOLUTION SUBCUTANEOUS WEEKLY
Qty: 5 ADJUSTABLE DOSE PRE-FILLED PEN SYRINGE | Refills: 2 | OUTPATIENT
Start: 2024-12-05

## 2024-12-06 DIAGNOSIS — E13.9 TYPE 1.5 DIABETES, MANAGED AS TYPE 1 (HCC): Primary | ICD-10-CM

## 2024-12-06 NOTE — TELEPHONE ENCOUNTER
Identified patient 2 identifiers verified.  Patient would like a call back regarding Trulicity prescription at [phone]. Patient requesting a Virtual Visit  to get  refill on Trulicty .Patient was last seen by Dr. Luna.  Patient getting Trulicity for Diabetes. Patient does not have recent Blood work.  Patient uses [phone].  Patient requesting  a call back today , patient informed that the Nurse will speak with Dr. Henderson about getting an appointment or getting a prescription until appointment in Feb.

## 2024-12-10 ENCOUNTER — CLINICAL DOCUMENTATION (OUTPATIENT)
Age: 39
End: 2024-12-10

## 2024-12-10 ENCOUNTER — TELEPHONE (OUTPATIENT)
Age: 39
End: 2024-12-10

## 2024-12-10 NOTE — PROGRESS NOTES
PA initiated for Trulicity 1.5mg/0 5ml     Denied  PA Detail   Note from payer: Your PA request has been denied. Additional information will be provided in the denial communication. the member does not have a documented diagnosis of type 2 diabetes mellitus confirmed by any one of the following: hemoglobin A1C (HbA1C) greater than 6.5%, fasting plasma glucose (FPG) greater than or equal to 126 mg/dL post 8-hour fast, 2-hour plasma glucose greater than or equal to 200 mg/dL during a 75-g oral glucose tolerance test (OGTT), or random plasma glucose greater than or equal to 200 mg/dL with presence of classic symptoms of hyperglycemia (for example, polyuria, polydipsia, polyphagia).Our decision is based on [facility] Medical Policy 056 Glucagon-like Peptide-1 (GLP-1) Receptor Agonists and Related Drugs for the Treatment of Type 2 Diabetes  Payer: [employer] [place] - Commercial Case ID: [id number]  Electronic appeal: Not supported  Prior auth initiated by: [facility]/pharmacy #[id number] [place] [phone] [place] [phone] -   View History  Medication Being Authorized    dulaglutide (TRULICITY) 1.5 MG/0.5ML SC injection  Inject 0.5 mLs into the skin once a week  Dispense: 3 mL Refills: 0   Start: 2024   Class: Normal Diagnoses: 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  To be filled at: [facility]/pharmacy #[id number] [place] [phone] -   Prior Authorization History for dulaglutide (TRULICITY) 1.5 MG/0.5ML SC injection    Yesterday Closed - Prior Authorization not required for patient/medication  Pharmacy Benefits   Open Encounter MARIAM VENCES  -  BRIA BB CDH-Y [id number] ([facility])    Covered: Retail, Mail Order, Specialty    Unknown: Long-Term Care  Member ID: [id number] BIN: [id number] : 1985   Group ID: [id number] PCN:

## 2024-12-10 NOTE — TELEPHONE ENCOUNTER
Identified patient 2 identifiers verified.  Patient would like a call back today [phone] to discuss getting the prior authorization  for Trulicity  be done today.

## 2025-01-14 ENCOUNTER — OFFICE VISIT (OUTPATIENT)
Facility: CLINIC | Age: 40
End: 2025-01-14
Payer: COMMERCIAL

## 2025-01-14 VITALS
DIASTOLIC BLOOD PRESSURE: 84 MMHG | WEIGHT: 242.1 LBS | HEART RATE: 68 BPM | HEIGHT: 63 IN | SYSTOLIC BLOOD PRESSURE: 136 MMHG | BODY MASS INDEX: 42.89 KG/M2 | RESPIRATION RATE: 16 BRPM | TEMPERATURE: 99 F | OXYGEN SATURATION: 98 %

## 2025-01-14 DIAGNOSIS — F41.8 DEPRESSION WITH ANXIETY: ICD-10-CM

## 2025-01-14 DIAGNOSIS — F17.200 SMOKER: ICD-10-CM

## 2025-01-14 DIAGNOSIS — E13.9 DIABETES 1.5, MANAGED AS TYPE 1 (HCC): Primary | ICD-10-CM

## 2025-01-14 DIAGNOSIS — E66.01 SEVERE OBESITY: ICD-10-CM

## 2025-01-14 DIAGNOSIS — I10 PRIMARY HYPERTENSION: ICD-10-CM

## 2025-01-14 DIAGNOSIS — L30.9 ECZEMA OF BOTH HANDS: ICD-10-CM

## 2025-01-14 PROBLEM — F33.0 DEPRESSION, MAJOR, RECURRENT, MILD (HCC): Status: RESOLVED | Noted: 2020-01-06 | Resolved: 2025-01-14

## 2025-01-14 PROBLEM — Z91.199 NON-COMPLIANCE: Status: RESOLVED | Noted: 2020-01-06 | Resolved: 2025-01-14

## 2025-01-14 PROCEDURE — 3079F DIAST BP 80-89 MM HG: CPT | Performed by: STUDENT IN AN ORGANIZED HEALTH CARE EDUCATION/TRAINING PROGRAM

## 2025-01-14 PROCEDURE — 99204 OFFICE O/P NEW MOD 45 MIN: CPT | Performed by: STUDENT IN AN ORGANIZED HEALTH CARE EDUCATION/TRAINING PROGRAM

## 2025-01-14 PROCEDURE — 3075F SYST BP GE 130 - 139MM HG: CPT | Performed by: STUDENT IN AN ORGANIZED HEALTH CARE EDUCATION/TRAINING PROGRAM

## 2025-01-14 RX ORDER — METOPROLOL SUCCINATE 25 MG/1
25 TABLET, EXTENDED RELEASE ORAL DAILY
Qty: 90 TABLET | Refills: 0 | Status: SHIPPED | OUTPATIENT
Start: 2025-01-14

## 2025-01-14 RX ORDER — TRIAMCINOLONE ACETONIDE 0.25 MG/G
OINTMENT TOPICAL
Qty: 454 G | Refills: 0 | Status: SHIPPED | OUTPATIENT
Start: 2025-01-14 | End: 2025-01-21

## 2025-01-14 SDOH — ECONOMIC STABILITY: FOOD INSECURITY: WITHIN THE PAST 12 MONTHS, YOU WORRIED THAT YOUR FOOD WOULD RUN OUT BEFORE YOU GOT MONEY TO BUY MORE.: NEVER TRUE

## 2025-01-14 SDOH — ECONOMIC STABILITY: FOOD INSECURITY: WITHIN THE PAST 12 MONTHS, THE FOOD YOU BOUGHT JUST DIDN'T LAST AND YOU DIDN'T HAVE MONEY TO GET MORE.: NEVER TRUE

## 2025-01-14 ASSESSMENT — PATIENT HEALTH QUESTIONNAIRE - PHQ9
SUM OF ALL RESPONSES TO PHQ QUESTIONS 1-9: 0
SUM OF ALL RESPONSES TO PHQ QUESTIONS 1-9: 0
5. POOR APPETITE OR OVEREATING: NOT AT ALL
7. TROUBLE CONCENTRATING ON THINGS, SUCH AS READING THE NEWSPAPER OR WATCHING TELEVISION: NOT AT ALL
10. IF YOU CHECKED OFF ANY PROBLEMS, HOW DIFFICULT HAVE THESE PROBLEMS MADE IT FOR YOU TO DO YOUR WORK, TAKE CARE OF THINGS AT HOME, OR GET ALONG WITH OTHER PEOPLE: NOT DIFFICULT AT ALL
8. MOVING OR SPEAKING SO SLOWLY THAT OTHER PEOPLE COULD HAVE NOTICED. OR THE OPPOSITE, BEING SO FIGETY OR RESTLESS THAT YOU HAVE BEEN MOVING AROUND A LOT MORE THAN USUAL: NOT AT ALL
SUM OF ALL RESPONSES TO PHQ QUESTIONS 1-9: 0
SUM OF ALL RESPONSES TO PHQ9 QUESTIONS 1 & 2: 0
SUM OF ALL RESPONSES TO PHQ QUESTIONS 1-9: 0
1. LITTLE INTEREST OR PLEASURE IN DOING THINGS: NOT AT ALL
2. FEELING DOWN, DEPRESSED OR HOPELESS: NOT AT ALL
4. FEELING TIRED OR HAVING LITTLE ENERGY: NOT AT ALL
3. TROUBLE FALLING OR STAYING ASLEEP: NOT AT ALL
6. FEELING BAD ABOUT YOURSELF - OR THAT YOU ARE A FAILURE OR HAVE LET YOURSELF OR YOUR FAMILY DOWN: NOT AT ALL
9. THOUGHTS THAT YOU WOULD BE BETTER OFF DEAD, OR OF HURTING YOURSELF: NOT AT ALL

## 2025-01-14 NOTE — ASSESSMENT & PLAN NOTE
Patient has significantly cut back on her smoking, roughly 4 cigarettes/day.  I congratulated her.  Will revisit topic during next office visit.

## 2025-01-14 NOTE — ASSESSMENT & PLAN NOTE
Patient experiencing eczema symptoms on her hands bilaterally.  We reviewed ways to help improve symptoms, including but not limited to using lukewarm water, washing primarily with the soap on the palmar aspect of her hands, using emollient.  Given severity of symptoms, will trial patient on short course of triamcinolone cream, directions to discontinue after 2 weeks.  Return precaution reviewed.

## 2025-01-14 NOTE — ASSESSMENT & PLAN NOTE
Most recent A1c 5.8% ( 5/2024).  A1c collected today.  Patient has been off of her Humulin and Trulicity since October.  At this time will await A1c results prior to restarting medication, her blood glucose readings at home have averaged around 110.  Will refer to ophthalmology to complete diabetic eye exam.  Lifestyle modification encouraged with regular exercise and weight loss.  Follow-up in 3 months with repeat A1c.

## 2025-01-14 NOTE — ASSESSMENT & PLAN NOTE
- BP in office today 136/84  - At goal BP < 140/90, metoprolol refills sent to pharmacy.   - Encourage DASH diet and regular exercise, periodically monitor BP at home, call if elevated or symptomatic

## 2025-01-14 NOTE — ASSESSMENT & PLAN NOTE
BMI in office today 42.89. Provided counseling on lifestyle intervention - establishing healthier eating habits and regular exercise routine; Goal: 5% weight loss. Educational Material provided. Will revisit topic during next office visit.

## 2025-01-14 NOTE — PROGRESS NOTES
Venita Roche is a 39 y.o. female     Chief Complaint   Patient presents with    Diabetes     Eczema       /84 (Site: Left Upper Arm, Position: Sitting, Cuff Size: Large Adult)   Pulse 68   Temp 99 °F (37.2 °C) (Oral)   Resp 16   Ht 1.6 m (5' 3\")   Wt 109.8 kg (242 lb 1.6 oz)   SpO2 98%   BMI 42.89 kg/m²     Health Maintenance Due   Topic Date Due    Diabetic foot exam  Never done    Varicella vaccine (1 of 2 - 13+ 2-dose series) Never done    Diabetic retinal exam  Never done    Hepatitis C screen  Never done    Hepatitis B vaccine (2 of 3 - 3-dose series) 08/02/2003    DTaP/Tdap/Td vaccine (1 - Tdap) Never done    Cervical cancer screen  Never done    Flu vaccine (1) Never done    COVID-19 Vaccine (1 - 2023-24 season) Never done    Depression Monitoring  01/30/2025         \"Have you been to the ER, urgent care clinic since your last visit?  Hospitalized since your last visit?\"    NO    “Have you seen or consulted any other health care providers outside of [facility] since your last visit?”    NO        “Have you had a pap smear?”    NO    No cervical cancer screening on file                   
for 15.0 years (3.8 ttl pk-yrs)        Types: Cigarettes      Smokeless tobacco: Never           Health Maintenance   Topic Date Due    Diabetic foot exam  Never done    Varicella vaccine (1 of 2 - 13+ 2-dose series) Never done    Diabetic retinal exam  Never done    Hepatitis C screen  Never done    Hepatitis B vaccine (2 of 3 - 3-dose series) 08/02/2003    DTaP/Tdap/Td vaccine (1 - Tdap) Never done    Cervical cancer screen  Never done    Flu vaccine (1) Never done    COVID-19 Vaccine (1 - 2023-24 season) Never done    Depression Monitoring  01/30/2025    Pneumococcal 0-64 years Vaccine (1 of 2 - PCV) 12/31/2025 (Originally 7/14/1991)    A1C test (Diabetic or Prediabetic)  05/28/2025    Diabetic Alb to Cr ratio (uACR) test  05/28/2025    Lipids  05/28/2025    GFR test (Diabetes, CKD 3-4, OR last GFR 15-59)  05/28/2025    HIV screen  Completed    Hepatitis A vaccine  Aged Out    Hib vaccine  Aged Out    HPV vaccine  Aged Out    Polio vaccine  Aged Out    Meningococcal (ACWY) vaccine  Aged Out        Immunization History   Administered Date(s) Administered    Hepatitis B vaccine 07/05/2003    Tst, Unspecified Formulation 07/05/2003        Past Medical History:   Diagnosis Date    Asthma     No problems as adult or teenager--last problems as child    Depression with anxiety 1/30/2019    Diabetes 1.5, managed as type 1 (HCC) 2/18/2022    Essential hypertension 1/30/2019    Gastroesophageal reflux disease without esophagitis 3/10/2017    History of seasonal allergies     Allegra or Claritin--equally effective.    IBS (irritable bowel syndrome)     Non-compliance 1/6/2020    Psychotic disorder (HCC)     Sciatica, right side 3/10/2017    Smoker 2/23/2016     Past Surgical History:   Procedure Laterality Date    GYN      PAP SMEAR  10/01/2011     Social History     Socioeconomic History    Marital status: Single     Spouse name: None    Number of children: None    Years of education: college    Highest education level:

## 2025-01-14 NOTE — PATIENT INSTRUCTIONS
The following medication/s has been sent to your pharmacy:  metoprolol     You have been referred to: ophthalmology   **Please allow up to 2 weeks for their office to call you and schedule. If you have not heard from them beyond 2 weeks, contact their office directly to schedule an appointment.

## 2025-01-14 NOTE — ASSESSMENT & PLAN NOTE
History of anxiety and depression, previously taking antidepressant medication and followed with a therapist.  Overall her mood has been stable, will continue to closely monitor.

## 2025-01-16 LAB
ANION GAP SERPL CALC-SCNC: 6 MMOL/L (ref 2–12)
BASOPHILS # BLD: 0.06 K/UL (ref 0–0.1)
BASOPHILS NFR BLD: 0.9 % (ref 0–1)
BUN SERPL-MCNC: 10 MG/DL (ref 6–20)
BUN/CREAT SERPL: 12 (ref 12–20)
CALCIUM SERPL-MCNC: 9.4 MG/DL (ref 8.5–10.1)
CHLORIDE SERPL-SCNC: 109 MMOL/L (ref 97–108)
CHOLEST SERPL-MCNC: 188 MG/DL
CO2 SERPL-SCNC: 23 MMOL/L (ref 21–32)
CREAT SERPL-MCNC: 0.84 MG/DL (ref 0.55–1.02)
CREAT UR-MCNC: 411 MG/DL
DIFFERENTIAL METHOD BLD: NORMAL
EOSINOPHIL # BLD: 0.12 K/UL (ref 0–0.4)
EOSINOPHIL NFR BLD: 1.8 % (ref 0–7)
ERYTHROCYTE [DISTWIDTH] IN BLOOD BY AUTOMATED COUNT: 13.5 % (ref 11.5–14.5)
EST. AVERAGE GLUCOSE BLD GHB EST-MCNC: 148 MG/DL
GLUCOSE SERPL-MCNC: 127 MG/DL (ref 65–100)
HBA1C MFR BLD: 6.8 % (ref 4–5.6)
HCT VFR BLD AUTO: 37.1 % (ref 35–47)
HDLC SERPL-MCNC: 51 MG/DL
HDLC SERPL: 3.7 (ref 0–5)
HGB BLD-MCNC: 12.3 G/DL (ref 11.5–16)
IMM GRANULOCYTES # BLD AUTO: 0.01 K/UL (ref 0–0.04)
IMM GRANULOCYTES NFR BLD AUTO: 0.2 % (ref 0–0.5)
LDLC SERPL CALC-MCNC: 108 MG/DL (ref 0–100)
LYMPHOCYTES # BLD: 2.64 K/UL (ref 0.8–3.5)
LYMPHOCYTES NFR BLD: 39.9 % (ref 12–49)
MCH RBC QN AUTO: 29.9 PG (ref 26–34)
MCHC RBC AUTO-ENTMCNC: 33.2 G/DL (ref 30–36.5)
MCV RBC AUTO: 90 FL (ref 80–99)
MICROALBUMIN UR-MCNC: 5.83 MG/DL
MICROALBUMIN/CREAT UR-RTO: 14 MG/G (ref 0–30)
MONOCYTES # BLD: 0.54 K/UL (ref 0–1)
MONOCYTES NFR BLD: 8.2 % (ref 5–13)
NEUTS SEG # BLD: 3.25 K/UL (ref 1.8–8)
NEUTS SEG NFR BLD: 49 % (ref 32–75)
NRBC # BLD: 0 K/UL (ref 0–0.01)
NRBC BLD-RTO: 0 PER 100 WBC
PLATELET # BLD AUTO: 273 K/UL (ref 150–400)
PMV BLD AUTO: 10.4 FL (ref 8.9–12.9)
POTASSIUM SERPL-SCNC: 4.2 MMOL/L (ref 3.5–5.1)
RBC # BLD AUTO: 4.12 M/UL (ref 3.8–5.2)
SODIUM SERPL-SCNC: 138 MMOL/L (ref 136–145)
TRIGL SERPL-MCNC: 145 MG/DL
VLDLC SERPL CALC-MCNC: 29 MG/DL
WBC # BLD AUTO: 6.6 K/UL (ref 3.6–11)

## 2025-01-17 ENCOUNTER — TELEPHONE (OUTPATIENT)
Facility: CLINIC | Age: 40
End: 2025-01-17

## 2025-01-22 DIAGNOSIS — L30.9 ECZEMA OF BOTH HANDS: Primary | ICD-10-CM

## 2025-01-22 DIAGNOSIS — E13.9 TYPE 1.5 DIABETES, MANAGED AS TYPE 1 (HCC): ICD-10-CM

## 2025-01-22 RX ORDER — DESONIDE 0.5 MG/G
CREAM TOPICAL
Qty: 60 G | Refills: 1 | Status: SHIPPED | OUTPATIENT
Start: 2025-01-22

## 2025-02-03 ENCOUNTER — TELEPHONE (OUTPATIENT)
Facility: CLINIC | Age: 40
End: 2025-02-03

## 2025-02-03 ENCOUNTER — TELEMEDICINE (OUTPATIENT)
Facility: CLINIC | Age: 40
End: 2025-02-03
Payer: COMMERCIAL

## 2025-02-03 DIAGNOSIS — E13.9 DIABETES 1.5, MANAGED AS TYPE 1 (HCC): Primary | ICD-10-CM

## 2025-02-03 PROCEDURE — 3044F HG A1C LEVEL LT 7.0%: CPT | Performed by: STUDENT IN AN ORGANIZED HEALTH CARE EDUCATION/TRAINING PROGRAM

## 2025-02-03 PROCEDURE — 99214 OFFICE O/P EST MOD 30 MIN: CPT | Performed by: STUDENT IN AN ORGANIZED HEALTH CARE EDUCATION/TRAINING PROGRAM

## 2025-02-03 ASSESSMENT — PATIENT HEALTH QUESTIONNAIRE - PHQ9
SUM OF ALL RESPONSES TO PHQ QUESTIONS 1-9: 0
8. MOVING OR SPEAKING SO SLOWLY THAT OTHER PEOPLE COULD HAVE NOTICED. OR THE OPPOSITE, BEING SO FIGETY OR RESTLESS THAT YOU HAVE BEEN MOVING AROUND A LOT MORE THAN USUAL: NOT AT ALL
9. THOUGHTS THAT YOU WOULD BE BETTER OFF DEAD, OR OF HURTING YOURSELF: NOT AT ALL
2. FEELING DOWN, DEPRESSED OR HOPELESS: NOT AT ALL
7. TROUBLE CONCENTRATING ON THINGS, SUCH AS READING THE NEWSPAPER OR WATCHING TELEVISION: NOT AT ALL
4. FEELING TIRED OR HAVING LITTLE ENERGY: NOT AT ALL
SUM OF ALL RESPONSES TO PHQ9 QUESTIONS 1 & 2: 0
5. POOR APPETITE OR OVEREATING: NOT AT ALL
1. LITTLE INTEREST OR PLEASURE IN DOING THINGS: NOT AT ALL
10. IF YOU CHECKED OFF ANY PROBLEMS, HOW DIFFICULT HAVE THESE PROBLEMS MADE IT FOR YOU TO DO YOUR WORK, TAKE CARE OF THINGS AT HOME, OR GET ALONG WITH OTHER PEOPLE: NOT DIFFICULT AT ALL
SUM OF ALL RESPONSES TO PHQ QUESTIONS 1-9: 0
SUM OF ALL RESPONSES TO PHQ QUESTIONS 1-9: 0
3. TROUBLE FALLING OR STAYING ASLEEP: NOT AT ALL
SUM OF ALL RESPONSES TO PHQ QUESTIONS 1-9: 0
6. FEELING BAD ABOUT YOURSELF - OR THAT YOU ARE A FAILURE OR HAVE LET YOURSELF OR YOUR FAMILY DOWN: NOT AT ALL

## 2025-02-03 NOTE — PATIENT INSTRUCTIONS
Please contact your pharmacy to better determine prior authorization status, in the event you have additional questions or concerns please contact our office.

## 2025-02-03 NOTE — TELEPHONE ENCOUNTER
Patient is calling and states that she just finished her VV with Dr. Montelongo and that her insurance is requiring a Prior Auth for a medication that was just sent in for her.

## 2025-02-03 NOTE — ASSESSMENT & PLAN NOTE
Most recent A1c 6.8% (1/28/25).  Patient has been off of her Humulin and Trulicity since October.  She is still having difficulty getting her Trulicity covered by her insurance, prior authorization requested.  Upon chart review, we have completed the prior authorization request.  Directed patient to contact the pharmacy to have the medication reprocessed and to let us know if further follow-up is needed. Patient was referred to ophthalmology to complete diabetic eye exam.  Lifestyle modification encouraged with regular exercise and weight loss. Keep scheduled follow up.

## 2025-02-03 NOTE — PROGRESS NOTES
Venita Roche , was evaluated through a synchronous (real-time) audio-video encounter. The patient (or guardian if applicable) is aware that this is a billable service, which includes applicable co-pays. This Virtual Visit was conducted with patient's (and/or legal guardian's) consent. Patient identification was verified, and a caregiver was present when appropriate.   [facility]  [facility]        Venita Roche a 39 y.o. female  has a past medical history of Asthma, Depression with anxiety, Diabetes 1.5, managed as type 1 ([facility]), Essential hypertension, Gastroesophageal reflux disease without esophagitis, History of seasonal allergies, IBS (irritable bowel syndrome), Non-compliance, Psychotic disorder ([facility]), Sciatica, right side, and Smoker. presents virtually today for medication refills.     Subjective:    Diabetes Mellitus/ Morbid Obesity  - last A1c: 6.8%, 1/28/25  - current meds: Humulin 70/30, 22u daily; trulicity   - takes meds as prescribed: no, Patient inquires about Trulicity refills.  She notes that for the past 3 weeks she has been trying to get her medication approved at her pharmacy, however it states that prior authorization is needed.  - statin use: no high risk  - home readings: 109- 111  - symptomatic lows: no  - last diabetic eye exam: never completed   - follows with podiatry:no  - checks feet daily: yes, no skin breakdown, no ulcers  - peripheral neuropathy: denies  - follows with endocrine: no   - Pneumonia vaccine status: past due          Health Maintenance   Topic Date Due    Diabetic foot exam  Never done    Varicella vaccine (1 of 2 - 13+ 2-dose series) Never done    Diabetic retinal exam  Never done    Hepatitis C screen  Never done    Hepatitis B vaccine (2 of 3 - 3-dose series) 08/02/2003    Cervical cancer screen  Never done    Flu vaccine (1) Never done    COVID-19 Vaccine (1 - 2023-24 season) Never done    Pneumococcal 0-64

## 2025-02-04 ENCOUNTER — TELEPHONE (OUTPATIENT)
Facility: CLINIC | Age: 40
End: 2025-02-04

## 2025-02-04 NOTE — TELEPHONE ENCOUNTER
Spoke with Dr Montelongo verbally and let her know patient would like the insulin she used to take sent to pharmacy today since trulicity was denied. Thankyou! [facility], [address] On file.      Insulin NPH Isophane & Regular (HUMULIN 70/30 KWIKPEN) (70-30) 100 UNIT per ML injection pen

## 2025-02-05 DIAGNOSIS — E13.9 DIABETES 1.5, MANAGED AS TYPE 1 (HCC): Primary | ICD-10-CM

## 2025-02-05 RX ORDER — INSULIN HUMAN 100 [IU]/ML
INJECTION, SUSPENSION SUBCUTANEOUS
Qty: 5 ADJUSTABLE DOSE PRE-FILLED PEN SYRINGE | Refills: 3 | Status: SHIPPED | OUTPATIENT
Start: 2025-02-05

## 2025-02-05 NOTE — TELEPHONE ENCOUNTER
Spoke with patient this morning, all questions fully answered. Prescription with refills has been sent.

## 2025-04-15 ENCOUNTER — OFFICE VISIT (OUTPATIENT)
Facility: CLINIC | Age: 40
End: 2025-04-15
Payer: COMMERCIAL

## 2025-04-15 VITALS
OXYGEN SATURATION: 96 % | BODY MASS INDEX: 42.51 KG/M2 | WEIGHT: 240 LBS | SYSTOLIC BLOOD PRESSURE: 152 MMHG | HEART RATE: 80 BPM | DIASTOLIC BLOOD PRESSURE: 100 MMHG

## 2025-04-15 DIAGNOSIS — L30.9 ECZEMA OF BOTH HANDS: ICD-10-CM

## 2025-04-15 DIAGNOSIS — E11.9 TYPE 2 DIABETES MELLITUS WITHOUT COMPLICATION, WITH LONG-TERM CURRENT USE OF INSULIN: ICD-10-CM

## 2025-04-15 DIAGNOSIS — Z79.4 TYPE 2 DIABETES MELLITUS WITHOUT COMPLICATION, WITH LONG-TERM CURRENT USE OF INSULIN: ICD-10-CM

## 2025-04-15 DIAGNOSIS — I10 PRIMARY HYPERTENSION: Primary | ICD-10-CM

## 2025-04-15 PROCEDURE — 3080F DIAST BP >= 90 MM HG: CPT | Performed by: STUDENT IN AN ORGANIZED HEALTH CARE EDUCATION/TRAINING PROGRAM

## 2025-04-15 PROCEDURE — 3044F HG A1C LEVEL LT 7.0%: CPT | Performed by: STUDENT IN AN ORGANIZED HEALTH CARE EDUCATION/TRAINING PROGRAM

## 2025-04-15 PROCEDURE — 3077F SYST BP >= 140 MM HG: CPT | Performed by: STUDENT IN AN ORGANIZED HEALTH CARE EDUCATION/TRAINING PROGRAM

## 2025-04-15 PROCEDURE — 99214 OFFICE O/P EST MOD 30 MIN: CPT | Performed by: STUDENT IN AN ORGANIZED HEALTH CARE EDUCATION/TRAINING PROGRAM

## 2025-04-15 ASSESSMENT — PATIENT HEALTH QUESTIONNAIRE - PHQ9
SUM OF ALL RESPONSES TO PHQ QUESTIONS 1-9: 0
9. THOUGHTS THAT YOU WOULD BE BETTER OFF DEAD, OR OF HURTING YOURSELF: NOT AT ALL
SUM OF ALL RESPONSES TO PHQ QUESTIONS 1-9: 0
7. TROUBLE CONCENTRATING ON THINGS, SUCH AS READING THE NEWSPAPER OR WATCHING TELEVISION: NOT AT ALL
5. POOR APPETITE OR OVEREATING: NOT AT ALL
3. TROUBLE FALLING OR STAYING ASLEEP: NOT AT ALL
SUM OF ALL RESPONSES TO PHQ QUESTIONS 1-9: 0
1. LITTLE INTEREST OR PLEASURE IN DOING THINGS: NOT AT ALL
2. FEELING DOWN, DEPRESSED OR HOPELESS: NOT AT ALL
10. IF YOU CHECKED OFF ANY PROBLEMS, HOW DIFFICULT HAVE THESE PROBLEMS MADE IT FOR YOU TO DO YOUR WORK, TAKE CARE OF THINGS AT HOME, OR GET ALONG WITH OTHER PEOPLE: NOT DIFFICULT AT ALL
8. MOVING OR SPEAKING SO SLOWLY THAT OTHER PEOPLE COULD HAVE NOTICED. OR THE OPPOSITE, BEING SO FIGETY OR RESTLESS THAT YOU HAVE BEEN MOVING AROUND A LOT MORE THAN USUAL: NOT AT ALL
4. FEELING TIRED OR HAVING LITTLE ENERGY: NOT AT ALL
6. FEELING BAD ABOUT YOURSELF - OR THAT YOU ARE A FAILURE OR HAVE LET YOURSELF OR YOUR FAMILY DOWN: NOT AT ALL
SUM OF ALL RESPONSES TO PHQ QUESTIONS 1-9: 0

## 2025-04-15 ASSESSMENT — ANXIETY QUESTIONNAIRES
5. BEING SO RESTLESS THAT IT IS HARD TO SIT STILL: NOT AT ALL
2. NOT BEING ABLE TO STOP OR CONTROL WORRYING: NOT AT ALL
3. WORRYING TOO MUCH ABOUT DIFFERENT THINGS: NOT AT ALL
6. BECOMING EASILY ANNOYED OR IRRITABLE: NOT AT ALL
IF YOU CHECKED OFF ANY PROBLEMS ON THIS QUESTIONNAIRE, HOW DIFFICULT HAVE THESE PROBLEMS MADE IT FOR YOU TO DO YOUR WORK, TAKE CARE OF THINGS AT HOME, OR GET ALONG WITH OTHER PEOPLE: NOT DIFFICULT AT ALL
4. TROUBLE RELAXING: NOT AT ALL
7. FEELING AFRAID AS IF SOMETHING AWFUL MIGHT HAPPEN: NOT AT ALL
GAD7 TOTAL SCORE: 0
1. FEELING NERVOUS, ANXIOUS, OR ON EDGE: NOT AT ALL

## 2025-04-15 NOTE — PATIENT INSTRUCTIONS
You have been referred to: derm  **Please allow up to 2 weeks for their office to call you and schedule. If you have not heard from them beyond 2 weeks, contact their office directly to schedule an appointment.        Please let me know if you are not able to schedule with dermatology    Contact your insurance to see what insulin and GLP-1 agonist products they cover.

## 2025-04-15 NOTE — PROGRESS NOTES
Venita Roche a 39 y.o. female  has a past medical history of Asthma, Depression with anxiety, Diabetes 1.5, managed as type 1 ([facility]), Essential hypertension, Gastroesophageal reflux disease without esophagitis, History of seasonal allergies, IBS (irritable bowel syndrome), Non-compliance, Psychotic disorder (HCC), Sciatica, right side, and Smoker. presents to office today for DM follow up.     Subjective:    History of Present Illness    Eczema of Both Hands   She reports that her prescribed eczema cream has been ineffective, leading to discontinuation of its use. She has been managing the condition with hydrocortisone, which she believes is providing some relief. The eczema is characterized by peeling skin, fissures, and a change in color. The condition is exacerbated by dryness, to the extent that it impedes her hand mobility due to cracking. She also experiences similar symptoms on her feet, although less severe than on her hands. She reports no dry eyes but admits to mild dry mouth. She has not introduced any new medications or products since the onset of these symptoms. This is her first experience with such symptoms. She reports no joint pain or rashes. She has been using the prescribed cream for 2 weeks before discontinuing it. She does not have a dermatologist. She has not started using any new products. She has been applying hydrocortisone to the affected areas. She reports no family history of autoimmune disorders such as lupus, Sjogren's syndrome, or rheumatoid arthritis that she is aware of.    DM2  She was diagnosed with type 2 diabetes and has been experiencing issues with her medication approval. She received a letter stating that she had been prescribed a GLP-1 medication, which she did not take due to adverse reactions including nausea and inability to function; as a result a PA has been requested to refill her Trulicity. She was previously on Trulicity until her doctor left the practice, she

## 2025-04-16 LAB
ALBUMIN SERPL-MCNC: 4.2 G/DL (ref 3.5–5)
ALBUMIN/GLOB SERPL: 1.2 (ref 1.1–2.2)
ALP SERPL-CCNC: 147 U/L (ref 45–117)
ALT SERPL-CCNC: 83 U/L (ref 12–78)
ANION GAP SERPL CALC-SCNC: 8 MMOL/L (ref 2–12)
AST SERPL-CCNC: 33 U/L (ref 15–37)
BILIRUB SERPL-MCNC: 0.3 MG/DL (ref 0.2–1)
BUN SERPL-MCNC: 8 MG/DL (ref 6–20)
BUN/CREAT SERPL: 9 (ref 12–20)
CALCIUM SERPL-MCNC: 9.4 MG/DL (ref 8.5–10.1)
CHLORIDE SERPL-SCNC: 103 MMOL/L (ref 97–108)
CO2 SERPL-SCNC: 23 MMOL/L (ref 21–32)
CREAT SERPL-MCNC: 0.9 MG/DL (ref 0.55–1.02)
EST. AVERAGE GLUCOSE BLD GHB EST-MCNC: 243 MG/DL
GLOBULIN SER CALC-MCNC: 3.5 G/DL (ref 2–4)
GLUCOSE SERPL-MCNC: 368 MG/DL (ref 65–100)
HBA1C MFR BLD: 10.1 % (ref 4–5.6)
POTASSIUM SERPL-SCNC: 4.5 MMOL/L (ref 3.5–5.1)
PROT SERPL-MCNC: 7.7 G/DL (ref 6.4–8.2)
SODIUM SERPL-SCNC: 134 MMOL/L (ref 136–145)

## 2025-04-18 ENCOUNTER — RESULTS FOLLOW-UP (OUTPATIENT)
Facility: CLINIC | Age: 40
End: 2025-04-18

## 2025-04-19 NOTE — RESULT ENCOUNTER NOTE
Please contact patient to ensure she has scheduled with dermatology and ensure that she was able to fill her diabetes medications.

## 2025-04-25 NOTE — RESULT ENCOUNTER NOTE
Thank you for the update. I see she also has an endocrinology appointment set up for June. At this time, unless she has any additional questions or concerns that she would like to follow up on the appointment can be cancelled. Recommendations to follow up in 6 months.

## 2025-05-07 DIAGNOSIS — I10 PRIMARY HYPERTENSION: ICD-10-CM

## 2025-05-07 RX ORDER — METOPROLOL SUCCINATE 25 MG/1
25 TABLET, EXTENDED RELEASE ORAL DAILY
Qty: 90 TABLET | Refills: 1 | Status: SHIPPED | OUTPATIENT
Start: 2025-05-07

## 2025-05-07 NOTE — TELEPHONE ENCOUNTER
PCP: Ree Montelongo MD    Last appt: 4/15/2025    Future Appointments   Date Time Provider Department Center   6/10/2025  2:50 PM Milton Yin [facility]       Requested Prescriptions     Pending Prescriptions Disp Refills    metoprolol succinate (TOPROL XL) 25 MG extended release tablet [Pharmacy Med Name: METOPROLOL SUCC ER 25 MG TAB] 90 tablet 0     Sig: TAKE 1 TABLET BY MOUTH EVERY DAY

## 2025-05-12 DIAGNOSIS — E11.9 TYPE 2 DIABETES MELLITUS WITHOUT COMPLICATION, WITH LONG-TERM CURRENT USE OF INSULIN (HCC): ICD-10-CM

## 2025-05-12 DIAGNOSIS — Z79.4 TYPE 2 DIABETES MELLITUS WITHOUT COMPLICATION, WITH LONG-TERM CURRENT USE OF INSULIN (HCC): ICD-10-CM

## 2025-06-05 ENCOUNTER — TELEPHONE (OUTPATIENT)
Facility: CLINIC | Age: 40
End: 2025-06-05

## 2025-06-05 DIAGNOSIS — E11.9 TYPE 2 DIABETES MELLITUS WITHOUT COMPLICATION, WITH LONG-TERM CURRENT USE OF INSULIN (HCC): Primary | ICD-10-CM

## 2025-06-05 DIAGNOSIS — Z79.4 TYPE 2 DIABETES MELLITUS WITHOUT COMPLICATION, WITH LONG-TERM CURRENT USE OF INSULIN (HCC): Primary | ICD-10-CM

## 2025-06-05 NOTE — TELEPHONE ENCOUNTER
Approved  PA Detail   Prior authorization approved  Payer: [employer] [place] - Commercial Case ID: [id number]  Note from payer: Your PA request has been approved. Additional information will be provided in the approval communication.  Approval Details    Authorized from June 5, 2025 to June 4, 2026  Electronic appeal: Not supported  Prior auth initiated by: [facility]/pharmacy #[id number] - [facility] [address] [facility] [phone] - [phone]  View History  Medication Being Authorized    dulaglutide (TRULICITY) 1.5 MG/0.5ML SC injection

## 2025-06-23 DIAGNOSIS — L30.9 ECZEMA OF BOTH HANDS: ICD-10-CM

## 2025-06-23 DIAGNOSIS — E13.9 DIABETES 1.5, MANAGED AS TYPE 1 (HCC): ICD-10-CM

## 2025-06-24 RX ORDER — INSULIN HUMAN 100 [IU]/ML
INJECTION, SUSPENSION SUBCUTANEOUS
Qty: 3 ADJUSTABLE DOSE PRE-FILLED PEN SYRINGE | Refills: 0 | Status: SHIPPED | OUTPATIENT
Start: 2025-06-24

## 2025-06-24 RX ORDER — DESONIDE 0.5 MG/G
CREAM TOPICAL 2 TIMES DAILY
Qty: 60 G | Refills: 0 | Status: SHIPPED | OUTPATIENT
Start: 2025-06-24

## 2025-06-24 NOTE — TELEPHONE ENCOUNTER
PCP: Ree Montelongo MD    Last appt: 4/15/2025    No future appointments.    Requested Prescriptions     Pending Prescriptions Disp Refills    Insulin NPH Isophane & Regular (HUMULIN 70/30 KWIKPEN) (70-30) 100 UNIT per ML injection pen [Pharmacy Med Name: HUMULIN 70/30 KWIKPEN]  3     Sig: INJECT 22 UNITS SUBCUTANEOUSLY DAILY    desonide (DESOWEN) 0.05 % cream [Pharmacy Med Name: DESONIDE 0.05% CREAM] 60 g 1     Sig: APPLY TO AFFECTED AREA TWICE A DAY

## 2025-08-02 DIAGNOSIS — E13.9 DIABETES 1.5, MANAGED AS TYPE 1 (HCC): ICD-10-CM

## 2025-08-06 RX ORDER — INSULIN HUMAN 100 [IU]/ML
INJECTION, SUSPENSION SUBCUTANEOUS
Qty: 3 ADJUSTABLE DOSE PRE-FILLED PEN SYRINGE | Refills: 3 | Status: SHIPPED | OUTPATIENT
Start: 2025-08-06

## 2025-08-20 ENCOUNTER — OFFICE VISIT (OUTPATIENT)
Facility: CLINIC | Age: 40
End: 2025-08-20
Payer: COMMERCIAL

## 2025-08-20 VITALS
HEIGHT: 63 IN | TEMPERATURE: 99 F | BODY MASS INDEX: 43.04 KG/M2 | HEART RATE: 67 BPM | WEIGHT: 242.9 LBS | RESPIRATION RATE: 17 BRPM | OXYGEN SATURATION: 97 % | SYSTOLIC BLOOD PRESSURE: 152 MMHG | DIASTOLIC BLOOD PRESSURE: 92 MMHG

## 2025-08-20 DIAGNOSIS — E13.9 DIABETES 1.5, MANAGED AS TYPE 1 (HCC): Primary | ICD-10-CM

## 2025-08-20 DIAGNOSIS — I10 PRIMARY HYPERTENSION: ICD-10-CM

## 2025-08-20 PROCEDURE — 99214 OFFICE O/P EST MOD 30 MIN: CPT | Performed by: STUDENT IN AN ORGANIZED HEALTH CARE EDUCATION/TRAINING PROGRAM

## 2025-08-20 PROCEDURE — 3046F HEMOGLOBIN A1C LEVEL >9.0%: CPT | Performed by: STUDENT IN AN ORGANIZED HEALTH CARE EDUCATION/TRAINING PROGRAM

## 2025-08-20 PROCEDURE — 3077F SYST BP >= 140 MM HG: CPT | Performed by: STUDENT IN AN ORGANIZED HEALTH CARE EDUCATION/TRAINING PROGRAM

## 2025-08-20 PROCEDURE — 3080F DIAST BP >= 90 MM HG: CPT | Performed by: STUDENT IN AN ORGANIZED HEALTH CARE EDUCATION/TRAINING PROGRAM

## 2025-08-21 LAB
ALBUMIN SERPL-MCNC: 3.8 G/DL (ref 3.5–5.2)
ALBUMIN/GLOB SERPL: 1.3 (ref 1.1–2.2)
ALP SERPL-CCNC: 100 U/L (ref 35–104)
ALT SERPL-CCNC: 58 U/L (ref 10–35)
ANION GAP SERPL CALC-SCNC: 13 MMOL/L (ref 2–14)
AST SERPL-CCNC: 41 U/L (ref 10–35)
BILIRUB SERPL-MCNC: 0.3 MG/DL (ref 0–1.2)
BUN SERPL-MCNC: 12 MG/DL (ref 6–20)
BUN/CREAT SERPL: 13 (ref 12–20)
CALCIUM SERPL-MCNC: 9.3 MG/DL (ref 8.6–10)
CHLORIDE SERPL-SCNC: 105 MMOL/L (ref 98–107)
CO2 SERPL-SCNC: 20 MMOL/L (ref 20–29)
CREAT SERPL-MCNC: 0.88 MG/DL (ref 0.6–1)
EST. AVERAGE GLUCOSE BLD GHB EST-MCNC: 133 MG/DL
GLOBULIN SER CALC-MCNC: 3 G/DL (ref 2–4)
GLUCOSE SERPL-MCNC: 139 MG/DL (ref 65–100)
HBA1C MFR BLD: 6.3 % (ref 4–5.6)
POTASSIUM SERPL-SCNC: 4.4 MMOL/L (ref 3.5–5.1)
PROT SERPL-MCNC: 6.8 G/DL (ref 6.4–8.3)
SODIUM SERPL-SCNC: 138 MMOL/L (ref 136–145)

## 2025-08-28 DIAGNOSIS — E13.9 DIABETES 1.5, MANAGED AS TYPE 1 (HCC): ICD-10-CM
